# Patient Record
Sex: MALE | Race: BLACK OR AFRICAN AMERICAN | NOT HISPANIC OR LATINO | Employment: OTHER | URBAN - METROPOLITAN AREA
[De-identification: names, ages, dates, MRNs, and addresses within clinical notes are randomized per-mention and may not be internally consistent; named-entity substitution may affect disease eponyms.]

---

## 2023-06-29 LAB
ALBUMIN (G/DL) IN SER/PLAS: 4.1 G/DL (ref 3.4–5)
ANION GAP IN SER/PLAS: 11 MMOL/L (ref 10–20)
CALCIDIOL (25 OH VITAMIN D3) (NG/ML) IN SER/PLAS: 60 NG/ML
CALCIUM (MG/DL) IN SER/PLAS: 10.2 MG/DL (ref 8.6–10.6)
CARBON DIOXIDE, TOTAL (MMOL/L) IN SER/PLAS: 34 MMOL/L (ref 21–32)
CHLORIDE (MMOL/L) IN SER/PLAS: 103 MMOL/L (ref 98–107)
CREATININE (MG/DL) IN SER/PLAS: 0.81 MG/DL (ref 0.5–1.3)
GFR MALE: >90 ML/MIN/1.73M2
GLUCOSE (MG/DL) IN SER/PLAS: 122 MG/DL (ref 74–99)
PHOSPHATE (MG/DL) IN SER/PLAS: 3.2 MG/DL (ref 2.5–4.9)
POTASSIUM (MMOL/L) IN SER/PLAS: 4 MMOL/L (ref 3.5–5.3)
SODIUM (MMOL/L) IN SER/PLAS: 144 MMOL/L (ref 136–145)
UREA NITROGEN (MG/DL) IN SER/PLAS: 11 MG/DL (ref 6–23)

## 2023-08-18 LAB — CREATININE (MG/DL) IN URINE: 53.9 MG/DL (ref 20–370)

## 2023-08-31 LAB
ALANINE AMINOTRANSFERASE (SGPT) (U/L) IN SER/PLAS: 27 U/L (ref 10–52)
ALBUMIN (G/DL) IN SER/PLAS: 3.9 G/DL (ref 3.4–5)
ALKALINE PHOSPHATASE (U/L) IN SER/PLAS: 163 U/L (ref 33–120)
ANION GAP IN SER/PLAS: 13 MMOL/L (ref 10–20)
ASPARTATE AMINOTRANSFERASE (SGOT) (U/L) IN SER/PLAS: 21 U/L (ref 9–39)
BASOPHILS (10*3/UL) IN BLOOD BY AUTOMATED COUNT: 0.02 X10E9/L (ref 0–0.1)
BASOPHILS/100 LEUKOCYTES IN BLOOD BY AUTOMATED COUNT: 0.4 % (ref 0–2)
BILIRUBIN TOTAL (MG/DL) IN SER/PLAS: 0.3 MG/DL (ref 0–1.2)
CALCIUM (MG/DL) IN SER/PLAS: 10.2 MG/DL (ref 8.6–10.6)
CARBON DIOXIDE, TOTAL (MMOL/L) IN SER/PLAS: 30 MMOL/L (ref 21–32)
CHLORIDE (MMOL/L) IN SER/PLAS: 103 MMOL/L (ref 98–107)
CREATININE (MG/DL) IN SER/PLAS: 0.73 MG/DL (ref 0.5–1.3)
EOSINOPHILS (10*3/UL) IN BLOOD BY AUTOMATED COUNT: 0.58 X10E9/L (ref 0–0.7)
EOSINOPHILS/100 LEUKOCYTES IN BLOOD BY AUTOMATED COUNT: 12.2 % (ref 0–6)
ERYTHROCYTE DISTRIBUTION WIDTH (RATIO) BY AUTOMATED COUNT: 14.7 % (ref 11.5–14.5)
ERYTHROCYTE MEAN CORPUSCULAR HEMOGLOBIN CONCENTRATION (G/DL) BY AUTOMATED: 31.6 G/DL (ref 32–36)
ERYTHROCYTE MEAN CORPUSCULAR VOLUME (FL) BY AUTOMATED COUNT: 85 FL (ref 80–100)
ERYTHROCYTES (10*6/UL) IN BLOOD BY AUTOMATED COUNT: 4.63 X10E12/L (ref 4.5–5.9)
GFR MALE: >90 ML/MIN/1.73M2
GLUCOSE (MG/DL) IN SER/PLAS: 82 MG/DL (ref 74–99)
HEMATOCRIT (%) IN BLOOD BY AUTOMATED COUNT: 39.2 % (ref 41–52)
HEMOGLOBIN (G/DL) IN BLOOD: 12.4 G/DL (ref 13.5–17.5)
IMMATURE GRANULOCYTES/100 LEUKOCYTES IN BLOOD BY AUTOMATED COUNT: 0.2 % (ref 0–0.9)
LEUKOCYTES (10*3/UL) IN BLOOD BY AUTOMATED COUNT: 4.8 X10E9/L (ref 4.4–11.3)
LYMPHOCYTES (10*3/UL) IN BLOOD BY AUTOMATED COUNT: 1.98 X10E9/L (ref 1.2–4.8)
LYMPHOCYTES/100 LEUKOCYTES IN BLOOD BY AUTOMATED COUNT: 41.7 % (ref 13–44)
MONOCYTES (10*3/UL) IN BLOOD BY AUTOMATED COUNT: 0.42 X10E9/L (ref 0.1–1)
MONOCYTES/100 LEUKOCYTES IN BLOOD BY AUTOMATED COUNT: 8.8 % (ref 2–10)
NEUTROPHILS (10*3/UL) IN BLOOD BY AUTOMATED COUNT: 1.74 X10E9/L (ref 1.2–7.7)
NEUTROPHILS/100 LEUKOCYTES IN BLOOD BY AUTOMATED COUNT: 36.7 % (ref 40–80)
NRBC (PER 100 WBCS) BY AUTOMATED COUNT: 0 /100 WBC (ref 0–0)
PLATELETS (10*3/UL) IN BLOOD AUTOMATED COUNT: 423 X10E9/L (ref 150–450)
POTASSIUM (MMOL/L) IN SER/PLAS: 4.2 MMOL/L (ref 3.5–5.3)
PROTEIN TOTAL: 7.1 G/DL (ref 6.4–8.2)
SODIUM (MMOL/L) IN SER/PLAS: 142 MMOL/L (ref 136–145)
UREA NITROGEN (MG/DL) IN SER/PLAS: 12 MG/DL (ref 6–23)

## 2023-09-02 LAB
NIL(NEG) CONTROL SPOT COUNT: NORMAL
PANEL A SPOT COUNT: 1
PANEL B SPOT COUNT: 1
POS CONTROL SPOT COUNT: NORMAL
T-SPOT. TB INTERPRETATION: NEGATIVE

## 2023-09-26 LAB
ALANINE AMINOTRANSFERASE (SGPT) (U/L) IN SER/PLAS: 23 U/L (ref 10–52)
ALBUMIN (G/DL) IN SER/PLAS: 4.3 G/DL (ref 3.4–5)
ALKALINE PHOSPHATASE (U/L) IN SER/PLAS: 160 U/L (ref 33–120)
ANION GAP IN SER/PLAS: 10 MMOL/L (ref 10–20)
ASPARTATE AMINOTRANSFERASE (SGOT) (U/L) IN SER/PLAS: 21 U/L (ref 9–39)
BASOPHILS (10*3/UL) IN BLOOD BY AUTOMATED COUNT: 0.03 X10E9/L (ref 0–0.1)
BASOPHILS/100 LEUKOCYTES IN BLOOD BY AUTOMATED COUNT: 0.6 % (ref 0–2)
BILIRUBIN TOTAL (MG/DL) IN SER/PLAS: 0.4 MG/DL (ref 0–1.2)
CALCIDIOL (25 OH VITAMIN D3) (NG/ML) IN SER/PLAS: 54 NG/ML
CALCIUM (MG/DL) IN SER/PLAS: 10.5 MG/DL (ref 8.6–10.6)
CARBON DIOXIDE, TOTAL (MMOL/L) IN SER/PLAS: 32 MMOL/L (ref 21–32)
CHLORIDE (MMOL/L) IN SER/PLAS: 103 MMOL/L (ref 98–107)
CHOLESTEROL (MG/DL) IN SER/PLAS: 169 MG/DL (ref 0–199)
CHOLESTEROL IN HDL (MG/DL) IN SER/PLAS: 52.7 MG/DL
CHOLESTEROL/HDL RATIO: 3.2
CREATININE (MG/DL) IN SER/PLAS: 0.83 MG/DL (ref 0.5–1.3)
EOSINOPHILS (10*3/UL) IN BLOOD BY AUTOMATED COUNT: 0.44 X10E9/L (ref 0–0.7)
EOSINOPHILS/100 LEUKOCYTES IN BLOOD BY AUTOMATED COUNT: 8.9 % (ref 0–6)
ERYTHROCYTE DISTRIBUTION WIDTH (RATIO) BY AUTOMATED COUNT: 15.2 % (ref 11.5–14.5)
ERYTHROCYTE MEAN CORPUSCULAR HEMOGLOBIN CONCENTRATION (G/DL) BY AUTOMATED: 30.7 G/DL (ref 32–36)
ERYTHROCYTE MEAN CORPUSCULAR VOLUME (FL) BY AUTOMATED COUNT: 87 FL (ref 80–100)
ERYTHROCYTES (10*6/UL) IN BLOOD BY AUTOMATED COUNT: 5.09 X10E12/L (ref 4.5–5.9)
ESTIMATED AVERAGE GLUCOSE FOR HBA1C: 103 MG/DL
GFR MALE: >90 ML/MIN/1.73M2
GLUCOSE (MG/DL) IN SER/PLAS: 127 MG/DL (ref 74–99)
HEMATOCRIT (%) IN BLOOD BY AUTOMATED COUNT: 44.3 % (ref 41–52)
HEMOGLOBIN (G/DL) IN BLOOD: 13.6 G/DL (ref 13.5–17.5)
HEMOGLOBIN A1C/HEMOGLOBIN TOTAL IN BLOOD: 5.2 %
IMMATURE GRANULOCYTES/100 LEUKOCYTES IN BLOOD BY AUTOMATED COUNT: 0.2 % (ref 0–0.9)
KEPPRA: 18 UG/ML (ref 10–40)
LDL: 91 MG/DL (ref 0–99)
LEUKOCYTES (10*3/UL) IN BLOOD BY AUTOMATED COUNT: 5 X10E9/L (ref 4.4–11.3)
LYMPHOCYTES (10*3/UL) IN BLOOD BY AUTOMATED COUNT: 1.91 X10E9/L (ref 1.2–4.8)
LYMPHOCYTES/100 LEUKOCYTES IN BLOOD BY AUTOMATED COUNT: 38.6 % (ref 13–44)
MONOCYTES (10*3/UL) IN BLOOD BY AUTOMATED COUNT: 0.32 X10E9/L (ref 0.1–1)
MONOCYTES/100 LEUKOCYTES IN BLOOD BY AUTOMATED COUNT: 6.5 % (ref 2–10)
NEUTROPHILS (10*3/UL) IN BLOOD BY AUTOMATED COUNT: 2.24 X10E9/L (ref 1.2–7.7)
NEUTROPHILS/100 LEUKOCYTES IN BLOOD BY AUTOMATED COUNT: 45.2 % (ref 40–80)
NRBC (PER 100 WBCS) BY AUTOMATED COUNT: 0 /100 WBC (ref 0–0)
PLATELETS (10*3/UL) IN BLOOD AUTOMATED COUNT: 355 X10E9/L (ref 150–450)
POTASSIUM (MMOL/L) IN SER/PLAS: 3.8 MMOL/L (ref 3.5–5.3)
PROTEIN TOTAL: 7.7 G/DL (ref 6.4–8.2)
SODIUM (MMOL/L) IN SER/PLAS: 141 MMOL/L (ref 136–145)
THYROTROPIN (MIU/L) IN SER/PLAS BY DETECTION LIMIT <= 0.05 MIU/L: 2.24 MIU/L (ref 0.44–3.98)
THYROXINE (T4) FREE (NG/DL) IN SER/PLAS: 0.93 NG/DL (ref 0.78–1.48)
TRIGLYCERIDE (MG/DL) IN SER/PLAS: 127 MG/DL (ref 0–149)
URATE (MG/DL) IN SER/PLAS: 5.2 MG/DL (ref 4–7.5)
UREA NITROGEN (MG/DL) IN SER/PLAS: 12 MG/DL (ref 6–23)
VLDL: 25 MG/DL (ref 0–40)

## 2023-09-29 LAB — OXCARB OR ESLICARB METABOLITE (MHD): 19 UG/ML (ref 3–35)

## 2023-10-14 ENCOUNTER — HOSPITAL ENCOUNTER (EMERGENCY)
Facility: HOSPITAL | Age: 58
Discharge: HOME | End: 2023-10-14
Attending: EMERGENCY MEDICINE
Payer: MEDICAID

## 2023-10-14 ENCOUNTER — APPOINTMENT (OUTPATIENT)
Dept: RADIOLOGY | Facility: HOSPITAL | Age: 58
End: 2023-10-14
Payer: MEDICAID

## 2023-10-14 VITALS
TEMPERATURE: 98.4 F | SYSTOLIC BLOOD PRESSURE: 151 MMHG | RESPIRATION RATE: 16 BRPM | DIASTOLIC BLOOD PRESSURE: 101 MMHG | OXYGEN SATURATION: 95 % | HEART RATE: 89 BPM

## 2023-10-14 DIAGNOSIS — J06.9 UPPER RESPIRATORY TRACT INFECTION, UNSPECIFIED TYPE: Primary | ICD-10-CM

## 2023-10-14 LAB
ALBUMIN SERPL BCP-MCNC: 4.1 G/DL (ref 3.4–5)
ALP SERPL-CCNC: 194 U/L (ref 33–120)
ALT SERPL W P-5'-P-CCNC: 39 U/L (ref 10–52)
ANION GAP SERPL CALC-SCNC: 13 MMOL/L (ref 10–20)
AST SERPL W P-5'-P-CCNC: 55 U/L (ref 9–39)
BASOPHILS # BLD AUTO: 0.02 X10*3/UL (ref 0–0.1)
BASOPHILS NFR BLD AUTO: 0.2 %
BILIRUB SERPL-MCNC: 0.4 MG/DL (ref 0–1.2)
BNP SERPL-MCNC: 22 PG/ML (ref 0–99)
BUN SERPL-MCNC: 7 MG/DL (ref 6–23)
CALCIUM SERPL-MCNC: 9.6 MG/DL (ref 8.6–10.3)
CARDIAC TROPONIN I PNL SERPL HS: 8 NG/L (ref 0–20)
CARDIAC TROPONIN I PNL SERPL HS: 9 NG/L (ref 0–20)
CHLORIDE SERPL-SCNC: 98 MMOL/L (ref 98–107)
CO2 SERPL-SCNC: 28 MMOL/L (ref 21–32)
CREAT SERPL-MCNC: 0.62 MG/DL (ref 0.5–1.3)
D DIMER PPP FEU-MCNC: 725 NG/ML FEU
EOSINOPHIL # BLD AUTO: 0.62 X10*3/UL (ref 0–0.7)
EOSINOPHIL NFR BLD AUTO: 4.8 %
ERYTHROCYTE [DISTWIDTH] IN BLOOD BY AUTOMATED COUNT: 14.6 % (ref 11.5–14.5)
GFR SERPL CREATININE-BSD FRML MDRD: >90 ML/MIN/1.73M*2
GLUCOSE SERPL-MCNC: 101 MG/DL (ref 74–99)
HCT VFR BLD AUTO: 41.9 % (ref 41–52)
HGB BLD-MCNC: 13.6 G/DL (ref 13.5–17.5)
IMM GRANULOCYTES # BLD AUTO: 0.05 X10*3/UL (ref 0–0.7)
IMM GRANULOCYTES NFR BLD AUTO: 0.4 % (ref 0–0.9)
INR PPP: 1.1 (ref 0.9–1.1)
LYMPHOCYTES # BLD AUTO: 1.47 X10*3/UL (ref 1.2–4.8)
LYMPHOCYTES NFR BLD AUTO: 11.4 %
MAGNESIUM SERPL-MCNC: 2.03 MG/DL (ref 1.6–2.4)
MCH RBC QN AUTO: 26.8 PG (ref 26–34)
MCHC RBC AUTO-ENTMCNC: 32.5 G/DL (ref 32–36)
MCV RBC AUTO: 83 FL (ref 80–100)
MONOCYTES # BLD AUTO: 1.04 X10*3/UL (ref 0.1–1)
MONOCYTES NFR BLD AUTO: 8.1 %
NEUTROPHILS # BLD AUTO: 9.67 X10*3/UL (ref 1.2–7.7)
NEUTROPHILS NFR BLD AUTO: 75.1 %
NRBC BLD-RTO: 0 /100 WBCS (ref 0–0)
PLATELET # BLD AUTO: 325 X10*3/UL (ref 150–450)
PMV BLD AUTO: 10 FL (ref 7.5–11.5)
POTASSIUM SERPL-SCNC: 3.7 MMOL/L (ref 3.5–5.3)
PROT SERPL-MCNC: 8 G/DL (ref 6.4–8.2)
PROTHROMBIN TIME: 12.1 SECONDS (ref 9.8–12.8)
RBC # BLD AUTO: 5.08 X10*6/UL (ref 4.5–5.9)
SODIUM SERPL-SCNC: 135 MMOL/L (ref 136–145)
WBC # BLD AUTO: 12.9 X10*3/UL (ref 4.4–11.3)

## 2023-10-14 PROCEDURE — 71275 CT ANGIOGRAPHY CHEST: CPT | Performed by: RADIOLOGY

## 2023-10-14 PROCEDURE — 83735 ASSAY OF MAGNESIUM: CPT

## 2023-10-14 PROCEDURE — 2580000001 HC RX 258 IV SOLUTIONS

## 2023-10-14 PROCEDURE — 99285 EMERGENCY DEPT VISIT HI MDM: CPT | Performed by: EMERGENCY MEDICINE

## 2023-10-14 PROCEDURE — 85379 FIBRIN DEGRADATION QUANT: CPT

## 2023-10-14 PROCEDURE — 2500000004 HC RX 250 GENERAL PHARMACY W/ HCPCS (ALT 636 FOR OP/ED)

## 2023-10-14 PROCEDURE — 84484 ASSAY OF TROPONIN QUANT: CPT

## 2023-10-14 PROCEDURE — 71045 X-RAY EXAM CHEST 1 VIEW: CPT | Mod: FY

## 2023-10-14 PROCEDURE — 36415 COLL VENOUS BLD VENIPUNCTURE: CPT

## 2023-10-14 PROCEDURE — 71045 X-RAY EXAM CHEST 1 VIEW: CPT | Performed by: RADIOLOGY

## 2023-10-14 PROCEDURE — 2550000001 HC RX 255 CONTRASTS

## 2023-10-14 PROCEDURE — 93010 ELECTROCARDIOGRAM REPORT: CPT | Performed by: EMERGENCY MEDICINE

## 2023-10-14 PROCEDURE — 84484 ASSAY OF TROPONIN QUANT: CPT | Mod: 59

## 2023-10-14 PROCEDURE — 85025 COMPLETE CBC W/AUTO DIFF WBC: CPT

## 2023-10-14 PROCEDURE — 99284 EMERGENCY DEPT VISIT MOD MDM: CPT | Mod: 25 | Performed by: EMERGENCY MEDICINE

## 2023-10-14 PROCEDURE — 83880 ASSAY OF NATRIURETIC PEPTIDE: CPT

## 2023-10-14 PROCEDURE — 85610 PROTHROMBIN TIME: CPT

## 2023-10-14 PROCEDURE — 71275 CT ANGIOGRAPHY CHEST: CPT

## 2023-10-14 PROCEDURE — 2500000001 HC RX 250 WO HCPCS SELF ADMINISTERED DRUGS (ALT 637 FOR MEDICARE OP)

## 2023-10-14 PROCEDURE — 80053 COMPREHEN METABOLIC PANEL: CPT

## 2023-10-14 RX ORDER — VERAPAMIL HYDROCHLORIDE 120 MG/1
240 TABLET, FILM COATED ORAL ONCE
Status: COMPLETED | OUTPATIENT
Start: 2023-10-14 | End: 2023-10-14

## 2023-10-14 RX ORDER — LISINOPRIL 10 MG/1
20 TABLET ORAL ONCE
Status: COMPLETED | OUTPATIENT
Start: 2023-10-14 | End: 2023-10-14

## 2023-10-14 RX ORDER — LEVETIRACETAM 500 MG/1
500 TABLET ORAL ONCE
Status: COMPLETED | OUTPATIENT
Start: 2023-10-14 | End: 2023-10-14

## 2023-10-14 RX ADMIN — SODIUM CHLORIDE, POTASSIUM CHLORIDE, SODIUM LACTATE AND CALCIUM CHLORIDE 1000 ML: 600; 310; 30; 20 INJECTION, SOLUTION INTRAVENOUS at 17:22

## 2023-10-14 RX ADMIN — VERAPAMIL HYDROCHLORIDE 240 MG: 120 TABLET ORAL at 19:55

## 2023-10-14 RX ADMIN — LEVETIRACETAM 500 MG: 500 TABLET, FILM COATED ORAL at 19:55

## 2023-10-14 RX ADMIN — LISINOPRIL 20 MG: 10 TABLET ORAL at 19:54

## 2023-10-14 RX ADMIN — IOHEXOL 60 ML: 350 INJECTION, SOLUTION INTRAVENOUS at 18:46

## 2023-10-14 ASSESSMENT — LIFESTYLE VARIABLES
HAVE PEOPLE ANNOYED YOU BY CRITICIZING YOUR DRINKING: NO
EVER HAD A DRINK FIRST THING IN THE MORNING TO STEADY YOUR NERVES TO GET RID OF A HANGOVER: NO
REASON UNABLE TO ASSESS: YES
HAVE YOU EVER FELT YOU SHOULD CUT DOWN ON YOUR DRINKING: NO
EVER FELT BAD OR GUILTY ABOUT YOUR DRINKING: NO

## 2023-10-14 ASSESSMENT — COLUMBIA-SUICIDE SEVERITY RATING SCALE - C-SSRS
6. HAVE YOU EVER DONE ANYTHING, STARTED TO DO ANYTHING, OR PREPARED TO DO ANYTHING TO END YOUR LIFE?: NO
2. HAVE YOU ACTUALLY HAD ANY THOUGHTS OF KILLING YOURSELF?: NO
1. IN THE PAST MONTH, HAVE YOU WISHED YOU WERE DEAD OR WISHED YOU COULD GO TO SLEEP AND NOT WAKE UP?: NO

## 2023-10-14 NOTE — ED PROVIDER NOTES
HPI   Chief Complaint   Patient presents with    low SPO2 per staff     Staff reported 88% spo2, jasmin states pt 95% for them       Patient is a 58-year-old male cerebral palsy, nonverbal, hypertension, epilepsy, hyperlipidemia who presents emergency department for decreased oxygen saturation.  Patient resides in a group home.  He has been having coughing and nasal congestion over the last several days.  He took his pulse ox and reported that it was 88% on room air.  They called EMS.  EMS took his vital signs and he was at 95%.  Patient was brought to the emerged part for evaluation.  Patient is nonverbal and and review of systems is unable to obtain, but his caregiver is here with him and the above history is obtained.      History provided by:  Caregiver and EMS personnel                      No data recorded                Patient History   Past Medical History:   Diagnosis Date    Epilepsy, unspecified, not intractable, without status epilepticus (CMS/[facility])     Epilepsy    Hyperlipidemia, unspecified     Dyslipidemia    Personal history of other diseases of the circulatory system     History of hypertension    Personal history of other diseases of the nervous system and sense organs     History of cerebral palsy    Personal history of other endocrine, nutritional and metabolic disease     History of vitamin D deficiency    Personal history of other specified conditions     History of dysphagia     Past Surgical History:   Procedure Laterality Date    OTHER SURGICAL HISTORY  11/26/2019    No history of surgery     No family history on file.  Social History     Tobacco Use    Smoking status: Not on file    Smokeless tobacco: Not on file   Substance Use Topics    Alcohol use: Not on file    Drug use: Not on file       Physical Exam   ED Triage Vitals [10/14/23 1456]   Temp Heart Rate Resp BP   36.9 °C (98.4 °F) 104 18 (!) 199/97      SpO2 Temp src Heart Rate Source Patient Position   95 % -- Monitor --      BP  Location FiO2 (%)     -- --       Physical Exam  Vitals and nursing note reviewed.   Constitutional:       General: He is not in acute distress.     Appearance: He is well-developed. He is not ill-appearing or toxic-appearing.      Comments: Awake and alert, nonverbal.   HENT:      Head: Normocephalic and atraumatic.      Right Ear: External ear normal.      Left Ear: External ear normal.      Nose: Nose normal.      Mouth/Throat:      Mouth: Mucous membranes are moist.   Eyes:      General: No scleral icterus.     Conjunctiva/sclera: Conjunctivae normal.      Pupils: Pupils are equal, round, and reactive to light.   Cardiovascular:      Rate and Rhythm: Normal rate and regular rhythm.      Heart sounds: No murmur heard.  Pulmonary:      Effort: Pulmonary effort is normal. No respiratory distress.      Breath sounds: Normal breath sounds.   Abdominal:      Palpations: Abdomen is soft.      Tenderness: There is no abdominal tenderness.   Musculoskeletal:         General: No swelling or tenderness.      Cervical back: Neck supple. No rigidity.      Comments: Bilateral upper extremities are contracted in a flexed position over his chest.   Skin:     General: Skin is warm and dry.   Neurological:      General: No focal deficit present.      Mental Status: He is alert. Mental status is at baseline.   Psychiatric:         Mood and Affect: Mood normal.         ED Course & MDM   Diagnoses as of 10/14/23 1936   Upper respiratory tract infection, unspecified type       Medical Decision Making  Patient is a 58-year-old male with cerebral palsy, nonverbal who presents to the emergency department for hypoxia at his nursing facility.  Patient did not have any hypoxia at the nursing facility or here.  He appears comfortable without any distress on arrival.  Afebrile, hemodynamically stable.  We will obtain lab work and chest x-ray to evaluate for any potential hypoxia such as cardiac ischemia, pneumonia, pneumothorax, pleural  effusions, heart failure.  He is no be tachycardic here.  D-dimer will be obtained to rule out pulmonary embolism.    EKG at [] as interpreted by myself and attending physician: Ventricular rate []    Results for orders placed or performed during the hospital encounter of 10/14/23  -CBC and Auto Differential:        Result                      Value             Ref Range           WBC                         12.9 (H)          4.4 - 11.3 x*       nRBC                        0.0               0.0 - 0.0 /1*       RBC                         5.08              4.50 - 5.90 *       Hemoglobin                  13.6              13.5 - 17.5 *       Hematocrit                  41.9              41.0 - 52.0 %       MCV                         83                80 - 100 fL         MCH                         26.8              26.0 - 34.0 *       MCHC                        32.5              32.0 - 36.0 *       RDW                         14.6 (H)          11.5 - 14.5 %       Platelets                   325               150 - 450 x1*       MPV                         10.0              7.5 - 11.5 fL       Neutrophils %               75.1              40.0 - 80.0 %       Immature Granulocytes *     0.4               0.0 - 0.9 %         Lymphocytes %               11.4              13.0 - 44.0 %       Monocytes %                 8.1               2.0 - 10.0 %        Eosinophils %               4.8               0.0 - 6.0 %         Basophils %                 0.2               0.0 - 2.0 %         Neutrophils Absolute        9.67 (H)          1.20 - 7.70 *       Immature Granulocytes *     0.05              0.00 - 0.70 *       Lymphocytes Absolute        1.47              1.20 - 4.80 *       Monocytes Absolute          1.04 (H)          0.10 - 1.00 *       Eosinophils Absolute        0.62              0.00 - 0.70 *       Basophils Absolute          0.02              0.00 - 0.10 *  -Comprehensive Metabolic Panel:        Result                       Value             Ref Range           Glucose                     101 (H)           74 - 99 mg/dL       Sodium                      135 (L)           136 - 145 mm*       Potassium                   3.7               3.5 - 5.3 mm*       Chloride                    98                98 - 107 mmo*       Bicarbonate                 28                21 - 32 mmol*       Anion Gap                   13                10 - 20 mmol*       Urea Nitrogen               7                 6 - 23 mg/dL        Creatinine                  0.62              0.50 - 1.30 *       eGFR                        >90               >60 mL/min/1*       Calcium                     9.6               8.6 - 10.3 m*       Albumin                     4.1               3.4 - 5.0 g/*       Alkaline Phosphatase        194 (H)           33 - 120 U/L        Total Protein               8.0               6.4 - 8.2 g/*       AST                         55 (H)            9 - 39 U/L          Bilirubin, Total            0.4               0.0 - 1.2 mg*       ALT                         39                10 - 52 U/L    -Magnesium:        Result                      Value             Ref Range           Magnesium                   2.03              1.60 - 2.40 *  -Protime-INR:        Result                      Value             Ref Range           Protime                     12.1              9.8 - 12.8 s*       INR                         1.1               0.9 - 1.1      -B-type natriuretic peptide:        Result                      Value             Ref Range           BNP                         22                0 - 99 pg/mL   -Troponin I, High Sensitivity, Initial:        Result                      Value             Ref Range           Troponin I, High Sensi*     8                 0 - 20 ng/L    -Troponin, High Sensitivity, 1 Hour:        Result                      Value             Ref Range           Troponin I, High Sensi*     9                  0 - 20 ng/L    -D-dimer, quantitative:        Result                      Value             Ref Range           D-Dimer Non VTE, Quant*     725 (H)           <=500 ng/mL *     XR chest 1 view   Final Result    Limited exam shows no acute cardiopulmonary process          MACRO:    None          Signed by: Rios Trujillo 10/14/2023 4:36 PM    Dictation workstation:   [id number]       D-dimer is elevated at 725.  We will obtain CT angio of the chest to rule out pulmonary embolism.    CT angio chest for pulmonary embolism   Final Result    No acute pulmonary embolism to the segmental level within constraints    of artifact.          No focal consolidation or sizable pleural effusion.          Partially imaged significant stool burden.                MACRO:    None.          Signed by: Loree Dobbs 10/14/2023 7:14 PM    Dictation workstation:   [id number]     CT angio of the chest is negative for pulm embolism or any other acute radiographic findings.  Findings were discussed with the patient's caregiver.  Patient likely has a viral URI.  There is significant stool noted on the CT chest incidentally.  On chart review, he has as needed MiraLAX.  His discharge paperwork as well as verbal instructions are to start using the MiraLAX in addition to his lactulose.  Home care and return instructions discussed.  Patient's caregiver expressed understanding and agreement. Patient discharged in stable condition.    Tommy Nguyễn DO, PGY-3  Emergency Medicine Resident    Amount and/or Complexity of Data Reviewed  Independent Historian: caregiver and EMS  Labs: ordered.  Radiology: ordered and independent interpretation performed.  ECG/medicine tests: ordered and independent interpretation performed.        Procedure  Procedures     Tommy Nguyễn DO  Resident  10/14/23 7898

## 2023-10-17 ENCOUNTER — SPECIALTY PHARMACY (OUTPATIENT)
Dept: PHARMACY | Facility: CLINIC | Age: 58
End: 2023-10-17

## 2023-10-17 ENCOUNTER — PHARMACY VISIT (OUTPATIENT)
Dept: PHARMACY | Facility: CLINIC | Age: 58
End: 2023-10-17
Payer: MEDICAID

## 2023-10-17 ENCOUNTER — HOSPITAL ENCOUNTER (OUTPATIENT)
Dept: CARDIOLOGY | Facility: HOSPITAL | Age: 58
Discharge: HOME | End: 2023-10-17
Payer: MEDICAID

## 2023-10-17 LAB
ATRIAL RATE: 101 BPM
P AXIS: 61 DEGREES
P OFFSET: 185 MS
P ONSET: 139 MS
PR INTERVAL: 166 MS
Q ONSET: 222 MS
QRS COUNT: 17 BEATS
QRS DURATION: 60 MS
QT INTERVAL: 312 MS
QTC CALCULATION(BAZETT): 404 MS
QTC FREDERICIA: 371 MS
R AXIS: 41 DEGREES
T AXIS: 9 DEGREES
T OFFSET: 378 MS
VENTRICULAR RATE: 101 BPM

## 2023-10-17 PROCEDURE — 93005 ELECTROCARDIOGRAM TRACING: CPT

## 2023-10-17 PROCEDURE — RXMED WILLOW AMBULATORY MEDICATION CHARGE

## 2023-11-11 ENCOUNTER — PHARMACY VISIT (OUTPATIENT)
Dept: PHARMACY | Facility: CLINIC | Age: 58
End: 2023-11-11
Payer: MEDICAID

## 2023-11-11 PROCEDURE — RXMED WILLOW AMBULATORY MEDICATION CHARGE

## 2023-11-15 ENCOUNTER — SPECIALTY PHARMACY (OUTPATIENT)
Dept: PHARMACY | Facility: CLINIC | Age: 58
End: 2023-11-15

## 2023-12-15 ENCOUNTER — SPECIALTY PHARMACY (OUTPATIENT)
Dept: PHARMACY | Facility: CLINIC | Age: 58
End: 2023-12-15

## 2023-12-16 ENCOUNTER — SPECIALTY PHARMACY (OUTPATIENT)
Dept: PHARMACY | Facility: CLINIC | Age: 58
End: 2023-12-16

## 2023-12-16 PROCEDURE — RXMED WILLOW AMBULATORY MEDICATION CHARGE

## 2023-12-18 ENCOUNTER — PHARMACY VISIT (OUTPATIENT)
Dept: PHARMACY | Facility: CLINIC | Age: 58
End: 2023-12-18
Payer: MEDICAID

## 2024-01-12 PROCEDURE — RXMED WILLOW AMBULATORY MEDICATION CHARGE

## 2024-01-23 ENCOUNTER — SPECIALTY PHARMACY (OUTPATIENT)
Dept: PHARMACY | Facility: CLINIC | Age: 59
End: 2024-01-23

## 2024-01-23 ENCOUNTER — PHARMACY VISIT (OUTPATIENT)
Dept: PHARMACY | Facility: CLINIC | Age: 59
End: 2024-01-23
Payer: MEDICAID

## 2024-02-17 PROCEDURE — RXMED WILLOW AMBULATORY MEDICATION CHARGE

## 2024-02-19 ENCOUNTER — PHARMACY VISIT (OUTPATIENT)
Dept: PHARMACY | Facility: CLINIC | Age: 59
End: 2024-02-19
Payer: MEDICAID

## 2024-02-19 ENCOUNTER — SPECIALTY PHARMACY (OUTPATIENT)
Dept: PHARMACY | Facility: CLINIC | Age: 59
End: 2024-02-19

## 2024-02-23 ENCOUNTER — OFFICE VISIT (OUTPATIENT)
Dept: DERMATOLOGY | Facility: CLINIC | Age: 59
End: 2024-02-23
Payer: MEDICAID

## 2024-02-23 DIAGNOSIS — L20.89 OTHER ATOPIC DERMATITIS: Primary | ICD-10-CM

## 2024-02-23 PROCEDURE — 99213 OFFICE O/P EST LOW 20 MIN: CPT | Performed by: DERMATOLOGY

## 2024-02-23 RX ORDER — NAPROXEN SODIUM 220 MG/1
81 TABLET, FILM COATED ORAL
COMMUNITY

## 2024-02-23 RX ORDER — POTASSIUM CHLORIDE 750 MG/1
CAPSULE, EXTENDED RELEASE ORAL
COMMUNITY

## 2024-02-23 RX ORDER — CHOLECALCIFEROL (VITAMIN D3) 25 MCG
1 TABLET ORAL DAILY
COMMUNITY

## 2024-02-23 RX ORDER — OXCARBAZEPINE 300 MG/1
1 TABLET, FILM COATED ORAL 2 TIMES DAILY
COMMUNITY
Start: 2016-05-31

## 2024-02-23 RX ORDER — DOCUSATE SODIUM 50MG AND SENNOSIDES 8.6MG 8.6; 5 MG/1; MG/1
TABLET, FILM COATED ORAL
COMMUNITY
Start: 2024-02-14

## 2024-02-23 RX ORDER — LISINOPRIL 20 MG/1
20 TABLET ORAL
COMMUNITY
Start: 2022-08-15

## 2024-02-23 RX ORDER — LABETALOL 100 MG/1
TABLET, FILM COATED ORAL
COMMUNITY
Start: 2024-02-14

## 2024-02-23 RX ORDER — MULTIPLE VITAMINS W/ MINERALS TAB 9MG-400MCG
TAB ORAL
COMMUNITY
Start: 2024-01-15

## 2024-02-23 RX ORDER — POLYETHYLENE GLYCOL 3350 17 G/17G
17 POWDER, FOR SOLUTION ORAL
COMMUNITY
Start: 2015-06-08

## 2024-02-23 RX ORDER — LEVETIRACETAM 500 MG/1
500 TABLET ORAL 2 TIMES DAILY
COMMUNITY
Start: 2016-05-31

## 2024-02-23 RX ORDER — CLONIDINE HYDROCHLORIDE 0.2 MG/1
TABLET ORAL
COMMUNITY
Start: 2024-02-14

## 2024-02-23 RX ORDER — OXCARBAZEPINE 150 MG/1
1 TABLET, FILM COATED ORAL 2 TIMES DAILY
COMMUNITY
Start: 2016-05-31

## 2024-02-23 RX ORDER — CHLORHEXIDINE GLUCONATE ORAL RINSE 1.2 MG/ML
15 SOLUTION DENTAL 2 TIMES DAILY
COMMUNITY
Start: 2015-07-30

## 2024-02-23 RX ORDER — HYDROCHLOROTHIAZIDE 12.5 MG/1
TABLET ORAL
COMMUNITY
Start: 2024-02-14

## 2024-02-23 RX ORDER — AMLODIPINE BESYLATE 10 MG/1
10 TABLET ORAL 2 TIMES DAILY
COMMUNITY

## 2024-02-23 NOTE — PROGRESS NOTES
Subjective     Maximo Pablo is a 58 y.o. male who presents for the following: Eczema (Pt presents for 6 month follow up of Eczema and Dupixent. No complaints at this time. ).     Review of Systems:  No other skin or systemic complaints other than what is documented elsewhere in the note.    The following portions of the chart were reviewed this encounter and updated as appropriate:          Skin Cancer History  No skin cancer on file.      Specialty Problems    None       Objective   Well appearing patient in no apparent distress; mood and affect are within normal limits.    A focused skin examination was performed of the neck, face, hands. Limited due to patient's contractures and wheel chair. All findings within normal limits unless otherwise noted below.    Assessment/Plan   1. Other atopic dermatitis  Few excoriated papules on the upper back, neck  BSA < 3%  SCORAD approximately 10    The chronic and intermittently flaring nature of this skin condition was discussed with the patient/cargiver today.     Patient previously on topical triamcinolone ointment with improvement, however continued to flare a few months later. He has been on Dupixent for over 1 year and is doing extremely well on treatment.    Caregiver does not report side effects on treatment.    Last labs: 8/2023 - CBC and CMP and Tspot WNL;   10/14/23 - CBC with diff and CMP - solitary liver enzyme and alk phos slightly elevated, WBC slightly elevated, otherwise stable    Continue Dupixent 300mg every other week subcutaneously  Continue triamcinolone 0.1% topical ointment as needed for flaring. apply 2x daily x 2 wks then decrease to 2x/day 2 days per week. Can repeat full 2 week course as often as every 6-8 weeks as needed for flaring.     Dupixent is a medication indicated for moderate to severe atopic dermatitis that does not respond to routine topical therapy. Side effects of medication including allergic reactions (hives, swelling, itching,  rash), injection site reaction (swelling at site where medication injected with pen or syringe), increased risk of infection (specifically parasitic), cold sores, joint pain, sore throat, dry, red eye (pink eye like symptoms).      Side effects of topical steroids includes, but is not limited to skin atrophy and dyspigmentation.

## 2024-03-04 ENCOUNTER — SPECIALTY PHARMACY (OUTPATIENT)
Dept: PHARMACY | Facility: CLINIC | Age: 59
End: 2024-03-04

## 2024-03-20 ENCOUNTER — SPECIALTY PHARMACY (OUTPATIENT)
Dept: PHARMACY | Facility: CLINIC | Age: 59
End: 2024-03-20

## 2024-03-20 ENCOUNTER — TELEPHONE (OUTPATIENT)
Dept: DERMATOLOGY | Facility: CLINIC | Age: 59
End: 2024-03-20
Payer: MEDICAID

## 2024-03-20 DIAGNOSIS — L20.89 OTHER ATOPIC DERMATITIS: Primary | ICD-10-CM

## 2024-03-20 NOTE — TELEPHONE ENCOUNTER
Pts caregiver from group home called r/t Dupixent refills. Pt is in need of refill dure for injection this week.

## 2024-03-21 ENCOUNTER — PHARMACY VISIT (OUTPATIENT)
Dept: PHARMACY | Facility: CLINIC | Age: 59
End: 2024-03-21
Payer: MEDICAID

## 2024-03-21 PROCEDURE — RXMED WILLOW AMBULATORY MEDICATION CHARGE

## 2024-03-29 ENCOUNTER — LAB REQUISITION (OUTPATIENT)
Dept: LAB | Facility: HOSPITAL | Age: 59
End: 2024-03-29
Payer: MEDICAID

## 2024-03-29 DIAGNOSIS — E87.0 HYPEROSMOLALITY AND HYPERNATREMIA: ICD-10-CM

## 2024-03-29 DIAGNOSIS — N18.2 CHRONIC KIDNEY DISEASE, STAGE 2 (MILD): ICD-10-CM

## 2024-03-29 DIAGNOSIS — G40.509 EPILEPTIC SEIZURES RELATED TO EXTERNAL CAUSES, NOT INTRACTABLE, WITHOUT STATUS EPILEPTICUS (MULTI): ICD-10-CM

## 2024-03-29 DIAGNOSIS — I10 ESSENTIAL (PRIMARY) HYPERTENSION: ICD-10-CM

## 2024-03-29 LAB
ANION GAP SERPL CALC-SCNC: 13 MMOL/L (ref 10–20)
BUN SERPL-MCNC: 8 MG/DL (ref 6–23)
CALCIUM SERPL-MCNC: 9.9 MG/DL (ref 8.6–10.3)
CHLORIDE SERPL-SCNC: 100 MMOL/L (ref 98–107)
CO2 SERPL-SCNC: 30 MMOL/L (ref 21–32)
CREAT SERPL-MCNC: 0.74 MG/DL (ref 0.5–1.3)
CREAT UR-MCNC: 71.2 MG/DL (ref 20–370)
EGFRCR SERPLBLD CKD-EPI 2021: >90 ML/MIN/1.73M*2
GLUCOSE SERPL-MCNC: 87 MG/DL (ref 74–99)
LEVETIRACETAM SERPL-MCNC: 8 UG/ML (ref 10–40)
POTASSIUM SERPL-SCNC: 4 MMOL/L (ref 3.5–5.3)
SODIUM SERPL-SCNC: 139 MMOL/L (ref 136–145)
T4 FREE SERPL-MCNC: 0.54 NG/DL (ref 0.61–1.12)
TSH SERPL-ACNC: 1.65 MIU/L (ref 0.44–3.98)
URATE SERPL-MCNC: 4.2 MG/DL (ref 4–7.5)

## 2024-03-29 PROCEDURE — 82570 ASSAY OF URINE CREATININE: CPT | Mod: OUT | Performed by: INTERNAL MEDICINE

## 2024-03-29 PROCEDURE — 80048 BASIC METABOLIC PNL TOTAL CA: CPT | Mod: OUT | Performed by: INTERNAL MEDICINE

## 2024-03-29 PROCEDURE — 84439 ASSAY OF FREE THYROXINE: CPT | Mod: OUT | Performed by: INTERNAL MEDICINE

## 2024-03-29 PROCEDURE — 80177 DRUG SCRN QUAN LEVETIRACETAM: CPT | Mod: OUT,PARLAB | Performed by: INTERNAL MEDICINE

## 2024-03-29 PROCEDURE — 80183 DRUG SCRN QUANT OXCARBAZEPIN: CPT | Mod: OUT | Performed by: INTERNAL MEDICINE

## 2024-03-29 PROCEDURE — 84443 ASSAY THYROID STIM HORMONE: CPT | Mod: OUT | Performed by: INTERNAL MEDICINE

## 2024-03-29 PROCEDURE — 84550 ASSAY OF BLOOD/URIC ACID: CPT | Mod: OUT | Performed by: INTERNAL MEDICINE

## 2024-04-01 LAB — 10OH-CARBAZEPINE SERPL-MCNC: 18 UG/ML (ref 3–35)

## 2024-04-04 ENCOUNTER — SPECIALTY PHARMACY (OUTPATIENT)
Dept: PHARMACY | Facility: CLINIC | Age: 59
End: 2024-04-04

## 2024-04-16 PROCEDURE — RXMED WILLOW AMBULATORY MEDICATION CHARGE

## 2024-04-17 ENCOUNTER — PHARMACY VISIT (OUTPATIENT)
Dept: PHARMACY | Facility: CLINIC | Age: 59
End: 2024-04-17
Payer: MEDICAID

## 2024-05-09 ENCOUNTER — SPECIALTY PHARMACY (OUTPATIENT)
Dept: PHARMACY | Facility: CLINIC | Age: 59
End: 2024-05-09

## 2024-05-09 PROCEDURE — RXMED WILLOW AMBULATORY MEDICATION CHARGE

## 2024-05-13 ENCOUNTER — PHARMACY VISIT (OUTPATIENT)
Dept: PHARMACY | Facility: CLINIC | Age: 59
End: 2024-05-13
Payer: MEDICAID

## 2024-05-30 ENCOUNTER — LAB REQUISITION (OUTPATIENT)
Dept: LAB | Facility: HOSPITAL | Age: 59
End: 2024-05-30
Payer: MEDICAID

## 2024-05-30 DIAGNOSIS — Z12.5 ENCOUNTER FOR SCREENING FOR MALIGNANT NEOPLASM OF PROSTATE: ICD-10-CM

## 2024-05-30 LAB — PSA SERPL-MCNC: 1.25 NG/ML

## 2024-05-30 PROCEDURE — 84153 ASSAY OF PSA TOTAL: CPT | Mod: OUT,PARLAB | Performed by: INTERNAL MEDICINE

## 2024-06-07 ENCOUNTER — SPECIALTY PHARMACY (OUTPATIENT)
Dept: PHARMACY | Facility: CLINIC | Age: 59
End: 2024-06-07

## 2024-06-07 PROCEDURE — RXMED WILLOW AMBULATORY MEDICATION CHARGE

## 2024-06-10 ENCOUNTER — PHARMACY VISIT (OUTPATIENT)
Dept: PHARMACY | Facility: CLINIC | Age: 59
End: 2024-06-10
Payer: MEDICAID

## 2024-07-09 ENCOUNTER — SPECIALTY PHARMACY (OUTPATIENT)
Dept: PHARMACY | Facility: CLINIC | Age: 59
End: 2024-07-09

## 2024-07-09 PROCEDURE — RXMED WILLOW AMBULATORY MEDICATION CHARGE

## 2024-07-10 ENCOUNTER — PHARMACY VISIT (OUTPATIENT)
Dept: PHARMACY | Facility: CLINIC | Age: 59
End: 2024-07-10
Payer: MEDICAID

## 2024-07-25 ENCOUNTER — LAB REQUISITION (OUTPATIENT)
Dept: LAB | Facility: HOSPITAL | Age: 59
End: 2024-07-25
Payer: MEDICAID

## 2024-07-25 DIAGNOSIS — G40.909 EPILEPSY, UNSPECIFIED, NOT INTRACTABLE, WITHOUT STATUS EPILEPTICUS (MULTI): ICD-10-CM

## 2024-07-25 DIAGNOSIS — Z79.899 OTHER LONG TERM (CURRENT) DRUG THERAPY: ICD-10-CM

## 2024-07-25 DIAGNOSIS — K59.00 CONSTIPATION, UNSPECIFIED: ICD-10-CM

## 2024-07-25 LAB
ALBUMIN SERPL BCP-MCNC: 4.2 G/DL (ref 3.4–5)
ALP SERPL-CCNC: 164 U/L (ref 33–120)
ALT SERPL W P-5'-P-CCNC: 32 U/L (ref 10–52)
ANION GAP SERPL CALC-SCNC: 11 MMOL/L (ref 10–20)
AST SERPL W P-5'-P-CCNC: 29 U/L (ref 9–39)
BILIRUB DIRECT SERPL-MCNC: 0 MG/DL (ref 0–0.3)
BILIRUB SERPL-MCNC: 0.3 MG/DL (ref 0–1.2)
BUN SERPL-MCNC: 9 MG/DL (ref 6–23)
CALCIUM SERPL-MCNC: 10 MG/DL (ref 8.6–10.3)
CHLORIDE SERPL-SCNC: 100 MMOL/L (ref 98–107)
CO2 SERPL-SCNC: 32 MMOL/L (ref 21–32)
CREAT SERPL-MCNC: 0.64 MG/DL (ref 0.5–1.3)
EGFRCR SERPLBLD CKD-EPI 2021: >90 ML/MIN/1.73M*2
ERYTHROCYTE [DISTWIDTH] IN BLOOD BY AUTOMATED COUNT: 13.7 % (ref 11.5–14.5)
GLUCOSE SERPL-MCNC: 116 MG/DL (ref 74–99)
HCT VFR BLD AUTO: 43.4 % (ref 41–52)
HGB BLD-MCNC: 14.4 G/DL (ref 13.5–17.5)
MCH RBC QN AUTO: 27.6 PG (ref 26–34)
MCHC RBC AUTO-ENTMCNC: 33.2 G/DL (ref 32–36)
MCV RBC AUTO: 83 FL (ref 80–100)
NRBC BLD-RTO: 0 /100 WBCS (ref 0–0)
PLATELET # BLD AUTO: 331 X10*3/UL (ref 150–450)
POTASSIUM SERPL-SCNC: 4 MMOL/L (ref 3.5–5.3)
PROT SERPL-MCNC: 7.3 G/DL (ref 6.4–8.2)
RBC # BLD AUTO: 5.22 X10*6/UL (ref 4.5–5.9)
SODIUM SERPL-SCNC: 139 MMOL/L (ref 136–145)
WBC # BLD AUTO: 4.8 X10*3/UL (ref 4.4–11.3)

## 2024-07-25 PROCEDURE — 82248 BILIRUBIN DIRECT: CPT | Mod: OUT | Performed by: INTERNAL MEDICINE

## 2024-07-25 PROCEDURE — 80048 BASIC METABOLIC PNL TOTAL CA: CPT | Mod: OUT | Performed by: INTERNAL MEDICINE

## 2024-07-25 PROCEDURE — 85027 COMPLETE CBC AUTOMATED: CPT | Mod: OUT | Performed by: INTERNAL MEDICINE

## 2024-08-02 ENCOUNTER — SPECIALTY PHARMACY (OUTPATIENT)
Dept: PHARMACY | Facility: CLINIC | Age: 59
End: 2024-08-02

## 2024-08-02 PROCEDURE — RXMED WILLOW AMBULATORY MEDICATION CHARGE

## 2024-08-06 ENCOUNTER — PHARMACY VISIT (OUTPATIENT)
Dept: PHARMACY | Facility: CLINIC | Age: 59
End: 2024-08-06
Payer: MEDICAID

## 2024-08-09 ENCOUNTER — APPOINTMENT (OUTPATIENT)
Dept: RADIOLOGY | Facility: HOSPITAL | Age: 59
End: 2024-08-09
Payer: MEDICAID

## 2024-08-09 ENCOUNTER — HOSPITAL ENCOUNTER (EMERGENCY)
Facility: HOSPITAL | Age: 59
Discharge: HOME | End: 2024-08-10
Attending: EMERGENCY MEDICINE
Payer: MEDICAID

## 2024-08-09 DIAGNOSIS — K59.00 CONSTIPATION, UNSPECIFIED CONSTIPATION TYPE: ICD-10-CM

## 2024-08-09 DIAGNOSIS — R53.83 OTHER FATIGUE: Primary | ICD-10-CM

## 2024-08-09 LAB
ALBUMIN SERPL BCP-MCNC: 3.8 G/DL (ref 3.4–5)
ALP SERPL-CCNC: 215 U/L (ref 33–120)
ALT SERPL W P-5'-P-CCNC: 48 U/L (ref 10–52)
ANION GAP SERPL CALC-SCNC: 11 MMOL/L (ref 10–20)
AST SERPL W P-5'-P-CCNC: 48 U/L (ref 9–39)
BASOPHILS # BLD AUTO: 0.01 X10*3/UL (ref 0–0.1)
BASOPHILS NFR BLD AUTO: 0.1 %
BILIRUB SERPL-MCNC: 0.4 MG/DL (ref 0–1.2)
BUN SERPL-MCNC: 11 MG/DL (ref 6–23)
CALCIUM SERPL-MCNC: 9.9 MG/DL (ref 8.6–10.3)
CHLORIDE SERPL-SCNC: 97 MMOL/L (ref 98–107)
CO2 SERPL-SCNC: 30 MMOL/L (ref 21–32)
CREAT SERPL-MCNC: 0.7 MG/DL (ref 0.5–1.3)
EGFRCR SERPLBLD CKD-EPI 2021: >90 ML/MIN/1.73M*2
EOSINOPHIL # BLD AUTO: 0.03 X10*3/UL (ref 0–0.7)
EOSINOPHIL NFR BLD AUTO: 0.4 %
ERYTHROCYTE [DISTWIDTH] IN BLOOD BY AUTOMATED COUNT: 14.4 % (ref 11.5–14.5)
GLUCOSE SERPL-MCNC: 120 MG/DL (ref 74–99)
HCT VFR BLD AUTO: 43.9 % (ref 41–52)
HGB BLD-MCNC: 14.2 G/DL (ref 13.5–17.5)
HOLD SPECIMEN: NORMAL
IMM GRANULOCYTES # BLD AUTO: 0.1 X10*3/UL (ref 0–0.7)
IMM GRANULOCYTES NFR BLD AUTO: 1.2 % (ref 0–0.9)
LYMPHOCYTES # BLD AUTO: 0.71 X10*3/UL (ref 1.2–4.8)
LYMPHOCYTES NFR BLD AUTO: 8.5 %
MAGNESIUM SERPL-MCNC: 2.19 MG/DL (ref 1.6–2.4)
MCH RBC QN AUTO: 26.4 PG (ref 26–34)
MCHC RBC AUTO-ENTMCNC: 32.3 G/DL (ref 32–36)
MCV RBC AUTO: 82 FL (ref 80–100)
MONOCYTES # BLD AUTO: 0.86 X10*3/UL (ref 0.1–1)
MONOCYTES NFR BLD AUTO: 10.2 %
NEUTROPHILS # BLD AUTO: 6.69 X10*3/UL (ref 1.2–7.7)
NEUTROPHILS NFR BLD AUTO: 79.6 %
NRBC BLD-RTO: 0 /100 WBCS (ref 0–0)
PLATELET # BLD AUTO: 260 X10*3/UL (ref 150–450)
POTASSIUM SERPL-SCNC: 4.3 MMOL/L (ref 3.5–5.3)
PROT SERPL-MCNC: 8.2 G/DL (ref 6.4–8.2)
RBC # BLD AUTO: 5.37 X10*6/UL (ref 4.5–5.9)
SODIUM SERPL-SCNC: 134 MMOL/L (ref 136–145)
WBC # BLD AUTO: 8.4 X10*3/UL (ref 4.4–11.3)

## 2024-08-09 PROCEDURE — 74018 RADEX ABDOMEN 1 VIEW: CPT

## 2024-08-09 PROCEDURE — 71045 X-RAY EXAM CHEST 1 VIEW: CPT | Performed by: RADIOLOGY

## 2024-08-09 PROCEDURE — 83735 ASSAY OF MAGNESIUM: CPT

## 2024-08-09 PROCEDURE — 71045 X-RAY EXAM CHEST 1 VIEW: CPT

## 2024-08-09 PROCEDURE — 80053 COMPREHEN METABOLIC PANEL: CPT

## 2024-08-09 PROCEDURE — 99285 EMERGENCY DEPT VISIT HI MDM: CPT | Performed by: EMERGENCY MEDICINE

## 2024-08-09 PROCEDURE — 74176 CT ABD & PELVIS W/O CONTRAST: CPT | Performed by: STUDENT IN AN ORGANIZED HEALTH CARE EDUCATION/TRAINING PROGRAM

## 2024-08-09 PROCEDURE — 2500000005 HC RX 250 GENERAL PHARMACY W/O HCPCS

## 2024-08-09 PROCEDURE — 85025 COMPLETE CBC W/AUTO DIFF WBC: CPT

## 2024-08-09 PROCEDURE — 74176 CT ABD & PELVIS W/O CONTRAST: CPT

## 2024-08-09 PROCEDURE — 36415 COLL VENOUS BLD VENIPUNCTURE: CPT

## 2024-08-09 PROCEDURE — 99284 EMERGENCY DEPT VISIT MOD MDM: CPT | Mod: 25

## 2024-08-09 PROCEDURE — 74018 RADEX ABDOMEN 1 VIEW: CPT | Performed by: RADIOLOGY

## 2024-08-09 RX ORDER — LIDOCAINE HYDROCHLORIDE 20 MG/ML
1 JELLY TOPICAL ONCE
Status: COMPLETED | OUTPATIENT
Start: 2024-08-09 | End: 2024-08-09

## 2024-08-09 RX ORDER — LIDOCAINE HYDROCHLORIDE 20 MG/ML
JELLY TOPICAL
Status: COMPLETED
Start: 2024-08-09 | End: 2024-08-09

## 2024-08-09 RX ADMIN — LIDOCAINE HYDROCHLORIDE 1 APPLICATION: 20 JELLY TOPICAL at 18:10

## 2024-08-09 ASSESSMENT — COLUMBIA-SUICIDE SEVERITY RATING SCALE - C-SSRS
2. HAVE YOU ACTUALLY HAD ANY THOUGHTS OF KILLING YOURSELF?: NO
1. IN THE PAST MONTH, HAVE YOU WISHED YOU WERE DEAD OR WISHED YOU COULD GO TO SLEEP AND NOT WAKE UP?: NO
6. HAVE YOU EVER DONE ANYTHING, STARTED TO DO ANYTHING, OR PREPARED TO DO ANYTHING TO END YOUR LIFE?: NO

## 2024-08-09 ASSESSMENT — LIFESTYLE VARIABLES
TOTAL SCORE: 0
EVER FELT BAD OR GUILTY ABOUT YOUR DRINKING: NO
EVER HAD A DRINK FIRST THING IN THE MORNING TO STEADY YOUR NERVES TO GET RID OF A HANGOVER: NO
HAVE YOU EVER FELT YOU SHOULD CUT DOWN ON YOUR DRINKING: NO
HAVE PEOPLE ANNOYED YOU BY CRITICIZING YOUR DRINKING: NO

## 2024-08-09 ASSESSMENT — PAIN - FUNCTIONAL ASSESSMENT
PAIN_FUNCTIONAL_ASSESSMENT: UNABLE TO SELF-REPORT
PAIN_FUNCTIONAL_ASSESSMENT: UNABLE TO SELF-REPORT

## 2024-08-10 VITALS
RESPIRATION RATE: 16 BRPM | SYSTOLIC BLOOD PRESSURE: 157 MMHG | WEIGHT: 170 LBS | HEART RATE: 87 BPM | OXYGEN SATURATION: 96 % | TEMPERATURE: 98.1 F | HEIGHT: 69 IN | BODY MASS INDEX: 25.18 KG/M2 | DIASTOLIC BLOOD PRESSURE: 98 MMHG

## 2024-08-10 NOTE — ED PROVIDER NOTES
"Emergency Department Provider Note        History of Present Illness     History provided by: Caregiver  Limitations to History:  Patient is nonverbal, MRDD at baseline  External Records Reviewed with Brief Summary:  Medical chart review    HPI:  Maximo Pablo is a 59 y.o. male brought in by his caregiver who has not seen him in several days with a chief complaint of \"less energy\".  Patient's caregiver states he has not seen the patient in a few days however he arrives today and was informed by nursing that the patient seemed to not have the same energy that he had before.  Additionally he was told that the patient showed signs of abdominal distress.  Patient's caregiver was concerned because the patient has a history of bowel obstruction in the past per the caregiver.  The patient is MRDD from a group home and nonverbal at baseline.  Patient is in no apparent distress, no distress when abdomen is palpated, patient's caregiver states there has been no nausea no vomiting no complaints of chest pain no cough no fever no chills no syncopal episodes, no noted blood in stool or urine.  Additionally he states he believes the patient had constipation however the patient had a bowel movement while waiting in triage.    Physical Exam   Triage vitals:  T 36.8 °C (98.2 °F)  HR 81  /85  RR 15  O2 96 % None (Room air)    General: Awake, alert, in no acute distress  Eyes: Gaze conjugate.  No scleral icterus or injection  HENT: Normo-cephalic, atraumatic. No stridor  CV: Regular rate, regular rhythm. Radial pulses 2+ bilaterally  Resp: Breathing non-labored, speaking in full sentences.  Clear to auscultation bilaterally  GI: Soft, non-distended, non-tender. No rebound or guarding.  : Deferred  MSK/Extremities: No gross bony deformities. Moving all extremities  Skin: Warm. Appropriate color  Neuro: Alert.  At neurological baseline, no cranial nerve or focal deficits noted, NIH 0  Psych: Unable to fully assess given " "patient's baseline mental status however he is noncombative and appears content  Medical Decision Making & ED Course   Medical Decision Makin y.o. male brought in from group home for evaluation of \"less energy\".  Under the patient's caregivers concerns for small bowel obstruction and x-ray of the abdomen was ordered, this was unable to rule out small bowel obstruction so a CT scan of the abdomen pelvis was performed.  Chest x-ray to rule out pneumonia, urinalysis, CBC, CMP to evaluate for organic causes of \"less energy\".  Please see ED course for further medical decision making.  ----      Differential diagnoses considered include but are not limited to: Somnolence, fatigue, failure to thrive, small bowel obstruction, pneumonia, electrolyte imbalance, anemia     Social Determinants of Health which Significantly Impact Care:  None        Independent Result Review and Interpretation: Relevant laboratory and radiographic results were reviewed and independently interpreted by myself.  As necessary, they are commented on in the ED Course.    Chronic conditions affecting the patient's care: As documented above in [facility]    The patient was discussed with the following consultants/services: None    Care Considerations: As documented above in [facility]    ED Course:  ED Course as of 08/09/24 2201   Fri Aug 09, 2024   2118 X-ray of the chest and x-ray of abdomen were interpreted as: Multiple dilated small bowel loops. While these are grossly similar  to a previous examination of August 10, 2023, the possibility of a  component of bowel obstruction is not excluded. Correlation with the  patient's clinical symptomatology and presentation can determine the  need for CT of the abdomen and pelvis for further evaluation.      No evidence of acute intrathoracic abnormality.   [PV]    CBC showed no acute electrolyte dyscrasia, the patient's alkaline phosphatase and AST were mildly elevated however similar to prior laboratory draws " and trends.  Patient was euglycemic glucose of 120, CBC shows no sign of acute leukocytosis to indicate systemic infection, stable hematocrit and hemoglobin at 14.2 and 43.9. 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  Read as: [PV]   2135 Diffuse moderate to large colonic stool burden again greatest in the  rectum which is distended up to 5.9 cm. There is a rectal wall  thickening diffusely with some perirectal stranding likely  representing active chronic stercoral colitis given the propensity to  have massive fecal impaction (08/09/2023). Recommend follow-up to  address causes of bowel hypomotility.   [PV]   2135 Patient was given a GI consult instructed to follow-up.  Patient had a bowel movement while here, given strict return precautions to return if the patient no longer has bowel movements. [PV]   2136 Patient's most recent bowel movement was at this time. [PV]   2136 Patient was given a follow-up with GI, instructed follow-up with her primary care provider.  The patient has been unable to provide urine sample at this time, the patient's caregiver has switched garde at this time.  They are eager for discharge home, states they will be able to obtain urinalysis through the patient's primary care provider.  The patient's caregivers were given strict return precautions instructed follow-up with both GI and with primary care provider.  They expressed understanding of plan of care.  Patient discharged home, arrangements were made for safe transportation for the patient back to his group home. [PV]      ED Course User Index  [PV] Jac Lee DO         Diagnoses as of 08/09/24 2201   Other fatigue   Constipation, unspecified constipation type     Disposition   Patient was discharged back to his facility with follow-up with gastroenterology as well as with his primary care provider.  Given strict return precautions, the patient was arranged to have transportation home safely  Procedures    Procedures    Patient seen and discussed with ED attending physician.    Jac Lee, DO  PGY-3 Emergency Medicine  Jac Lee,   Emergency Medicine     Jac Lee DO  Resident  08/09/24 2202       Jac Lee DO  Resident  08/09/24 2221

## 2024-08-10 NOTE — DISCHARGE INSTRUCTIONS
Please have the patient follow-up with his primary care provider.  Return the patient if he develops chest pain or you notice blood in stool or urine or if he completely cease his eating for 1 or 2 2 days.  Please also return if the patient has more than 2 episodes of vomiting or appears to be changing colors and turning gray/blue/red.  And always return a emergency department anytime if need be reevaluated

## 2024-08-11 ENCOUNTER — APPOINTMENT (OUTPATIENT)
Dept: RADIOLOGY | Facility: HOSPITAL | Age: 59
End: 2024-08-11
Payer: MEDICAID

## 2024-08-11 ENCOUNTER — HOSPITAL ENCOUNTER (INPATIENT)
Facility: HOSPITAL | Age: 59
LOS: 4 days | Discharge: HOME | End: 2024-08-16
Attending: EMERGENCY MEDICINE | Admitting: INTERNAL MEDICINE
Payer: MEDICAID

## 2024-08-11 DIAGNOSIS — K56.609 INTESTINAL OBSTRUCTION, UNSPECIFIED CAUSE, UNSPECIFIED WHETHER PARTIAL OR COMPLETE (MULTI): Primary | ICD-10-CM

## 2024-08-11 DIAGNOSIS — K21.9 GASTROESOPHAGEAL REFLUX DISEASE WITHOUT ESOPHAGITIS: ICD-10-CM

## 2024-08-11 DIAGNOSIS — J69.0 ASPIRATION PNEUMONIA OF BOTH LUNGS DUE TO GASTRIC SECRETIONS, UNSPECIFIED PART OF LUNG (MULTI): ICD-10-CM

## 2024-08-11 DIAGNOSIS — K59.01 SLOW TRANSIT CONSTIPATION: ICD-10-CM

## 2024-08-11 PROCEDURE — 85025 COMPLETE CBC W/AUTO DIFF WBC: CPT | Performed by: EMERGENCY MEDICINE

## 2024-08-11 PROCEDURE — 36415 COLL VENOUS BLD VENIPUNCTURE: CPT | Performed by: EMERGENCY MEDICINE

## 2024-08-11 PROCEDURE — 83735 ASSAY OF MAGNESIUM: CPT | Performed by: EMERGENCY MEDICINE

## 2024-08-11 PROCEDURE — 83605 ASSAY OF LACTIC ACID: CPT | Performed by: EMERGENCY MEDICINE

## 2024-08-11 PROCEDURE — 99285 EMERGENCY DEPT VISIT HI MDM: CPT | Performed by: EMERGENCY MEDICINE

## 2024-08-11 PROCEDURE — 80053 COMPREHEN METABOLIC PANEL: CPT | Performed by: EMERGENCY MEDICINE

## 2024-08-11 PROCEDURE — 87040 BLOOD CULTURE FOR BACTERIA: CPT | Mod: STJLAB | Performed by: EMERGENCY MEDICINE

## 2024-08-11 PROCEDURE — 99285 EMERGENCY DEPT VISIT HI MDM: CPT

## 2024-08-11 RX ORDER — ONDANSETRON HYDROCHLORIDE 2 MG/ML
4 INJECTION, SOLUTION INTRAVENOUS ONCE
Status: COMPLETED | OUTPATIENT
Start: 2024-08-11 | End: 2024-08-12

## 2024-08-12 ENCOUNTER — APPOINTMENT (OUTPATIENT)
Dept: RADIOLOGY | Facility: HOSPITAL | Age: 59
End: 2024-08-12
Payer: MEDICAID

## 2024-08-12 ENCOUNTER — APPOINTMENT (OUTPATIENT)
Dept: CARDIOLOGY | Facility: HOSPITAL | Age: 59
End: 2024-08-12
Payer: MEDICAID

## 2024-08-12 PROBLEM — J69.0: Status: ACTIVE | Noted: 2024-08-12

## 2024-08-12 PROBLEM — J96.01 SEPSIS WITH ACUTE HYPOXIC RESPIRATORY FAILURE (MULTI): Status: ACTIVE | Noted: 2024-08-12

## 2024-08-12 PROBLEM — R74.01 TRANSAMINITIS: Status: ACTIVE | Noted: 2024-08-12

## 2024-08-12 PROBLEM — K56.609 INTESTINAL OBSTRUCTION, UNSPECIFIED CAUSE, UNSPECIFIED WHETHER PARTIAL OR COMPLETE (MULTI): Status: ACTIVE | Noted: 2024-08-12

## 2024-08-12 PROBLEM — R65.20 SEPSIS WITH ACUTE HYPOXIC RESPIRATORY FAILURE (MULTI): Status: ACTIVE | Noted: 2024-08-12

## 2024-08-12 PROBLEM — D72.829 LEUKOCYTOSIS: Status: ACTIVE | Noted: 2024-08-12

## 2024-08-12 PROBLEM — J96.01 ACUTE HYPOXIC RESPIRATORY FAILURE (MULTI): Status: ACTIVE | Noted: 2024-08-12

## 2024-08-12 PROBLEM — E87.1 HYPONATREMIA: Status: ACTIVE | Noted: 2024-08-12

## 2024-08-12 PROBLEM — A41.9 SEPSIS WITH ACUTE HYPOXIC RESPIRATORY FAILURE (MULTI): Status: ACTIVE | Noted: 2024-08-12

## 2024-08-12 PROBLEM — K59.00 CONSTIPATION: Status: ACTIVE | Noted: 2024-08-12

## 2024-08-12 LAB
ALBUMIN SERPL BCP-MCNC: 3.1 G/DL (ref 3.4–5)
ALBUMIN SERPL BCP-MCNC: 3.9 G/DL (ref 3.4–5)
ALP SERPL-CCNC: 195 U/L (ref 33–120)
ALP SERPL-CCNC: 255 U/L (ref 33–120)
ALT SERPL W P-5'-P-CCNC: 45 U/L (ref 10–52)
ALT SERPL W P-5'-P-CCNC: 61 U/L (ref 10–52)
ANION GAP SERPL CALC-SCNC: 13 MMOL/L (ref 10–20)
ANION GAP SERPL CALC-SCNC: 15 MMOL/L (ref 10–20)
APPEARANCE UR: ABNORMAL
AST SERPL W P-5'-P-CCNC: 35 U/L (ref 9–39)
AST SERPL W P-5'-P-CCNC: 66 U/L (ref 9–39)
ATRIAL RATE: 130 BPM
BASOPHILS # BLD AUTO: 0.03 X10*3/UL (ref 0–0.1)
BASOPHILS NFR BLD AUTO: 0.3 %
BILIRUB SERPL-MCNC: 0.4 MG/DL (ref 0–1.2)
BILIRUB SERPL-MCNC: 0.7 MG/DL (ref 0–1.2)
BILIRUB UR STRIP.AUTO-MCNC: NEGATIVE MG/DL
BUN SERPL-MCNC: 22 MG/DL (ref 6–23)
BUN SERPL-MCNC: 22 MG/DL (ref 6–23)
CALCIUM SERPL-MCNC: 10 MG/DL (ref 8.6–10.3)
CALCIUM SERPL-MCNC: 9 MG/DL (ref 8.6–10.3)
CHLORIDE SERPL-SCNC: 100 MMOL/L (ref 98–107)
CHLORIDE SERPL-SCNC: 92 MMOL/L (ref 98–107)
CO2 SERPL-SCNC: 31 MMOL/L (ref 21–32)
CO2 SERPL-SCNC: 31 MMOL/L (ref 21–32)
COLOR UR: ABNORMAL
CREAT SERPL-MCNC: 0.86 MG/DL (ref 0.5–1.3)
CREAT SERPL-MCNC: 1.02 MG/DL (ref 0.5–1.3)
EGFRCR SERPLBLD CKD-EPI 2021: 86 ML/MIN/1.73M*2
EGFRCR SERPLBLD CKD-EPI 2021: >90 ML/MIN/1.73M*2
EOSINOPHIL # BLD AUTO: 0.16 X10*3/UL (ref 0–0.7)
EOSINOPHIL NFR BLD AUTO: 1.3 %
ERYTHROCYTE [DISTWIDTH] IN BLOOD BY AUTOMATED COUNT: 14.6 % (ref 11.5–14.5)
ERYTHROCYTE [DISTWIDTH] IN BLOOD BY AUTOMATED COUNT: 14.9 % (ref 11.5–14.5)
GIANT PLATELETS BLD QL SMEAR: NORMAL
GLUCOSE BLD MANUAL STRIP-MCNC: 139 MG/DL (ref 74–99)
GLUCOSE BLD MANUAL STRIP-MCNC: 147 MG/DL (ref 74–99)
GLUCOSE BLD MANUAL STRIP-MCNC: 158 MG/DL (ref 74–99)
GLUCOSE BLD MANUAL STRIP-MCNC: 173 MG/DL (ref 74–99)
GLUCOSE SERPL-MCNC: 107 MG/DL (ref 74–99)
GLUCOSE SERPL-MCNC: 165 MG/DL (ref 74–99)
GLUCOSE UR STRIP.AUTO-MCNC: NORMAL MG/DL
HCT VFR BLD AUTO: 49.7 % (ref 41–52)
HCT VFR BLD AUTO: 50.6 % (ref 41–52)
HGB BLD-MCNC: 16.1 G/DL (ref 13.5–17.5)
HGB BLD-MCNC: 16.9 G/DL (ref 13.5–17.5)
HOLD SPECIMEN: NORMAL
HOLD SPECIMEN: NORMAL
IMM GRANULOCYTES # BLD AUTO: 0.12 X10*3/UL (ref 0–0.7)
IMM GRANULOCYTES NFR BLD AUTO: 1 % (ref 0–0.9)
KETONES UR STRIP.AUTO-MCNC: ABNORMAL MG/DL
LACTATE SERPL-SCNC: 1.6 MMOL/L (ref 0.4–2)
LACTATE SERPL-SCNC: 1.6 MMOL/L (ref 0.4–2)
LEUKOCYTE ESTERASE UR QL STRIP.AUTO: ABNORMAL
LYMPHOCYTES # BLD AUTO: 1.03 X10*3/UL (ref 1.2–4.8)
LYMPHOCYTES NFR BLD AUTO: 8.6 %
MAGNESIUM SERPL-MCNC: 1.9 MG/DL (ref 1.6–2.4)
MAGNESIUM SERPL-MCNC: 2.46 MG/DL (ref 1.6–2.4)
MCH RBC QN AUTO: 26.6 PG (ref 26–34)
MCH RBC QN AUTO: 26.9 PG (ref 26–34)
MCHC RBC AUTO-ENTMCNC: 32.4 G/DL (ref 32–36)
MCHC RBC AUTO-ENTMCNC: 33.4 G/DL (ref 32–36)
MCV RBC AUTO: 80 FL (ref 80–100)
MCV RBC AUTO: 82 FL (ref 80–100)
MONOCYTES # BLD AUTO: 1.67 X10*3/UL (ref 0.1–1)
MONOCYTES NFR BLD AUTO: 14 %
NEUTROPHILS # BLD AUTO: 8.95 X10*3/UL (ref 1.2–7.7)
NEUTROPHILS NFR BLD AUTO: 74.8 %
NITRITE UR QL STRIP.AUTO: NEGATIVE
NRBC BLD-RTO: 0 /100 WBCS (ref 0–0)
NRBC BLD-RTO: 0 /100 WBCS (ref 0–0)
P AXIS: 48 DEGREES
P OFFSET: 198 MS
P ONSET: 151 MS
PH UR STRIP.AUTO: 6 [PH]
PHOSPHATE SERPL-MCNC: 3.8 MG/DL (ref 2.5–4.9)
PLATELET # BLD AUTO: 209 X10*3/UL (ref 150–450)
PLATELET # BLD AUTO: 258 X10*3/UL (ref 150–450)
POTASSIUM SERPL-SCNC: 3.6 MMOL/L (ref 3.5–5.3)
POTASSIUM SERPL-SCNC: 5.4 MMOL/L (ref 3.5–5.3)
PR INTERVAL: 142 MS
PROCALCITONIN SERPL-MCNC: 12.13 NG/ML
PROT SERPL-MCNC: 7.2 G/DL (ref 6.4–8.2)
PROT SERPL-MCNC: 9 G/DL (ref 6.4–8.2)
PROT UR STRIP.AUTO-MCNC: ABNORMAL MG/DL
Q ONSET: 222 MS
QRS COUNT: 21 BEATS
QRS DURATION: 72 MS
QT INTERVAL: 300 MS
QTC CALCULATION(BAZETT): 441 MS
QTC FREDERICIA: 388 MS
R AXIS: 36 DEGREES
RBC # BLD AUTO: 6.06 X10*6/UL (ref 4.5–5.9)
RBC # BLD AUTO: 6.29 X10*6/UL (ref 4.5–5.9)
RBC # UR STRIP.AUTO: ABNORMAL /UL
RBC #/AREA URNS AUTO: >20 /HPF
RBC MORPH BLD: NORMAL
SODIUM SERPL-SCNC: 133 MMOL/L (ref 136–145)
SODIUM SERPL-SCNC: 140 MMOL/L (ref 136–145)
SP GR UR STRIP.AUTO: 1.04
T AXIS: 61 DEGREES
T OFFSET: 372 MS
UROBILINOGEN UR STRIP.AUTO-MCNC: NORMAL MG/DL
VENTRICULAR RATE: 130 BPM
WBC # BLD AUTO: 12 X10*3/UL (ref 4.4–11.3)
WBC # BLD AUTO: 4.4 X10*3/UL (ref 4.4–11.3)
WBC #/AREA URNS AUTO: >50 /HPF

## 2024-08-12 PROCEDURE — 2500000004 HC RX 250 GENERAL PHARMACY W/ HCPCS (ALT 636 FOR OP/ED): Performed by: EMERGENCY MEDICINE

## 2024-08-12 PROCEDURE — 74176 CT ABD & PELVIS W/O CONTRAST: CPT

## 2024-08-12 PROCEDURE — 2500000004 HC RX 250 GENERAL PHARMACY W/ HCPCS (ALT 636 FOR OP/ED)

## 2024-08-12 PROCEDURE — 83735 ASSAY OF MAGNESIUM: CPT

## 2024-08-12 PROCEDURE — 99254 IP/OBS CNSLTJ NEW/EST MOD 60: CPT | Performed by: NURSE PRACTITIONER

## 2024-08-12 PROCEDURE — 84100 ASSAY OF PHOSPHORUS: CPT

## 2024-08-12 PROCEDURE — C9254 INJECTION, LACOSAMIDE: HCPCS

## 2024-08-12 PROCEDURE — 96365 THER/PROPH/DIAG IV INF INIT: CPT

## 2024-08-12 PROCEDURE — 74018 RADEX ABDOMEN 1 VIEW: CPT

## 2024-08-12 PROCEDURE — 85027 COMPLETE CBC AUTOMATED: CPT

## 2024-08-12 PROCEDURE — 81001 URINALYSIS AUTO W/SCOPE: CPT | Performed by: EMERGENCY MEDICINE

## 2024-08-12 PROCEDURE — 74176 CT ABD & PELVIS W/O CONTRAST: CPT | Performed by: STUDENT IN AN ORGANIZED HEALTH CARE EDUCATION/TRAINING PROGRAM

## 2024-08-12 PROCEDURE — C9113 INJ PANTOPRAZOLE SODIUM, VIA: HCPCS

## 2024-08-12 PROCEDURE — 99291 CRITICAL CARE FIRST HOUR: CPT

## 2024-08-12 PROCEDURE — 5A0935A ASSISTANCE WITH RESPIRATORY VENTILATION, LESS THAN 24 CONSECUTIVE HOURS, HIGH NASAL FLOW/VELOCITY: ICD-10-PCS

## 2024-08-12 PROCEDURE — 71045 X-RAY EXAM CHEST 1 VIEW: CPT

## 2024-08-12 PROCEDURE — 74018 RADEX ABDOMEN 1 VIEW: CPT | Performed by: RADIOLOGY

## 2024-08-12 PROCEDURE — 96375 TX/PRO/DX INJ NEW DRUG ADDON: CPT

## 2024-08-12 PROCEDURE — 74018 RADEX ABDOMEN 1 VIEW: CPT | Performed by: STUDENT IN AN ORGANIZED HEALTH CARE EDUCATION/TRAINING PROGRAM

## 2024-08-12 PROCEDURE — 96376 TX/PRO/DX INJ SAME DRUG ADON: CPT

## 2024-08-12 PROCEDURE — 36415 COLL VENOUS BLD VENIPUNCTURE: CPT

## 2024-08-12 PROCEDURE — 99222 1ST HOSP IP/OBS MODERATE 55: CPT | Performed by: PSYCHIATRY & NEUROLOGY

## 2024-08-12 PROCEDURE — 2500000005 HC RX 250 GENERAL PHARMACY W/O HCPCS

## 2024-08-12 PROCEDURE — 84145 PROCALCITONIN (PCT): CPT | Mod: STJLAB

## 2024-08-12 PROCEDURE — 80053 COMPREHEN METABOLIC PANEL: CPT

## 2024-08-12 PROCEDURE — 99255 IP/OBS CONSLTJ NEW/EST HI 80: CPT

## 2024-08-12 PROCEDURE — 87086 URINE CULTURE/COLONY COUNT: CPT | Mod: STJLAB | Performed by: EMERGENCY MEDICINE

## 2024-08-12 PROCEDURE — 82947 ASSAY GLUCOSE BLOOD QUANT: CPT

## 2024-08-12 PROCEDURE — 93005 ELECTROCARDIOGRAM TRACING: CPT

## 2024-08-12 PROCEDURE — 2500000005 HC RX 250 GENERAL PHARMACY W/O HCPCS: Performed by: EMERGENCY MEDICINE

## 2024-08-12 PROCEDURE — 2500000001 HC RX 250 WO HCPCS SELF ADMINISTERED DRUGS (ALT 637 FOR MEDICARE OP)

## 2024-08-12 PROCEDURE — 71045 X-RAY EXAM CHEST 1 VIEW: CPT | Performed by: RADIOLOGY

## 2024-08-12 PROCEDURE — 94660 CPAP INITIATION&MGMT: CPT

## 2024-08-12 PROCEDURE — 2020000001 HC ICU ROOM DAILY

## 2024-08-12 PROCEDURE — 83605 ASSAY OF LACTIC ACID: CPT

## 2024-08-12 PROCEDURE — 96361 HYDRATE IV INFUSION ADD-ON: CPT

## 2024-08-12 RX ORDER — ONDANSETRON HYDROCHLORIDE 2 MG/ML
4 INJECTION, SOLUTION INTRAVENOUS EVERY 4 HOURS PRN
Status: DISCONTINUED | OUTPATIENT
Start: 2024-08-12 | End: 2024-08-16 | Stop reason: HOSPADM

## 2024-08-12 RX ORDER — ONDANSETRON HYDROCHLORIDE 2 MG/ML
4 INJECTION, SOLUTION INTRAVENOUS ONCE
Status: COMPLETED | OUTPATIENT
Start: 2024-08-12 | End: 2024-08-12

## 2024-08-12 RX ORDER — POTASSIUM CHLORIDE 750 MG/1
20 CAPSULE, EXTENDED RELEASE ORAL EVERY MORNING
COMMUNITY

## 2024-08-12 RX ORDER — SODIUM CHLORIDE, SODIUM LACTATE, POTASSIUM CHLORIDE, CALCIUM CHLORIDE 600; 310; 30; 20 MG/100ML; MG/100ML; MG/100ML; MG/100ML
75 INJECTION, SOLUTION INTRAVENOUS CONTINUOUS
Status: ACTIVE | OUTPATIENT
Start: 2024-08-12 | End: 2024-08-13

## 2024-08-12 RX ORDER — LEVETIRACETAM 500 MG/1
500 TABLET ORAL 2 TIMES DAILY
Status: DISCONTINUED | OUTPATIENT
Start: 2024-08-12 | End: 2024-08-12

## 2024-08-12 RX ORDER — AMLODIPINE BESYLATE 10 MG/1
10 TABLET ORAL DAILY
Status: DISCONTINUED | OUTPATIENT
Start: 2024-08-12 | End: 2024-08-16 | Stop reason: HOSPADM

## 2024-08-12 RX ORDER — LORAZEPAM 2 MG/ML
1 INJECTION INTRAMUSCULAR ONCE
Status: COMPLETED | OUTPATIENT
Start: 2024-08-12 | End: 2024-08-12

## 2024-08-12 RX ORDER — BISACODYL 10 MG/1
10 SUPPOSITORY RECTAL DAILY
Status: DISCONTINUED | OUTPATIENT
Start: 2024-08-13 | End: 2024-08-13

## 2024-08-12 RX ORDER — METOCLOPRAMIDE HYDROCHLORIDE 5 MG/ML
10 INJECTION INTRAMUSCULAR; INTRAVENOUS ONCE
Status: COMPLETED | OUTPATIENT
Start: 2024-08-12 | End: 2024-08-12

## 2024-08-12 RX ORDER — LABETALOL 100 MG/1
100 TABLET, FILM COATED ORAL 2 TIMES DAILY
Status: DISCONTINUED | OUTPATIENT
Start: 2024-08-12 | End: 2024-08-16 | Stop reason: HOSPADM

## 2024-08-12 RX ORDER — BISACODYL 10 MG/1
10 SUPPOSITORY RECTAL DAILY PRN
Status: DISCONTINUED | OUTPATIENT
Start: 2024-08-12 | End: 2024-08-12

## 2024-08-12 RX ORDER — MULTIVIT-MIN/IRON FUM/FOLIC AC 7.5 MG-4
1 TABLET ORAL DAILY
COMMUNITY

## 2024-08-12 RX ORDER — OXCARBAZEPINE 150 MG/1
150 TABLET, FILM COATED ORAL 2 TIMES DAILY
Status: DISCONTINUED | OUTPATIENT
Start: 2024-08-12 | End: 2024-08-12

## 2024-08-12 RX ORDER — FENTANYL CITRATE 50 UG/ML
50 INJECTION, SOLUTION INTRAMUSCULAR; INTRAVENOUS ONCE
Status: COMPLETED | OUTPATIENT
Start: 2024-08-12 | End: 2024-08-12

## 2024-08-12 RX ORDER — LIDOCAINE HYDROCHLORIDE 20 MG/ML
1 JELLY TOPICAL ONCE
Status: COMPLETED | OUTPATIENT
Start: 2024-08-12 | End: 2024-08-12

## 2024-08-12 RX ORDER — NAPROXEN SODIUM 220 MG/1
81 TABLET, FILM COATED ORAL
Status: DISCONTINUED | OUTPATIENT
Start: 2024-08-12 | End: 2024-08-16 | Stop reason: HOSPADM

## 2024-08-12 RX ORDER — CLONIDINE HYDROCHLORIDE 0.2 MG/1
0.2 TABLET ORAL 2 TIMES DAILY
Status: DISCONTINUED | OUTPATIENT
Start: 2024-08-12 | End: 2024-08-16 | Stop reason: HOSPADM

## 2024-08-12 RX ORDER — DEXTROSE 50 % IN WATER (D50W) INTRAVENOUS SYRINGE
25
Status: DISCONTINUED | OUTPATIENT
Start: 2024-08-12 | End: 2024-08-16 | Stop reason: HOSPADM

## 2024-08-12 RX ORDER — DEXTROSE 50 % IN WATER (D50W) INTRAVENOUS SYRINGE
12.5
Status: DISCONTINUED | OUTPATIENT
Start: 2024-08-12 | End: 2024-08-16 | Stop reason: HOSPADM

## 2024-08-12 RX ORDER — PANTOPRAZOLE SODIUM 40 MG/10ML
40 INJECTION, POWDER, LYOPHILIZED, FOR SOLUTION INTRAVENOUS
Status: DISCONTINUED | OUTPATIENT
Start: 2024-08-12 | End: 2024-08-16 | Stop reason: HOSPADM

## 2024-08-12 RX ORDER — HYDROCHLOROTHIAZIDE 12.5 MG/1
12.5 TABLET ORAL DAILY
Status: DISCONTINUED | OUTPATIENT
Start: 2024-08-12 | End: 2024-08-16 | Stop reason: HOSPADM

## 2024-08-12 RX ORDER — CHLORHEXIDINE GLUCONATE ORAL RINSE 1.2 MG/ML
15 SOLUTION DENTAL 2 TIMES DAILY
Status: DISCONTINUED | OUTPATIENT
Start: 2024-08-12 | End: 2024-08-16 | Stop reason: HOSPADM

## 2024-08-12 RX ORDER — POTASSIUM CHLORIDE 14.9 MG/ML
20 INJECTION INTRAVENOUS ONCE
Status: COMPLETED | OUTPATIENT
Start: 2024-08-12 | End: 2024-08-12

## 2024-08-12 RX ORDER — AMOXICILLIN 250 MG
1 CAPSULE ORAL 2 TIMES DAILY
COMMUNITY

## 2024-08-12 RX ORDER — HEPARIN SODIUM 5000 [USP'U]/ML
5000 INJECTION, SOLUTION INTRAVENOUS; SUBCUTANEOUS EVERY 8 HOURS SCHEDULED
Status: DISCONTINUED | OUTPATIENT
Start: 2024-08-12 | End: 2024-08-12

## 2024-08-12 RX ORDER — ENOXAPARIN SODIUM 100 MG/ML
40 INJECTION SUBCUTANEOUS EVERY 24 HOURS
Status: DISCONTINUED | OUTPATIENT
Start: 2024-08-12 | End: 2024-08-16 | Stop reason: HOSPADM

## 2024-08-12 RX ORDER — LISINOPRIL 20 MG/1
20 TABLET ORAL 2 TIMES DAILY
Status: DISCONTINUED | OUTPATIENT
Start: 2024-08-12 | End: 2024-08-16 | Stop reason: HOSPADM

## 2024-08-12 RX ORDER — LEVETIRACETAM 5 MG/ML
500 INJECTION INTRAVASCULAR EVERY 12 HOURS
Status: DISCONTINUED | OUTPATIENT
Start: 2024-08-12 | End: 2024-08-15

## 2024-08-12 SDOH — SOCIAL STABILITY: SOCIAL INSECURITY: ABUSE: ADULT

## 2024-08-12 SDOH — SOCIAL STABILITY: SOCIAL INSECURITY: WERE YOU ABLE TO COMPLETE ALL THE BEHAVIORAL HEALTH SCREENINGS?: YES

## 2024-08-12 SDOH — SOCIAL STABILITY: SOCIAL INSECURITY: HAVE YOU HAD THOUGHTS OF HARMING ANYONE ELSE?: NO

## 2024-08-12 SDOH — SOCIAL STABILITY: SOCIAL INSECURITY: ARE THERE ANY APPARENT SIGNS OF INJURIES/BEHAVIORS THAT COULD BE RELATED TO ABUSE/NEGLECT?: UNABLE TO ASSESS

## 2024-08-12 SDOH — SOCIAL STABILITY: SOCIAL INSECURITY: ARE YOU OR HAVE YOU BEEN THREATENED OR ABUSED PHYSICALLY, EMOTIONALLY, OR SEXUALLY BY ANYONE?: UNABLE TO ASSESS

## 2024-08-12 SDOH — SOCIAL STABILITY: SOCIAL INSECURITY: HAVE YOU HAD ANY THOUGHTS OF HARMING ANYONE ELSE?: UNABLE TO ASSESS

## 2024-08-12 SDOH — SOCIAL STABILITY: SOCIAL INSECURITY: DOES ANYONE TRY TO KEEP YOU FROM HAVING/CONTACTING OTHER FRIENDS OR DOING THINGS OUTSIDE YOUR HOME?: UNABLE TO ASSESS

## 2024-08-12 SDOH — SOCIAL STABILITY: SOCIAL INSECURITY: HAS ANYONE EVER THREATENED TO HURT YOUR FAMILY OR YOUR PETS?: UNABLE TO ASSESS

## 2024-08-12 SDOH — SOCIAL STABILITY: SOCIAL INSECURITY: DO YOU FEEL UNSAFE GOING BACK TO THE PLACE WHERE YOU ARE LIVING?: UNABLE TO ASSESS

## 2024-08-12 SDOH — SOCIAL STABILITY: SOCIAL INSECURITY: DO YOU FEEL ANYONE HAS EXPLOITED OR TAKEN ADVANTAGE OF YOU FINANCIALLY OR OF YOUR PERSONAL PROPERTY?: UNABLE TO ASSESS

## 2024-08-12 ASSESSMENT — COGNITIVE AND FUNCTIONAL STATUS - GENERAL
MOVING FROM LYING ON BACK TO SITTING ON SIDE OF FLAT BED WITH BEDRAILS: TOTAL
PERSONAL GROOMING: TOTAL
PATIENT BASELINE BEDBOUND: YES
TOILETING: TOTAL
MOBILITY SCORE: 6
CLIMB 3 TO 5 STEPS WITH RAILING: TOTAL
EATING MEALS: TOTAL
STANDING UP FROM CHAIR USING ARMS: TOTAL
DRESSING REGULAR LOWER BODY CLOTHING: TOTAL
DRESSING REGULAR UPPER BODY CLOTHING: TOTAL
MOVING TO AND FROM BED TO CHAIR: TOTAL
HELP NEEDED FOR BATHING: TOTAL
WALKING IN HOSPITAL ROOM: TOTAL
DAILY ACTIVITIY SCORE: 6
TURNING FROM BACK TO SIDE WHILE IN FLAT BAD: TOTAL

## 2024-08-12 ASSESSMENT — ACTIVITIES OF DAILY LIVING (ADL)
GROOMING: DEPENDENT
DRESSING YOURSELF: DEPENDENT
BATHING: DEPENDENT
LACK_OF_TRANSPORTATION: PATIENT UNABLE TO ANSWER
ADEQUATE_TO_COMPLETE_ADL: UNABLE TO ASSESS
ASSISTIVE_DEVICE: WHEELCHAIR
PATIENT'S MEMORY ADEQUATE TO SAFELY COMPLETE DAILY ACTIVITIES?: UNABLE TO ASSESS
FEEDING YOURSELF: DEPENDENT
HEARING - RIGHT EAR: FUNCTIONAL
HEARING - LEFT EAR: FUNCTIONAL
WALKS IN HOME: DEPENDENT
TOILETING: DEPENDENT
JUDGMENT_ADEQUATE_SAFELY_COMPLETE_DAILY_ACTIVITIES: UNABLE TO ASSESS

## 2024-08-12 ASSESSMENT — LIFESTYLE VARIABLES
HOW MANY STANDARD DRINKS CONTAINING ALCOHOL DO YOU HAVE ON A TYPICAL DAY: PATIENT UNABLE TO ANSWER
AUDIT-C TOTAL SCORE: -1
EVER FELT BAD OR GUILTY ABOUT YOUR DRINKING: NO
HOW OFTEN DO YOU HAVE A DRINK CONTAINING ALCOHOL: PATIENT UNABLE TO ANSWER
EVER HAD A DRINK FIRST THING IN THE MORNING TO STEADY YOUR NERVES TO GET RID OF A HANGOVER: NO
TOTAL SCORE: 0
HOW OFTEN DO YOU HAVE 6 OR MORE DRINKS ON ONE OCCASION: PATIENT UNABLE TO ANSWER
HAVE PEOPLE ANNOYED YOU BY CRITICIZING YOUR DRINKING: NO
SKIP TO QUESTIONS 9-10: 0
HAVE YOU EVER FELT YOU SHOULD CUT DOWN ON YOUR DRINKING: NO
AUDIT-C TOTAL SCORE: -1

## 2024-08-12 ASSESSMENT — PAIN - FUNCTIONAL ASSESSMENT
PAIN_FUNCTIONAL_ASSESSMENT: CPOT (CRITICAL CARE PAIN OBSERVATION TOOL)
PAIN_FUNCTIONAL_ASSESSMENT: CPOT (CRITICAL CARE PAIN OBSERVATION TOOL)
PAIN_FUNCTIONAL_ASSESSMENT: 0-10
PAIN_FUNCTIONAL_ASSESSMENT: CPOT (CRITICAL CARE PAIN OBSERVATION TOOL)

## 2024-08-12 ASSESSMENT — PAIN DESCRIPTION - PROGRESSION: CLINICAL_PROGRESSION: NOT CHANGED

## 2024-08-12 ASSESSMENT — PATIENT HEALTH QUESTIONNAIRE - PHQ9
1. LITTLE INTEREST OR PLEASURE IN DOING THINGS: NOT AT ALL
SUM OF ALL RESPONSES TO PHQ9 QUESTIONS 1 & 2: 0
2. FEELING DOWN, DEPRESSED OR HOPELESS: NOT AT ALL

## 2024-08-12 NOTE — PROGRESS NOTES
08/12/24 1032   Discharge Planning   Living Arrangements Other (Comment)   Support Systems Home care staff   Assistance Needed total care   Type of Residence care home   Care Facility Name [facility] or Post Acute Services In home services   Type of Home Care Services Attendant   Expected Discharge Disposition Other   Does the patient need discharge transport arranged? Yes   RoundTrip coordination needed? Yes     Pt admitted for intestinal obstruction. H/O cerebral palsy and is nonverbal at bedside. Called caregiver Aminah [phone]. Pt is long time resident at [facility]. Total care at baseline. Aminah voiced frustration with patient being sent to ER 8/9. ER discharged pt that evening and Aminah had to send the patient right back. No surgery planned at this time. Plan to discharge patient back to his group home when he is medically ready. Pt will need transport.

## 2024-08-12 NOTE — ED NOTES
Late entry assessment note: patient arrived to ER with c/o of multiple episodes of vomiting; patient was here for similar complaints 2 days ago;; patient has had repeated emesis since arrival despite zofran; CT of abdomen was delayed due to multiple critical patients before him; NGT placed to 60 cm at nares; KUB taken' resident stated to advance 10 cm; awaiting 2nd KUB; 700 ccs of dark brown thick fluid in cannister; patients sats have continued to decrease during stay; initially patient was on 3 liters nasal cannula to keep sats >93%; after multiple episodes of emesis patients sats decreased and required increasing O2 demands; Dr Cordova aware and RT called to bedside; Patient originally placed on 50% VM but failed to maintain a sat of 90%; patient placed back on NRB mask     Nancy Guido RN  08/12/24 1358

## 2024-08-12 NOTE — CARE PLAN
The patient's goals for the shift include  patient will have adequate output thru NGT by end of shift.    The clinical goals for the shift include Patient will have a bowel movement by end of shift.    Over the shift, the patient did not make progress toward the following goals. Barriers to progression include SBO. Recommendations to address these barriers include bowel regimen, NGT to LIWS.

## 2024-08-12 NOTE — PROGRESS NOTES
Critical Care Daily Progress        Subjective   Patient is a 59 y.o. male admitted on 8/11/2024 10:35 PM with the following indication(s) for ICU care small bowel obstruction with hypoxic respiratory failure secondary to aspiration requiring HFNC    Overnight Events: None    Complaints: Patient is nonverbal at baseline.    Scheduled Medications:   [Held by provider] amLODIPine, 10 mg, oral, Daily  [Held by provider] aspirin, 81 mg, oral, Daily  chlorhexidine, 15 mL, Mouth/Throat, BID  [Held by provider] cloNIDine, 0.2 mg, oral, BID  enoxaparin, 40 mg, subcutaneous, q24h  [Held by provider] hydroCHLOROthiazide, 12.5 mg, oral, Daily  [Held by provider] labetalol, 100 mg, oral, BID  lacosamide, 100 mg, intravenous, q12h  levETIRAcetam, 500 mg, intravenous, q12h  [Held by provider] lisinopril, 20 mg, oral, BID  [Held by provider] OXcarbazepine, 450 mg, oral, BID  oxygen, , inhalation, Continuous - Inhalation  pantoprazole, 40 mg, intravenous, Daily before breakfast  piperacillin-tazobactam, 4.5 g, intravenous, q8h  sodium phosphates, 1 enema, rectal, Daily         Continuous Medications:   lactated Ringer's, 75 mL/hr, Last Rate: 75 mL/hr (08/12/24 1639)         PRN Medications:   PRN medications: bisacodyl, dextrose, dextrose, glucagon, glucagon, ondansetron    Objective   Vitals:  Temp  Min: 36.5 °C (97.7 °F)  Max: 38.2 °C (100.8 °F)  Pulse  Min: 88  Max: 140  BP  Min: 84/55  Max: 180/128  Resp  Min: 14  Max: 45  SpO2  Min: 85 %  Max: 100 %    Physical Exam:   Physical Exam   Physical Exam  Vitals and nursing note reviewed.   Constitutional:       General: He is not in acute distress.     Appearance: He is ill-appearing. He is not toxic-appearing or diaphoretic.   HENT:      Head: Normocephalic and atraumatic.      Right Ear: External ear normal.      Left Ear: External ear normal.      Nose: Nose normal.      Comments: NG tube in place     Mouth/Throat:      Pharynx: Oropharynx is clear.   Eyes:       Conjunctiva/sclera: Conjunctivae normal.   Cardiovascular:      Rate and Rhythm: Regular rhythm. Tachycardia present.      Pulses: Normal pulses.      Heart sounds: Normal heart sounds.   Pulmonary:      Comments:  Diminished right side, rhonchi RLL, clear left side  Abdominal:      General: Abdomen is flat. There is no distension.      Palpations: Abdomen is soft. There is no mass.      Tenderness: There is no guarding.      Hernia: No hernia is present.      Comments:  Hypoactive bowel sounds, brown emesis from NGT   Genitourinary:     Comments: External catheter in place.  Musculoskeletal:      Cervical back: Normal range of motion and neck supple. No rigidity.      Comments: Contracted upper and lower extremities at baseline.   Lymphadenopathy:      Cervical: No cervical adenopathy.   Neurological:      Mental Status: Mental status is at baseline.      Comments: Contracted upper and lower extremities at baseline, nonverbal, opens eyes spontaneously, tracks objects                Assessment/Plan   Overall Assessment:    59-year-old male with past medical history significant for prior small bowel obstruction, constipation with impaction, cerebral palsy and epilepsy, MRDD nonverbal at baseline admitted to the ICU for close hemodynamic monitoring and management of high-grade small bowel obstruction with acute hypoxic respiratory failure on high flow nasal cannula secondary to aspiration pneumonitis versus aspiration pneumonia with secondary diagnoses of hyponatremia, transaminitis, and constipation.    Neuro:   - History: Cerebral palsy with severe muscle contractions, MRDD nonverbal at baseline, epilepsy   - Home meds: Trileptal and Keppra  - As patient is n.p.o. at this time patient's home dose of Keppra 500 mg twice daily with switch to IV.  There is no IV formulation for patient's Trileptal so neurology was consulted for alternate AED who advised starting patient on Vimpat 100 mg twice daily while NPO.  - CAM  ICU    Cardiovascular:   # Sinus tachycardia  - History: Hypertension  - Goals:  [MAP> 65, HR ]  - Home meds: Clonidine, Norvasc, labetalol, hydrochlorothiazide, lisinopril  - Last echo: None on file  -Holding home blood pressure medications at this time we will consider reintroducing blood pressure medications if patient becomes hypertensive    Pulmonary:   # Aspiration pneumonia versus pneumonitis  # Hypoxic respiratory failure.  -Currently saturating in the high 90s on 2 L nasal cannula  Continuous pulse oximetry   O2 PRN to maintain SpO2 > 94%, wean as tolerated  -CXR: low lung volumes with bronchovascular crowding, bibasilar opacities  -CT: Aspiration pneumonia in the lingula secondary to large volume gastroesophageal reflux from high-grade bowel obstruction.    Gastrointestinal:   # Partial versus complete small bowel obstruction  Results from last 7 days   Lab Units 08/12/24  0551 08/11/24  2336 08/09/24  1841   ALK PHOS U/L 195* 255* 215*   AST U/L 35 66* 48*   ALT U/L 45 61* 48       - History: Constipation, SBO  - Home meds: MiraLAX, Colace and senna  - Diet: N.p.o.   - NG tube to LIWS.  500 cc output from NG tube this morning.  - GI and general surgery on consult.  Appreciate recs.  - Plan for SBFT within the next 1 to 2 days  - Prophylaxis: protonix  - Bowel regimen: Fleet enema, Dulcolax suppositories  - Last BM: Last BM Date: 08/12/24     Renal:   Results from last 7 days   Lab Units 08/12/24  0551 08/11/24 2336 08/09/24  1841   SODIUM mmol/L 140 133* 134*   POTASSIUM mmol/L 3.6 5.4* 4.3   MAGNESIUM mg/dL 1.90 2.46* 2.19   PHOSPHORUS mg/dL 3.8  --   --    BUN mg/dL 22 22 11   CREATININE mg/dL 1.02 0.86 0.70       Net IO Since Admission: 2,145.25 mL [08/12/24 1927]  - Daily CMP,Mg,Phos  -External urinary catheter  - Fluids: LR 75 cc/h mL/hr  - Electrolytes: Replete per protocol, goal K>4 Phos >3 Mg >2    Endocrine:   Results from last 7 days   Lab Units 08/12/24  1538 08/12/24  1159  247 2451 24  2336   POCT GLUCOSE mg/dL 158* 173* 147*  --   --    GLUCOSE mg/dL  --   --   --  107* 165*      - BG < 180 goal.  Start sliding scale insulin if BG over 180  -Hypoglycemia protocol    Hematology:   Results from last 7 days   Lab Units 24  0551 24  2336 24  1841   HEMOGLOBIN g/dL 16.1 16.9 14.2   HEMATOCRIT % 49.7 50.6 43.9   PLATELETS AUTO x10*3/uL 209 258 260     - Daily CBC  - DVT Prophylaxis: SCDs, Lovenox    Infectious Disease:   # Aspiration pneumonia versus pneumonitis  Results from last 7 days   Lab Units 24  0551 24  1841   WBC AUTO x10*3/uL 4.4 12.0* 8.4     Temp (24hrs), Av.2 °C (99 °F), Min:36.5 °C (97.7 °F), Max:38.2 °C (100.8 °F)     - WBC downtrending from 12 to 4.4  - UA shows pyuria and hematuria with positive LE suggestive of possible cystitis  - Aspiration pneumonia versus pneumonitis on CT as well as diffuse rectal wall thickening suggestive of proctitis versus stercoral colitis  - UA and BCs pending  - Continue Zosyn pending cultures    Musculoskeletal:   - PT/OT on consult appreciate recs      LDA:  NG/OG/Feeding Tube NG - [place] sump 16 Fr Left nostril (Active)   Placement Date/Time: 24 0515   Placed by: Rocio Johnson  Hand Hygiene Completed: Yes  Type of Tube: NG/OG Tube  Tube Length: 63 cm  Tube Type: NG - [place] sump  NG/OG Tube Size: 16 Fr  Tube Location: Left nostril   Number of days: 0       External Urinary Catheter Male (Active)   Placement Date/Time: 24 1200   Earliest Known Present: 24  Placed by: Juan Clark RN  Hand Hygiene Completed: Yes  External Catheter Type: Male   Number of days: 0         CODE STATUS: Full Code    Disposition: To remain in the ICU for close monitoring    RESTRAINTS: type: None    ABCDEF Checklist  Analgesia: Spontaneous awakening trial to be pursued if clinically appropriate. RASS goal reviewed  Breathing: Spontaneous breathing trial to be pursued  if clinically appropriate. Mechanical power of assisted ventilation reviewed  Choice of analgesia/sedation: Analgesic and sedative agents adjusted per clinical context.   Delirium assessed by CAM, will avoid exacerbating factors  Early mobility and exercise: Physical and occupational therapy engaged  Family: Plan of care, overall trajectory of patient shared with family. Questions elicited and answered as appropriate.     Due to the high probability of life threatening clinical decompensation, the patient required critical care time evaluating and managing this patient.  Critical care time included obtaining a history, examining the patient, ordering and reviewing studies, discussing, developing, and implementing a management plan, evaluating the patient's response to treatment, and discussion with other care team providers. I saw and evaluated the patient myself.  Critical care time was performed exclusive of billable procedures.    Critical care time:       Case discussed with attending , Dr. Hawa Craft, DO

## 2024-08-12 NOTE — SIGNIFICANT EVENT
No bowel movements after Fleet enema.  Daily Dulcolax suppositories added.  Has had 500 cc output from NG so far this shift.  Given patient is n.p.o. unable to take Trileptal.  Switched home p.o. Keppra to IV.  Neurology consulted to advise alternate antiepileptic treatment regimen while patient is NPO.

## 2024-08-12 NOTE — CONSULTS
Department of Internal Medicine  Gastroenterology  Consult note      Reason for Consult: Bowel obstruction     Chief Complaint: Vomiting     History Obtained from: chart review and provider reports    History of Present Illness      The patient is a 59 y.o. male with significant past medical history of cerebral palsy, and MRDD nonverbal, HTN, epilepsy, prior SBO who presents from group home for vomiting.  Group home staff were concerned about the amount of vomiting and fatigue he was displaying. At presentation, he hypertensive with /128, tachycardic 119, and afebrile.  Labs are significant for hyperkalemia 5.4, Alk Phos 255, ALT 61, AST 66, hypermagnesemia, mild leukocytosis WBC 12, UA with protein, blood, ketones, esterase. Patient had previously presented to [facility] ED on 8/9 for decreased energy and constipation and was found to have mild partial or early bowel obstruction, diffuse moderate to large stool burden, stercoral colitis on CT imaging. He was discharged home with GI follow up, but was never seen. Repeat CT imaging at presentation on 8/12 showed worsening of small bowel obstruction, severe gastric dilatation and air-fluid levels, large volume gastroesophageal reflux, new diffuse duodenal dilatation, transition point in right lower quadrant, aspiration pneumonia, and proctitis/stercoral colitis. NG tube was placed and adjusted after KUB. ED provider consulted general surgery. They recommended admission. He was admitted to the ICU for monitoring of SBO and acute respiratory failure.  Patient's last known EGD in 2021, normal.  Good peristalsis was seen in the esophagus. Colonoscopy was scheduled but not completed due to insufficient prep. He has never had this completed.     The patient has prior hospitalization for SBO on 8/10/2023-8/13/2023 during which he also had a partial small bowel obstruction treated with NG tube decompression, aggressive bowel regimen with enemas and manual disimpaction. GI was  consulted during that admission and assisted with disimpaction.     GI was consulted for bowel obstruction, recurrent fecal impaction.     Allergies  Patient has no known allergies.    Current Medication    Current Facility-Administered Medications:     [Held by provider] amLODIPine (Norvasc) tablet 10 mg, 10 mg, oral, Daily, Kevin Craft DO    [Held by provider] aspirin chewable tablet 81 mg, 81 mg, oral, Daily, Kevin Craft DO    bisacodyl (Dulcolax) suppository 10 mg, 10 mg, rectal, Daily PRN, Kevin Craft DO, 10 mg at 08/12/24 1242    chlorhexidine (Peridex) 0.12 % solution 15 mL, 15 mL, Mouth/Throat, BID, Kevin Craft DO, 15 mL at 08/12/24 1328    [Held by provider] cloNIDine (Catapres) tablet 0.2 mg, 0.2 mg, oral, BID, Kevin Craft, DO    dextrose 50 % injection 12.5 g, 12.5 g, intravenous, q15 min PRN, Yesika EUSEBIA Trefny, APRN-CNP    dextrose 50 % injection 25 g, 25 g, intravenous, q15 min PRN, Yesika C Trefny, APRN-CNP    enoxaparin (Lovenox) syringe 40 mg, 40 mg, subcutaneous, q24h, Yesika C Trefny, APRN-CNP, 40 mg at 08/12/24 0844    glucagon (Glucagen) injection 1 mg, 1 mg, intramuscular, q15 min PRN, Yesika C Trefny, APRN-CNP    glucagon (Glucagen) injection 1 mg, 1 mg, intramuscular, q15 min PRN, Yesika C Trefny, APRN-CNP    [Held by provider] hydroCHLOROthiazide (Microzide) tablet 12.5 mg, 12.5 mg, oral, Daily, Kevin Craft DO    [Held by provider] labetalol (Normodyne) tablet 100 mg, 100 mg, oral, BID, Kevin Craft, DO    lactated Ringer's infusion, 75 mL/hr, intravenous, Continuous, Yesika C Trefny, APRN-CNP, Last Rate: 75 mL/hr at 08/12/24 1200, 75 mL/hr at 08/12/24 1200    levETIRAcetam (Keppra) 500 mg in sodium chloride (iso)  mL, 500 mg, intravenous, q12h, Kevin Craft DO, Stopped at 08/12/24 1342    [Held by provider] lisinopril tablet 20 mg, 20 mg, oral, BID, Kevin Craft DO    ondansetron (Zofran) injection 4 mg, 4 mg,  intravenous, q4h PRN, VIANEY Dela Cruz    [Held by provider] OXcarbazepine (Trileptal) tablet 450 mg, 450 mg, oral, BID, Kevin Craft DO    oxygen (O2) therapy, , inhalation, Continuous - Inhalation, Kevin Craft DO, 2 L/min at 08/12/24 1245    pantoprazole (ProtoNix) injection 40 mg, 40 mg, intravenous, Daily before breakfast, VIANEY Dela Cruz, 40 mg at 08/12/24 0603    piperacillin-tazobactam (Zosyn) 4.5 g in dextrose (iso)  mL, 4.5 g, intravenous, q8h, VIANEY Dela Cruz, Last Rate: 0 mL/hr at 08/12/24 1017, 4.5 g at 08/12/24 1327    sodium phosphates (Fleets) 19-7 gram/118 mL enema 1 enema, 1 enema, rectal, Daily, VIANEY Dela Cruz, 1 enema at 08/12/24 0853    Past Medical History  Active Ambulatory Problems     Diagnosis Date Noted    Other fatigue 08/09/2024     Resolved Ambulatory Problems     Diagnosis Date Noted    No Resolved Ambulatory Problems     Past Medical History:   Diagnosis Date    Epilepsy, unspecified, not intractable, without status epilepticus (Multi)     Hyperlipidemia, unspecified     Personal history of other diseases of the circulatory system     Personal history of other diseases of the nervous system and sense organs     Personal history of other endocrine, nutritional and metabolic disease     Personal history of other specified conditions        Past Surgical History  Past Surgical History:   Procedure Laterality Date    OTHER SURGICAL HISTORY  11/26/2019    No history of surgery       Family History  No family history on file.  No family history of stomach or colon cancer    Social History  TOBACCO:  has no history on file for tobacco use.  ETOH:  has no history on file for alcohol use.  DRUGS:  has no history on file for drug use.  MARITAL STATUS:   OCCUPATION:    Review of Systems  Review of Systems   Unable to perform ROS: Patient nonverbal     PHYSICAL EXAM  VS: /81   Pulse (!) 124   Temp 37.2 °C (99 °F) (Temporal)    Resp 18   Wt 72.6 kg (160 lb)   SpO2 94%   BMI 23.63 kg/m²  Body mass index is 23.63 kg/m².  Physical Exam  Vitals and nursing note reviewed.   Constitutional:       General: He is not in acute distress.     Appearance: He is ill-appearing.   HENT:      Head: Normocephalic.      Nose:      Comments: NG in place  Eyes:      Conjunctiva/sclera: Conjunctivae normal.   Cardiovascular:      Rate and Rhythm: Normal rate and regular rhythm.   Pulmonary:      Effort: Pulmonary effort is normal.      Breath sounds: Normal breath sounds.   Abdominal:      General: There is distension.      Palpations: Abdomen is soft.      Tenderness: There is no abdominal tenderness.      Hernia: A hernia (umbilical, reducible) is present.      Comments: Hypoactive bowel sounds noted in all 4 quadrants   Genitourinary:     Comments: Roblse catheter in place  Musculoskeletal:      Cervical back: Neck supple.      Right lower leg: No edema.      Left lower leg: No edema.      Comments: Patient is significantly contracted 2/2 cerebral palsy    Skin:     General: Skin is warm and dry.   Neurological:      Mental Status: Mental status is at baseline.         DATA  Recent blood work and relevant radiology and endoscopic studies were reviewed and discussed with the patient   Results from last 7 days   Lab Units 08/12/24  0551   WBC AUTO x10*3/uL 4.4   RBC AUTO x10*6/uL 6.06*   HEMOGLOBIN g/dL 16.1   HEMATOCRIT % 49.7   MCV fL 82   MCHC g/dL 32.4   RDW % 14.6*   PLATELETS AUTO x10*3/uL 209       Results from last 72 hours   Lab Units 08/12/24  0551   SODIUM mmol/L 140   POTASSIUM mmol/L 3.6   CHLORIDE mmol/L 100   CO2 mmol/L 31   BUN mg/dL 22   CREATININE mg/dL 1.02   CALCIUM mg/dL 9.0   PROTEIN TOTAL g/dL 7.2   BILIRUBIN TOTAL mg/dL 0.7   ALK PHOS U/L 195*   AST U/L 35   ALT U/L 45       RADIOLOGY REVIEW  ECG 12 Lead    Result Date: 8/12/2024  Sinus tachycardia Possible Left atrial enlargement Cannot rule out Anterior infarct , age undetermined  Abnormal ECG When compared with ECG of 14-OCT-2023 17:22, Minimal criteria for Anterior infarct are now Present ST no longer depressed in Inferior leads ST no longer elevated in Lateral leads Nonspecific T wave abnormality no longer evident in Inferior leads    XR abdomen 1 view    Result Date: 8/12/2024  Interpreted By:  Tommy Colon, STUDY: XR ABDOMEN 1 VIEW;  8/12/2024 5:45 am   INDICATION: Signs/Symptoms:Ngt replaced.   COMPARISON: 08/12/2024   ACCESSION NUMBER(S): [id number]   ORDERING CLINICIAN: OMAR HERRERA   FINDINGS: NG/OG tube terminates in the gastric body. There is persistent dilatation of small bowel loops up to 6 cm in the central abdomen.   No evidence of free intraperitoneal air.   No pneumatosis or portal venous gas identified.   No acute osseous abnormalities.   Left basilar consolidation representing known lingular airspace disease on CT abdomen/pelvis.       NG/OG tube terminates in the gastric body. There is persistent dilatation of small bowel loops up to 6 cm in the central abdomen.   Redemonstration of lingular pneumonia/aspiration pneumonitis.   MACRO: None.   Signed by: Tommy Colon 8/12/2024 5:47 AM Dictation workstation:   [id number]    XR abdomen 1 view    Result Date: 8/12/2024  Interpreted By:  Melissa Adkins, STUDY: XR ABDOMEN 1 VIEW;  8/12/2024 3:43 am   INDICATION: Signs/Symptoms:POST NG TUBE ASSESMENT. PLEASE CALL MICAH Bradford, to confirm it is inserted.   COMPARISON: Abdominal x-rays 08/09/2024 CT scan of the abdomen pelvis 08/12/2020   ACCESSION NUMBER(S): [id number]   ORDERING CLINICIAN: NADJA SAEED   FINDINGS: Study is limited secondary to patient positioning. Enteric tube is seen to the level of the lower thoracic esophagus with side hole above the GE junction. This needs to be advanced slightly.   The right lateral abdomen and pelvis are excluded from the field of view limiting evaluation. Gaseous distended dilated loops of small bowel are noted in the upper  abdomen measuring up to 5.5 cm consistent with small bowel obstruction as noted on CT. Limited evaluation of pneumoperitoneum on supine imaging. No pneumatosis or portal venous gas.  No abnormal calcifications.   Left basilar airspace opacities again noted.   Osseous structures demonstrate no acute bony changes.       1. Enteric tube is noted in the distal esophagus with side hole above the GE junction. Consider advancement and repeat imaging. 2. Multiple dilated loops of small bowel are again visualized consistent with small bowel obstruction as noted on CT. Please refer to separate report of CT for additional detail.   MACRO: None   Signed by: Melissa Adkins 8/12/2024 4:01 AM Dictation workstation:   [id number]    XR chest 1 view    Result Date: 8/12/2024  Interpreted By:  Finkelstein, Evan, STUDY: XR CHEST 1 VIEW;  8/12/2024 2:14 am   INDICATION: Signs/Symptoms:s/p ng tube placement, concern for aspiration.   COMPARISON: Chest radiograph 08/09/2024. CT abdomen pelvis 08/12/2024   ACCESSION NUMBER(S): [id number]   ORDERING CLINICIAN: NADJA SAEED   FINDINGS: NG tube courses just below the diaphragm terminating at midline in the upper abdomen. Side hole at the level of the mid esophagus.   CARDIOMEDIASTINAL SILHOUETTE: Cardiomediastinal silhouette is normal in size and configuration.   LUNGS: Low lung volumes with bronchovascular crowding. Bibasilar opacities. No pleural effusion or pneumothorax.   ABDOMEN: Prominent lucencies underlying the diaphragms bilaterally, likely corresponding to air in the stomach and colon anterior to the liver as seen on earlier same day CT abdomen pelvis.   BONES: No acute osseous abnormality.       NG tube courses just below the diaphragm terminating midline likely near the GE junction. Side hole overlying the mid esophagus. Recommend advancement. Low lung volumes with bronchovascular crowding. Bibasilar opacities may represent atelectasis with aspiration/pneumonia not excluded  in the appropriate clinical setting.   MACRO: None.   Signed by: Evan Finkelstein 8/12/2024 3:03 AM Dictation workstation:   [id number]    CT abdomen pelvis wo IV contrast    Result Date: 8/12/2024  Interpreted By:  Tommy Colon, STUDY: CT ABDOMEN PELVIS WO IV CONTRAST;  8/12/2024 12:56 am   INDICATION: Signs/Symptoms:abd pain.   COMPARISON: 08/09/2024 CT abdomen/pelvis and 08/09/2022 CT abdomen/pelvis   ACCESSION NUMBER(S): [id number]   ORDERING CLINICIAN: NADJA SAEED   TECHNIQUE: Contiguous axial images of the abdomen and pelvis were obtained without intravenous contrast. Coronal and sagittal reformatted images were reconstructed from the axial data.   FINDINGS: Note: The absence of intravenous contrast limits evaluation of the solid organs and vasculature.   LOWER CHEST: There new consolidation in the lingula. There are patchy atelectatic and ground-glass opacities in the lower lobes.     ABDOMEN/PELVIS:   ABDOMINAL WALL: There is a small fat containing umbilical hernia and laxity of the linea alba.   LIVER: The liver demonstrates a normal noncontrast attenuation.   BILE DUCTS: No significant intrahepatic or extrahepatic dilatation.   GALLBLADDER: No significant abnormality.   PANCREAS: No significant abnormality.   SPLEEN: No significant abnormality.   ADRENALS: No significant abnormality.   KIDNEYS, URETERS, BLADDER:  No nephroureterolithiasis or hydroureteronephrosis. A new Robles catheter in the bladder. There is new perivesical inflammatory fat stranding suspicious for cystitis.   REPRODUCTIVE ORGANS: The prostate gland is enlarged, measuring 5.5 cm in transverse dimension.   VESSELS: No significant abnormality.   RETROPERITONEUM/LYMPH NODES: No enlarged lymph nodes. No acute retroperitoneal abnormality.   BOWEL/MESENTERY/PERITONEUM: The stomach is severely dilated with air-fluid level and demonstrates gastroesophageal reflux into the distal esophagus extending proximally beyond the scan range.  The esophageal wall is diffusely thickened. There is new dilatation of the duodenum extending into the jejunum. There significantly degraded jejunal dilatation compared to prior study, measuring > 5 cm. There are air-fluid levels throughout the dilated jejunum. There again a relatively gradual transition point now located in the right lower quadrant which this loop demonstrates diffuse wall thickening and perienteric edema/vascular engorgement. The on this the small bowel is decompressed.   There is again diffuse rectal wall thickening, appearing similar to progressed from prior study which may be due to differences in distension or proctitis/stercoral colitis.     MUSCULOSKELETAL: No acute osseous abnormality. No suspicious osseous lesion.       1. Significant worsening of small bowel obstruction. Specifically, there is severe gastric dilatation and air-fluid level, large volume gastroesophageal reflux, new diffuse duodenal dilatation, and significantly greater severity and length of remaining small bowel dilatation with air-fluid levels, > 5 cm. Transition point in the right lower quadrant, with relatively gradual narrowing to decompressed caliber, in which again the bowel demonstrates diffuse wall thickening. Recommend surgical consultation and NG tube placement.   2. Aspiration pneumonia in the lingula secondary to large volume gastroesophageal reflux from high-grade bowel obstruction.   3. Diffuse rectal wall thickening, appearing similar to slightly greater compared to prior study. Correlate for proctitis/stercoral colitis. However, because this is new from 2023, recommend follow-up with gastroenterology for colonoscopy evaluation in order to exclude malignancy.   MACRO: Tommy Colon discussed the significance and urgency of this critical finding , which was acknowledged by the provider, via [employer] with  NADJA SAEED on 8/12/2024 at 1:45 am.  (**-RCF-**) Findings:  See findings.   Signed by: Tommy  Darlene 8/12/2024 1:45 AM Dictation workstation:   [id number]        IMPRESSION/RECOMMENDATIONS    Maximo Pablo is a 60 yo M with past medical history of cerebral palsy, and MRDD nonverbal, HTN, epilepsy, prior SBO who presents from group home for vomiting. He was found to have worsening of small bowel obstruction, severe gastric dilatation and air-fluid levels, large volume gastroesophageal reflux, new diffuse duodenal dilatation, transition point in right lower quadrant, aspiration pneumonia, and proctitis/stercoral colitis on CT imaging. NGT in place with 700 ml light brown output. Patient s/p fleet enema this morning with only small output. Suppository place this afternoon. SBO likely multifactorial - hx cerebral palsy, patient severely contracted and immobile, insufficient bowel regimen.     Recommendations:   -Continue supportive care  -General surgery may do SBFT within the next 1-2 days   -Keep NPO; Avoid PO bowel regimen agents while active SBO  -Continue with NGT    -Continue daily enema and suppository  -Add Linzess to bowel regimen at discharge.   -GI to continue to follow. Please reach out with any questions.     The patient will be seen and discussed with attending, Dr. Costello.     Geneva Doran DO  Family Medicine Resident, PGY-2    (Electronically signed byGeneva Doran DO on 8/12/2024 at 2:10 PM)

## 2024-08-12 NOTE — PROGRESS NOTES
Nutrition Initial Assessment:   Nutrition Assessment    Reason for Assessment: Provider consult order    Nutrition Note:  Maximo Pablo is a 59 y.o. male presenting 8/11 from group home with abd distention, constipation, and vomiting. Work up revealing +UA and r/o SBO. NGT placed in ED with reported 1L out; pt with 650mL out since 0700hrs 8/12.     Past Medical History  ED 8/8/2024 due to r/o SBO; pt with +BM in ED and sent back to group home.   has a past medical history of Epilepsy, unspecified, not intractable, without status epilepticus (Multi), Hyperlipidemia, unspecified, Personal history of other diseases of the circulatory system, Personal history of other diseases of the nervous system and sense organs, Personal history of other endocrine, nutritional and metabolic disease, and Personal history of other specified conditions.  Surgical History   has a past surgical history that includes Other surgical history (11/26/2019).       Nutrition History:  Energy Intake: Fair 50-75 %, Good > 75 %  Food and Nutrient History: Pt from group home ([facility] at [phone]). Per Aminah, pt on puree with HONEY thick liquids and fed by spoon with double portions for dinner. Pt usually fed self and ate well; if tired, staff then fed pt. Pt's meds were crushed and mixed in yogurt or pudding. NKFA. Note pt received Ensure 1x/day/MAR  Vitamin/Herbal Supplement Use: home meds include lactulose, MVI, D3, fleets enema, miralax  Food Allergies/Intolerances:  None  GI Symptoms: Constipation, Nausea, and distention  Oral Problems: Chewing difficulty, Swallowing difficulty, and puree with HONEY thick liquids PTA       Anthropometrics:      Weight: 72.6 kg (160 lb)   BMI (Calculated): 23.62  Admit weight 72.6kg         Critical-Care Pain Observation Score:  [0]      Weight History:   10/2023: 61.7kg  8/2023: 74kg  Per [facility], pt weight relatively stable at facility.  Weight Change %: no/group home staff       Nutrition Focused  "Physical Exam Findings:   limited exam 8/12  Subcutaneous Fat Loss:   Orbital Fat Pads: Well nourished (slightly bulging fat pads)  Buccal Fat Pads: Well nourished (full, rounded cheeks)  Muscle Wasting:  Temporalis: Well nourished (well-defined muscle)  Pectoralis (Clavicular Region): Well nourished (clavicle not visible)  Edema:  Edema: none  Physical Findings:  Nails: Negative  Skin: Negative (scattered bruising/ no edema)    Nutrition Significant Labs:  BMP Trend:   Results from last 7 days   Lab Units 08/12/24  0551 08/11/24  2336 08/09/24  1841   GLUCOSE mg/dL 107* 165* 120*   CALCIUM mg/dL 9.0 10.0 9.9   SODIUM mmol/L 140 133* 134*   POTASSIUM mmol/L 3.6 5.4* 4.3   CO2 mmol/L 31 31 30   CHLORIDE mmol/L 100 92* 97*   BUN mg/dL 22 22 11   CREATININE mg/dL 1.02 0.86 0.70    , A1C:  Lab Results   Component Value Date    HGBA1C 5.2 09/26/2023   , BG POCT trend:   Results from last 7 days   Lab Units 08/12/24  1150 08/12/24  0757   POCT GLUCOSE mg/dL 173* 147*    , Renal Lab Trend:   Results from last 7 days   Lab Units 08/12/24  0551 08/11/24 2336 08/09/24  1841   POTASSIUM mmol/L 3.6 5.4* 4.3   PHOSPHORUS mg/dL 3.8  --   --    SODIUM mmol/L 140 133* 134*   MAGNESIUM mg/dL 1.90 2.46* 2.19   EGFR mL/min/1.73m*2 86 >90 >90   BUN mg/dL 22 22 11   CREATININE mg/dL 1.02 0.86 0.70    , Vit B12: No results found for: \"[id number]\" , Folate: No results found for: \"FOLATE\" , CF Vitamin Labs:   Lab Results   Component Value Date    VITD25 54 09/26/2023        Nutrition Specific Medications:  Scheduled medications  [Held by provider] amLODIPine, 10 mg, oral, Daily  [Held by provider] aspirin, 81 mg, oral, Daily  chlorhexidine, 15 mL, Mouth/Throat, BID  [Held by provider] cloNIDine, 0.2 mg, oral, BID  enoxaparin, 40 mg, subcutaneous, q24h  [Held by provider] hydroCHLOROthiazide, 12.5 mg, oral, Daily  [Held by provider] labetalol, 100 mg, oral, BID  levETIRAcetam, 500 mg, intravenous, q12h  [Held by provider] lisinopril, 20 " mg, oral, BID  metoclopramide, 10 mg, intravenous, Once  OXcarbazepine, 450 mg, oral, BID  oxygen, , inhalation, Continuous - Inhalation  pantoprazole, 40 mg, intravenous, Daily before breakfast  piperacillin-tazobactam, 4.5 g, intravenous, q8h  sodium phosphates, 1 enema, rectal, Daily      Continuous medications  lactated Ringer's, 75 mL/hr, Last Rate: 75 mL/hr (08/12/24 1200)      PRN medications  PRN medications: bisacodyl, dextrose, dextrose, glucagon, glucagon, ondansetron     I/O:   Last BM Date: 08/12/24; Stool Appearance: Loose, Soft (08/12/24 0528)    Dietary Orders (From admission, onward)       Start     Ordered    08/12/24 0525  NPO Diet; Effective now  Diet effective now         08/12/24 0524                     Estimated Needs:   Total Energy Estimated Needs (kCal):  (2000-2300kcal (28-32kcal/kg of 72.6kg))     Total Protein Estimated Needs (g):  (87-109g (1.2-1.5g/kg of 72.6kg))     Total Fluid Estimated Needs (mL):  (1mL/kcal/d or as per physician)           Nutrition Diagnosis   Malnutrition Diagnosis  Patient has Malnutrition Diagnosis: No    Nutrition Diagnosis  Patient has Nutrition Diagnosis: Yes  Diagnosis Status (1): New  Nutrition Diagnosis 1: Inadequate oral intake  Related to (1): altered GI function  As Evidenced by (1): NPO with constipation and r/o SBO; NGT in place.  Additional Nutrition Diagnosis: Diagnosis 2  Diagnosis Status (2): New  Nutrition Diagnosis 2: Swallowing difficulity  Related to (2): baseline dysphagia  As Evidenced by (2): baseline need for puree level 4 with moderately thick (HONEY) liquids.  Additional Assessment Information (2): 8/12: Case discussed with nurse. NGT output remains high. PT with peripheral access only. Consult received for EN recommendations--which is currently not appropriate; hopeful as GI issues resolved, pt will be able to resume puree level 4 with moderately thick (HONEY thick) liquids.       Nutrition Interventions/Recommendations          Nutrition Prescription:  Individualized Nutrition Prescription Provided for : Healthful diet.        Nutrition Interventions:   Interventions: Meals and snacks, Medical food supplement  Meals and Snacks: Texture-modified diet, General healthful diet  Goal: Advance diet as appropriate to baseline diet of regular/puree level 4 with moderately thick (HONEY thick) liquids. If any overt swallow difficulties noted, consider ST evaluation prior to any PO attempts (pt with aspiration PNA on admit due to likely due to vomitus).  Medical Food Supplement: Commercial food  Goal: Magic Cup 2x/d and PRN for additional 580kcal and 18g pro (for 2) along with Gelatein Plus 2x/d and PRN for additional 320kcal and 40g pro (for 2).    Collaboration and Referral of Nutrition Care: Collaboration by nutrition professional with other providers  Goal: [facility]    Nutrition Education:   8/12: pt not appropriate; from group home and reportedly nonverbal       Nutrition Monitoring and Evaluation   Food/Nutrient Related History Monitoring  Monitoring and Evaluation Plan: Energy intake  Energy Intake: Estimated energy intake  Criteria: >75% of estimated nutrient needs via oral diet within next 48-72 hours.    Body Composition/Growth/Weight History  Monitoring and Evaluation Plan: Weight  Weight: Measured weight  Criteria: daily weight    Biochemical Data, Medical Tests and Procedures  Monitoring and Evaluation Plan: Electrolyte/renal panel  Electrolyte and Renal Panel: Magnesium, Phosphorus, Potassium, Sodium  Criteria: lytes WNR    Nutrition Focused Physical Findings  Monitoring and Evaluation Plan: Digestive System  Digestive System: Abdominal distension, Constipation, Vomiting  Criteria: GI symptoms will resolve within next 24-48 hours.         Time Spent (min): 60 minutes

## 2024-08-12 NOTE — ED PROVIDER NOTES
HPI   Chief Complaint   Patient presents with    Vomiting     Patient was here other day for constipation; from group home; here today for emesis x3;  patient is nonverbal        This is a 59 years old male patient with history of MRDD presenting to the emergency department with abdominal distention, constipation, and vomiting.  Caregiver denied any other complaint at this point.  History of present illness as well as review of system are limited secondary to the patient nonverbal status.              Patient History   Past Medical History:   Diagnosis Date    Epilepsy, unspecified, not intractable, without status epilepticus (Multi)     Epilepsy    Hyperlipidemia, unspecified     Dyslipidemia    Personal history of other diseases of the circulatory system     History of hypertension    Personal history of other diseases of the nervous system and sense organs     History of cerebral palsy    Personal history of other endocrine, nutritional and metabolic disease     History of vitamin D deficiency    Personal history of other specified conditions     History of dysphagia     Past Surgical History:   Procedure Laterality Date    OTHER SURGICAL HISTORY  11/26/2019    No history of surgery     No family history on file.  Social History     Tobacco Use    Smoking status: Not on file    Smokeless tobacco: Not on file   Substance Use Topics    Alcohol use: Not on file    Drug use: Not on file       Physical Exam   ED Triage Vitals   Temperature Heart Rate Respirations BP   08/11/24 2246 08/11/24 2246 08/11/24 2246 08/11/24 2246   37 °C (98.6 °F) (!) 119 20 (!) 180/128      Pulse Ox Temp Source Heart Rate Source Patient Position   08/11/24 2246 08/11/24 2246 -- --   (!) 93 % Temporal        BP Location FiO2 (%)     08/11/24 2246 08/12/24 0233     Right arm 100 %       Physical Exam  Vitals and nursing note reviewed.   Constitutional:       General: He is not in acute distress.     Appearance: He is well-developed. He is  ill-appearing.   HENT:      Head: Normocephalic and atraumatic.   Eyes:      Conjunctiva/sclera: Conjunctivae normal.   Cardiovascular:      Rate and Rhythm: Regular rhythm. Tachycardia present.      Heart sounds: No murmur heard.  Pulmonary:      Effort: Pulmonary effort is normal. No respiratory distress.      Breath sounds: Normal breath sounds.   Abdominal:      General: There is distension.      Palpations: Abdomen is soft.      Tenderness: There is no abdominal tenderness.   Musculoskeletal:         General: No swelling.      Cervical back: Neck supple.   Skin:     General: Skin is warm and dry.      Capillary Refill: Capillary refill takes less than 2 seconds.   Neurological:      Mental Status: He is alert.           ED Course & MDM   Diagnoses as of 08/12/24 0401   Intestinal obstruction, unspecified cause, unspecified whether partial or complete (Multi)                 No data recorded                                 Medical Decision Making  Patient seen and examined, given the tachycardia, tachypnea, leukocytosis sepsis workup is initiated, patient has leukocytosis, CT abdomen pelvis with a concern of worsening bowel obstruction, NG tube inserted, patient was hypoxic upon arrival to the emergency department and started to get increased oxygen requirement in the ED.  There is concern of aspiration.  Cover the patient with Zosyn.  Spoke to Dr. Yakov Easley recommendation is to admit the patient to medicine team and general surgery team is on board.  Urine with a concern of UTI but the patient was covered empirically with Zosyn.  Rest of the workup is unremarkable except for mildly elevated transaminases.Teaching service declined excepting the patient given that he is on nonrebreather.  Spoke to Dr. Garcia the intensivist and she requested high flow oxygen and admitting the patient to the ICU.ICU staff to follow-up on the KUB and oxygen requirement.        Procedure  Procedures     Oliver Cordova  DO  08/12/24 0402       Oliver Cordova, DO  08/12/24 0520

## 2024-08-12 NOTE — PROGRESS NOTES
Maximo Pablo is a 59 y.o. male on day 0 of admission presenting with Intestinal obstruction, unspecified cause, unspecified whether partial or complete (Multi).      Subjective   Patient is a 59-year-old male with past medical history of epilepsy, cerebral palsy on Trileptal and Keppra.  Patient recently admitted to ICU for treatment of small bowel obstruction.  Patient requires n.p.o. status at this time.  Primary team requesting consult regarding IV antiepileptic regimen alternate to patient's home dose of Trileptal.  At this time patient does have IV Keppra on board for home Keppra oral equivalent.  Patient takes 500 mg twice daily Keppra and 300 mg twice daily Trileptal at home for antiseizure medication.  Patient has had no focal or generalized seizure activity during this hospital course.       Objective     Last Recorded Vitals  Blood pressure 142/81, pulse (!) 124, temperature 37.2 °C (99 °F), temperature source Temporal, resp. rate 18, weight 72.6 kg (160 lb), SpO2 94%.    Physical Exam  Eyes:      Extraocular Movements: Extraocular movements intact.      Pupils: Pupils are equal, round, and reactive to light.   Cardiovascular:      Rate and Rhythm: Normal rate and regular rhythm.   Pulmonary:      Effort: Pulmonary effort is normal.   Abdominal:      General: Abdomen is flat.   Musculoskeletal:      Comments: Bilateral upper extremity contracture including wrist and finger flexion bilaterally.  Left-sided lateral deviation of the neck with tension.  Flexion of all digits of bilateral feet.  Decreased reflexes noted.   Neurological:      General: No focal deficit present.      Mental Status: Mental status is at baseline.       Neurological Exam  Mental Status  Arousable to verbal stimuli. Patient is nonverbal.    Cranial Nerves  CN III, IV, VI: Extraocular movements intact bilaterally. Pupils equal round and reactive to light bilaterally.    Motor  Decreased muscle bulk throughout. No fasciculations  present. Increased muscle tone. No abnormal involuntary movements.    Relevant Results                    [place] Coma Scale  Best Eye Response: Spontaneous  Best Verbal Response: None  Best Motor Response: Withdraws to pain  [place] Coma Scale Score: 9                             Assessment/Plan      Principal Problem:    Intestinal obstruction, unspecified cause, unspecified whether partial or complete (Multi)  Active Problems:    Acute hypoxic respiratory failure (Multi)    Aspiration pneumonia of both lungs due to gastric secretions (Multi)    Hyponatremia    Transaminitis    Leukocytosis    Sepsis with acute hypoxic respiratory failure (Multi)    Constipation    #Cerebral palsy  - Recommend continuing IV at home dose of Keppra at home dose  - Starting Vimpat 100 mg twice daily IV in lieu of home Trileptal at this time, while patient is n.p.o.  - Remaining management per primary team             Lida Thomas MD    I saw and evaluated the patient.  I obtained the key portions of the history and examination.  I reviewed the residents/APNs note, discussed the patient and supervised treatment plan formulation.    History of Seizure  Cerebral Palsy  NPO due to SBO    Plan:  - continue Keppra 500 mg BID  - replace Trileptal with Vimpat 100 mg IV BID    Turner Vitale MD  [facility]  Department of Neurology

## 2024-08-12 NOTE — CONSULTS
"Inpatient consult to Acute Care Surgery  Consult performed by: GERTRUDIS Couch-CNP  Consult ordered by: VIANEY Dela Cruz  Reason for consult: SBO      History Of Present Illness  Maximo Pablo is a 59 y.o. male with PMHx of cerebral palsy, MRDD, nonverbal at baseline, epilepsy, HTN, SBO that presented to the emergency department from his group home for abdominal distention, constipation and vomiting.  Patient is nonverbal at baseline and the caregiver is not at bedside.  Information obtained from EMR records.  Patient was recently in the ED on 8/9/2024 for \"less energy.\"  Per EMR, during that visit it was noted that patient also had signs of abdominal distress and patient does have a history of bowel obstructions.  The CT abdomen and pelvis during that time reported dilated jejunal loops measuring up to 4cm air-fluid levels, relatively gradual transition point to decompressed caliber in mid abdomen where the fecalization of bowel content likely secondary to statis/hypomotility.  Also moderate to large colonic stool burden again greatest in rectum.  Patient was discharged with GI follow up.     At time of consult, vital signs reported Tmax 36.5, tachycardiac , resp 30, /76, Sp02 100% on high flow nasal cannula.  Labs reported no leukocytosis, WBC 4.4, H&H stable, glucose 107, albumin 3.1, rest of labs unremarkable.  CT abdomen and pelvis reported significant worsening of small bowel obstruction. Specifically, there is severe gastric dilatation and air-fluid level, large volume gastroesophageal reflux, new diffuse duodenal dilatation, and significantly greater severity and length of remaining small bowel dilatation with air-fluid levels, > 5 cm. Transition point in the right lower quadrant, with relatively gradual narrowing to decompressed caliber, in which again the bowel demonstrates diffuse wall thickening. A NG tube was placed and immediately 700cc drainage out per nursing.     Of " note: patient admitted to [facility] 8/10/2023 for large stool burden.  He was found to a have a large fecal ball in the left rectosigmoid colon with fecal impaction.  NGT was placed with minimal output.  No surgical intervention at that time, recommended to be on a good bowel regimen from below with manual disimpaction and enemas with GI being on consult for assistance with disimpaction.     Past Medical History  Past Medical History:   Diagnosis Date    Epilepsy, unspecified, not intractable, without status epilepticus (Multi)     Epilepsy    Hyperlipidemia, unspecified     Dyslipidemia    Personal history of other diseases of the circulatory system     History of hypertension    Personal history of other diseases of the nervous system and sense organs     History of cerebral palsy    Personal history of other endocrine, nutritional and metabolic disease     History of vitamin D deficiency    Personal history of other specified conditions     History of dysphagia        Surgical History  Past Surgical History:   Procedure Laterality Date    OTHER SURGICAL HISTORY  11/26/2019    No history of surgery         Social History  Social Determinants of Health     Tobacco Use: Unknown (4/30/2024)    Received from [facility]    Patient History     Smoking Tobacco Use: Never     Smokeless Tobacco Use: Unknown     Passive Exposure: Not on file   Recent Concern: Tobacco Use - Medium Risk (2/7/2024)    Received from [facility]    Patient History     Smoking Tobacco Use: Never     Smokeless Tobacco Use: Never     Passive Exposure: Yes   Alcohol Use: Patient Unable To Answer (8/12/2024)    AUDIT-C     Frequency of Alcohol Consumption: Patient unable to answer     Average Number of Drinks: Patient unable to answer     Frequency of Binge Drinking: Patient unable to answer   Financial Resource Strain: Patient Unable To Answer (8/12/2024)    Overall Financial Resource Strain (CARDIA)     Difficulty of Paying Living Expenses:  Patient unable to answer   Food Insecurity: Not on file   Transportation Needs: Patient Unable To Answer (8/12/2024)    PRAPARE - Transportation     Lack of Transportation (Medical): Patient unable to answer     Lack of Transportation (Non-Medical): Patient unable to answer   Physical Activity: Not on file   Stress: Not on file   Social Connections: Not on file   Intimate Partner Violence: Not on file   Depression: Not at risk (8/12/2024)    PHQ-2     PHQ-2 Score: 0   Housing Stability: Patient Unable To Answer (8/12/2024)    Housing Stability Vital Sign     Unable to Pay for Housing in the Last Year: Patient unable to answer     Number of Times Moved in the Last Year: 1     Homeless in the Last Year: Patient unable to answer   Utilities: Not on file   Digital Equity: Not on file   Health Literacy: Not on file        Family History  No family history on file.     Home Medications  Prior to Admission medications    Medication Sig Start Date End Date Taking? Authorizing Provider   amLODIPine (Norvasc) 10 mg tablet Take 1 tablet (10 mg) by mouth twice a day.    Historical Provider, MD   aspirin 81 mg chewable tablet Chew 1 tablet (81 mg) once daily.    Historical Provider, MD   chlorhexidine (Peridex) 0.12 % solution Take 15 mL by mouth twice a day. 7/30/15   Historical Provider, MD   cholecalciferol (Vitamin D-3) 25 MCG (1000 UT) tablet Take 1 tablet (25 mcg) by mouth once daily.    Historical Provider, MD   cloNIDine (Catapres) 0.2 mg tablet  2/14/24   Historical Provider, MD   dupilumab (Dupixent) 300 mg/2 mL pen injector INJECT 300MG (1 PEN) UNDER THE SKIN EVERY OTHER WEEK. 3/20/24 3/20/25  Keturah Nathan DO   hydroCHLOROthiazide (HYDRODiuril) 12.5 mg tablet  2/14/24   Historical Provider, MD   labetalol (Normodyne) 100 mg tablet  2/14/24   Historical Provider, MD   levETIRAcetam (Keppra) 500 mg tablet Take 1 tablet (500 mg) by mouth twice a day. 5/31/16   Historical Provider, MD   lisinopril 20 mg tablet Take 1  tablet (20 mg) by mouth once daily. 8/15/22   Historical Provider, MD   OXcarbazepine (Trileptal) 150 mg tablet Take 1 tablet (150 mg) by mouth 2 times a day. 5/31/16   Historical Provider, MD   OXcarbazepine (Trileptal) 300 mg tablet Take 1 tablet (300 mg) by mouth 2 times a day. 5/31/16   Historical Provider, MD   polyethylene glycol (Glycolax, Miralax) 17 gram/dose powder Take 17 g by mouth once daily. 6/8/15   Historical Provider, MD   potassium chloride ER (Micro-K) 10 mEq ER capsule Take by mouth.    Historical Provider, MD   Senexon-S 8.6-50 mg tablet  2/14/24   Historical Provider, MD   Thera-M 9 mg iron-400 mcg tablet  1/15/24   Historical Provider, MD        Allergies  Patient has no known allergies.    Review of Systems  Review of Systems   Unable to perform ROS: Patient nonverbal     Physical Exam  Physical Exam  Vitals reviewed.   Constitutional:       General: He is awake.      Appearance: Normal appearance.      Comments: Nonverbal, open eyes spontaneously   Cardiovascular:      Rate and Rhythm: Normal rate and regular rhythm.      Pulses: Normal pulses.      Heart sounds: Normal heart sounds.   Pulmonary:      Effort: Pulmonary effort is normal.      Breath sounds: Normal air entry.      Comments: +rhonci on right side, left lung fields clear  Abdominal:      General: Abdomen is flat. There is no distension.      Palpations: Abdomen is soft.      Tenderness: There is no abdominal tenderness. There is no guarding or rebound.      Comments: Soft, non-tender to palpation, no facial grimacing.  NGT with bilious output.    Musculoskeletal:         General: Normal range of motion.      Cervical back: Normal range of motion.      Comments: Contracted LUE   Skin:     General: Skin is warm and dry.   Neurological:      General: No focal deficit present.      Mental Status: Mental status is at baseline.   Psychiatric:         Behavior: Behavior is cooperative.          Medications  Scheduled  medications  enoxaparin, 40 mg, subcutaneous, q24h  oxygen, , inhalation, Continuous - Inhalation  pantoprazole, 40 mg, intravenous, Daily before breakfast  piperacillin-tazobactam, 4.5 g, intravenous, q8h  potassium chloride, 20 mEq, intravenous, Once  sodium phosphates, 1 enema, rectal, Daily      Continuous medications  lactated Ringer's, 75 mL/hr, Last Rate: 75 mL/hr (08/12/24 0700)      PRN medications  PRN medications: dextrose, dextrose, glucagon, glucagon, ondansetron     Last Recorded Vitals  Heart Rate:  []   Temp:  [36.6 °C (97.9 °F)-37 °C (98.6 °F)]   Resp:  [17-45]   BP: (126-180)/()   Weight:  [72.6 kg (160 lb)-77.1 kg (170 lb)]   SpO2:  [89 %-100 %]      Input/Output    Intake/Output Summary (Last 24 hours) at 8/12/2024 0817  Last data filed at 8/12/2024 0752  Gross per 24 hour   Intake 2730.25 ml   Output 835 ml   Net 1895.25 ml        Labs  Results for orders placed or performed during the hospital encounter of 08/11/24 (from the past 24 hour(s))   CBC and Auto Differential   Result Value Ref Range    WBC 12.0 (H) 4.4 - 11.3 x10*3/uL    nRBC 0.0 0.0 - 0.0 /100 WBCs    RBC 6.29 (H) 4.50 - 5.90 x10*6/uL    Hemoglobin 16.9 13.5 - 17.5 g/dL    Hematocrit 50.6 41.0 - 52.0 %    MCV 80 80 - 100 fL    MCH 26.9 26.0 - 34.0 pg    MCHC 33.4 32.0 - 36.0 g/dL    RDW 14.9 (H) 11.5 - 14.5 %    Platelets 258 150 - 450 x10*3/uL    Neutrophils % 74.8 40.0 - 80.0 %    Immature Granulocytes %, Automated 1.0 (H) 0.0 - 0.9 %    Lymphocytes % 8.6 13.0 - 44.0 %    Monocytes % 14.0 2.0 - 10.0 %    Eosinophils % 1.3 0.0 - 6.0 %    Basophils % 0.3 0.0 - 2.0 %    Neutrophils Absolute 8.95 (H) 1.20 - 7.70 x10*3/uL    Immature Granulocytes Absolute, Automated 0.12 0.00 - 0.70 x10*3/uL    Lymphocytes Absolute 1.03 (L) 1.20 - 4.80 x10*3/uL    Monocytes Absolute 1.67 (H) 0.10 - 1.00 x10*3/uL    Eosinophils Absolute 0.16 0.00 - 0.70 x10*3/uL    Basophils Absolute 0.03 0.00 - 0.10 x10*3/uL   Comprehensive metabolic panel    Result Value Ref Range    Glucose 165 (H) 74 - 99 mg/dL    Sodium 133 (L) 136 - 145 mmol/L    Potassium 5.4 (H) 3.5 - 5.3 mmol/L    Chloride 92 (L) 98 - 107 mmol/L    Bicarbonate 31 21 - 32 mmol/L    Anion Gap 15 10 - 20 mmol/L    Urea Nitrogen 22 6 - 23 mg/dL    Creatinine 0.86 0.50 - 1.30 mg/dL    eGFR >90 >60 mL/min/1.73m*2    Calcium 10.0 8.6 - 10.3 mg/dL    Albumin 3.9 3.4 - 5.0 g/dL    Alkaline Phosphatase 255 (H) 33 - 120 U/L    Total Protein 9.0 (H) 6.4 - 8.2 g/dL    AST 66 (H) 9 - 39 U/L    Bilirubin, Total 0.4 0.0 - 1.2 mg/dL    ALT 61 (H) 10 - 52 U/L   Magnesium   Result Value Ref Range    Magnesium 2.46 (H) 1.60 - 2.40 mg/dL   Lactate   Result Value Ref Range    Lactate 1.6 0.4 - 2.0 mmol/L   Morphology   Result Value Ref Range    RBC Morphology See Below     Giant Platelets Few    Urinalysis with Reflex Culture and Microscopic   Result Value Ref Range    Color, Urine Dark-Brown (N) Light-Yellow, Yellow, Dark-Yellow    Appearance, Urine Ex.Turbid (N) Clear    Specific Gravity, Urine 1.039 (N) 1.005 - 1.035    pH, Urine 6.0 5.0, 5.5, 6.0, 6.5, 7.0, 7.5, 8.0    Protein, Urine 100 (2+) (A) NEGATIVE, 10 (TRACE), 20 (TRACE) mg/dL    Glucose, Urine Normal Normal mg/dL    Blood, Urine OVER (3+) (A) NEGATIVE    Ketones, Urine 10 (1+) (A) NEGATIVE mg/dL    Bilirubin, Urine NEGATIVE NEGATIVE    Urobilinogen, Urine Normal Normal mg/dL    Nitrite, Urine NEGATIVE NEGATIVE    Leukocyte Esterase, Urine 500 Chaz/µL (A) NEGATIVE   Microscopic Only, Urine   Result Value Ref Range    WBC, Urine >50 (A) 1-5, NONE /HPF    RBC, Urine >20 (A) NONE, 1-2, 3-5 /HPF   Comprehensive Metabolic Panel   Result Value Ref Range    Glucose 107 (H) 74 - 99 mg/dL    Sodium 140 136 - 145 mmol/L    Potassium 3.6 3.5 - 5.3 mmol/L    Chloride 100 98 - 107 mmol/L    Bicarbonate 31 21 - 32 mmol/L    Anion Gap 13 10 - 20 mmol/L    Urea Nitrogen 22 6 - 23 mg/dL    Creatinine 1.02 0.50 - 1.30 mg/dL    eGFR 86 >60 mL/min/1.73m*2    Calcium 9.0 8.6  - 10.3 mg/dL    Albumin 3.1 (L) 3.4 - 5.0 g/dL    Alkaline Phosphatase 195 (H) 33 - 120 U/L    Total Protein 7.2 6.4 - 8.2 g/dL    AST 35 9 - 39 U/L    Bilirubin, Total 0.7 0.0 - 1.2 mg/dL    ALT 45 10 - 52 U/L   Magnesium   Result Value Ref Range    Magnesium 1.90 1.60 - 2.40 mg/dL   CBC   Result Value Ref Range    WBC 4.4 4.4 - 11.3 x10*3/uL    nRBC 0.0 0.0 - 0.0 /100 WBCs    RBC 6.06 (H) 4.50 - 5.90 x10*6/uL    Hemoglobin 16.1 13.5 - 17.5 g/dL    Hematocrit 49.7 41.0 - 52.0 %    MCV 82 80 - 100 fL    MCH 26.6 26.0 - 34.0 pg    MCHC 32.4 32.0 - 36.0 g/dL    RDW 14.6 (H) 11.5 - 14.5 %    Platelets 209 150 - 450 x10*3/uL   Phosphorus   Result Value Ref Range    Phosphorus 3.8 2.5 - 4.9 mg/dL   POCT GLUCOSE   Result Value Ref Range    POCT Glucose 147 (H) 74 - 99 mg/dL       Imaging  XR abdomen 1 view    Result Date: 8/12/2024  Interpreted By:  Tommy Colon, STUDY: XR ABDOMEN 1 VIEW;  8/12/2024 5:45 am   INDICATION: Signs/Symptoms:Ngt replaced.   COMPARISON: 08/12/2024   ACCESSION NUMBER(S): [id number]   ORDERING CLINICIAN: OMAR HERRERA   FINDINGS: NG/OG tube terminates in the gastric body. There is persistent dilatation of small bowel loops up to 6 cm in the central abdomen.   No evidence of free intraperitoneal air.   No pneumatosis or portal venous gas identified.   No acute osseous abnormalities.   Left basilar consolidation representing known lingular airspace disease on CT abdomen/pelvis.       NG/OG tube terminates in the gastric body. There is persistent dilatation of small bowel loops up to 6 cm in the central abdomen.   Redemonstration of lingular pneumonia/aspiration pneumonitis.   MACRO: None.   Signed by: Tommy Colon 8/12/2024 5:47 AM Dictation workstation:   [id number]    XR abdomen 1 view    Result Date: 8/12/2024  Interpreted By:  Melissa Adkins, STUDY: XR ABDOMEN 1 VIEW;  8/12/2024 3:43 am   INDICATION: Signs/Symptoms:POST NG TUBE ASSESMENT. PLEASE CALL MICAH Bradford, to confirm it is  inserted.   COMPARISON: Abdominal x-rays 08/09/2024 CT scan of the abdomen pelvis 08/12/2020   ACCESSION NUMBER(S): [id number]   ORDERING CLINICIAN: NADJA SAEED   FINDINGS: Study is limited secondary to patient positioning. Enteric tube is seen to the level of the lower thoracic esophagus with side hole above the GE junction. This needs to be advanced slightly.   The right lateral abdomen and pelvis are excluded from the field of view limiting evaluation. Gaseous distended dilated loops of small bowel are noted in the upper abdomen measuring up to 5.5 cm consistent with small bowel obstruction as noted on CT. Limited evaluation of pneumoperitoneum on supine imaging. No pneumatosis or portal venous gas.  No abnormal calcifications.   Left basilar airspace opacities again noted.   Osseous structures demonstrate no acute bony changes.       1. Enteric tube is noted in the distal esophagus with side hole above the GE junction. Consider advancement and repeat imaging. 2. Multiple dilated loops of small bowel are again visualized consistent with small bowel obstruction as noted on CT. Please refer to separate report of CT for additional detail.   MACRO: None   Signed by: Melissa Adkins 8/12/2024 4:01 AM Dictation workstation:   [id number]    XR chest 1 view    Result Date: 8/12/2024  Interpreted By:  Finkelstein, Evan, STUDY: XR CHEST 1 VIEW;  8/12/2024 2:14 am   INDICATION: Signs/Symptoms:s/p ng tube placement, concern for aspiration.   COMPARISON: Chest radiograph 08/09/2024. CT abdomen pelvis 08/12/2024   ACCESSION NUMBER(S): [id number]   ORDERING CLINICIAN: NADJA SAEED   FINDINGS: NG tube courses just below the diaphragm terminating at midline in the upper abdomen. Side hole at the level of the mid esophagus.   CARDIOMEDIASTINAL SILHOUETTE: Cardiomediastinal silhouette is normal in size and configuration.   LUNGS: Low lung volumes with bronchovascular crowding. Bibasilar opacities. No pleural effusion or  pneumothorax.   ABDOMEN: Prominent lucencies underlying the diaphragms bilaterally, likely corresponding to air in the stomach and colon anterior to the liver as seen on earlier same day CT abdomen pelvis.   BONES: No acute osseous abnormality.       NG tube courses just below the diaphragm terminating midline likely near the GE junction. Side hole overlying the mid esophagus. Recommend advancement. Low lung volumes with bronchovascular crowding. Bibasilar opacities may represent atelectasis with aspiration/pneumonia not excluded in the appropriate clinical setting.   MACRO: None.   Signed by: Evan Finkelstein 8/12/2024 3:03 AM Dictation workstation:   [id number]    CT abdomen pelvis wo IV contrast    Result Date: 8/12/2024  Interpreted By:  Tommy Colon, STUDY: CT ABDOMEN PELVIS WO IV CONTRAST;  8/12/2024 12:56 am   INDICATION: Signs/Symptoms:abd pain.   COMPARISON: 08/09/2024 CT abdomen/pelvis and 08/09/2022 CT abdomen/pelvis   ACCESSION NUMBER(S): [id number]   ORDERING CLINICIAN: NADJA SAEED   TECHNIQUE: Contiguous axial images of the abdomen and pelvis were obtained without intravenous contrast. Coronal and sagittal reformatted images were reconstructed from the axial data.   FINDINGS: Note: The absence of intravenous contrast limits evaluation of the solid organs and vasculature.   LOWER CHEST: There new consolidation in the lingula. There are patchy atelectatic and ground-glass opacities in the lower lobes.     ABDOMEN/PELVIS:   ABDOMINAL WALL: There is a small fat containing umbilical hernia and laxity of the linea alba.   LIVER: The liver demonstrates a normal noncontrast attenuation.   BILE DUCTS: No significant intrahepatic or extrahepatic dilatation.   GALLBLADDER: No significant abnormality.   PANCREAS: No significant abnormality.   SPLEEN: No significant abnormality.   ADRENALS: No significant abnormality.   KIDNEYS, URETERS, BLADDER:  No nephroureterolithiasis or hydroureteronephrosis. A  new Robles catheter in the bladder. There is new perivesical inflammatory fat stranding suspicious for cystitis.   REPRODUCTIVE ORGANS: The prostate gland is enlarged, measuring 5.5 cm in transverse dimension.   VESSELS: No significant abnormality.   RETROPERITONEUM/LYMPH NODES: No enlarged lymph nodes. No acute retroperitoneal abnormality.   BOWEL/MESENTERY/PERITONEUM: The stomach is severely dilated with air-fluid level and demonstrates gastroesophageal reflux into the distal esophagus extending proximally beyond the scan range. The esophageal wall is diffusely thickened. There is new dilatation of the duodenum extending into the jejunum. There significantly degraded jejunal dilatation compared to prior study, measuring > 5 cm. There are air-fluid levels throughout the dilated jejunum. There again a relatively gradual transition point now located in the right lower quadrant which this loop demonstrates diffuse wall thickening and perienteric edema/vascular engorgement. The on this the small bowel is decompressed.   There is again diffuse rectal wall thickening, appearing similar to progressed from prior study which may be due to differences in distension or proctitis/stercoral colitis.     MUSCULOSKELETAL: No acute osseous abnormality. No suspicious osseous lesion.       1. Significant worsening of small bowel obstruction. Specifically, there is severe gastric dilatation and air-fluid level, large volume gastroesophageal reflux, new diffuse duodenal dilatation, and significantly greater severity and length of remaining small bowel dilatation with air-fluid levels, > 5 cm. Transition point in the right lower quadrant, with relatively gradual narrowing to decompressed caliber, in which again the bowel demonstrates diffuse wall thickening. Recommend surgical consultation and NG tube placement.   2. Aspiration pneumonia in the lingula secondary to large volume gastroesophageal reflux from high-grade bowel obstruction.    3. Diffuse rectal wall thickening, appearing similar to slightly greater compared to prior study. Correlate for proctitis/stercoral colitis. However, because this is new from 2023, recommend follow-up with gastroenterology for colonoscopy evaluation in order to exclude malignancy.   MACRO: Tommy Colon discussed the significance and urgency of this critical finding , which was acknowledged by the provider, via [employer] with  NADJA SAEED on 8/12/2024 at 1:45 am.  (**-RCF-**) Findings:  See findings.   Signed by: Tommy Colon 8/12/2024 1:45 AM Dictation workstation:   [id number]    CT abdomen pelvis wo IV contrast    Result Date: 8/9/2024  Interpreted By:  Tommy Colon, STUDY: CT ABDOMEN PELVIS WO IV CONTRAST;  8/9/2024 8:30 pm   INDICATION: Signs/Symptoms:abnormal xr, rule out obstruction.   COMPARISON: 08/09/2023 CT abdomen/pelvis   ACCESSION NUMBER(S): [id number]   ORDERING CLINICIAN: WILBERT FUNES   TECHNIQUE: Contiguous axial images of the abdomen and pelvis were obtained without intravenous contrast. Coronal and sagittal reformatted images were reconstructed from the axial data.   FINDINGS: Note: The absence of intravenous contrast limits evaluation of the solid organs and vasculature.   LOWER CHEST: No acute abnormality.     ABDOMEN/PELVIS:   ABDOMINAL WALL: There is a small fat containing umbilical hernia and laxity of the linea alba.   LIVER: The liver demonstrates a normal noncontrast attenuation.   BILE DUCTS: No significant intrahepatic or extrahepatic dilatation.   GALLBLADDER: No significant abnormality.   PANCREAS: No significant abnormality.   SPLEEN: No significant abnormality.   ADRENALS: No significant abnormality.   KIDNEYS, URETERS, BLADDER:  No nephroureterolithiasis or hydroureteronephrosis. The bladder wall is normal thickness for degree of distention.   REPRODUCTIVE ORGANS: No significant abnormality.   VESSELS: No significant abnormality.   RETROPERITONEUM/LYMPH  "NODES: No enlarged lymph nodes. No acute retroperitoneal abnormality.   BOWEL/MESENTERY/PERITONEUM: Stomach and duodenum appear normal. There is dilatation of jejunal loops with air-fluid levels, measuring up to 4 cm. There is gradual tapering of jejunal caliber to normal in the mid abdomen; however, dislocation there is increasing fecalization of the bowel content. The remainder of the small bowel is decompressed. There is a moderate to large diffuse colonic stool burden the less pronounced compared to prior study. The rectum is distended up to 6 cm. There is diffuse rectal wall thickening likely due to chronic stercoral colitis.   No ascites, free air, or fluid collection.     MUSCULOSKELETAL: No acute osseous abnormality. No suspicious osseous lesion.       Dilated jejunal loops measuring up to 4 cm air-fluid levels; however, there is relatively gradual transition point to decompressed caliber in the mid abdomen where there is fecalization of the bowel content likely relating to stasis/hypomotility. At this location there is a diffuse wall thickening of the jejunum, nonspecific enteropathy. Therefore, findings could be at most related to a mild/partial or early bowel obstruction.   Diffuse moderate to large colonic stool burden again greatest in the rectum which is distended up to 5.9 cm. There is a rectal wall thickening diffusely with some perirectal stranding likely representing active chronic stercoral colitis given the propensity to have massive fecal impaction (08/09/2023). Recommend follow-up to address causes of bowel hypomotility.     MACRO: None.   Signed by: Tommy Colon 8/9/2024 9:19 PM Dictation workstation:   [id number]    XR abdomen 1 view    Result Date: 8/9/2024  Interpreted By:  Luigi Benites, STUDY: XR CHEST 1 VIEW; XR ABDOMEN 1 VIEW   INDICATION: Signs/Symptoms:Patient is from a group home reported to be \"not himself\" less energy; Signs/Symptoms:Patient reported to have less energy, " "history of constipation.   COMPARISON: October 14, 2023   ACCESSION NUMBER(S): [id number]; [id number]   ORDERING CLINICIAN: SHERLEY WEBSTER   FINDINGS: Low lung volumes with some minimal basilar atelectasis similar to the prior study. No consolidation, effusion, edema, or pneumothorax.   Numerous dilated bowel loops are again noted but are grossly similar to a previous abdominal exam.   Limited evaluation for air-fluid levels or free air on the supine radiographs.       Multiple dilated small bowel loops. While these are grossly similar to a previous examination of August 10, 2023, the possibility of a component of bowel obstruction is not excluded. Correlation with the patient's clinical symptomatology and presentation can determine the need for CT of the abdomen and pelvis for further evaluation.   No evidence of acute intrathoracic abnormality.   MACRO: Critical Finding:  See findings. Notification was initiated on 8/9/2024 at 5:41 pm by  Luigi Benites.  (**-YCF-**) Instructions:   Signed by: Luigi Benites 8/9/2024 5:41 PM Dictation workstation:   [id number]    XR chest 1 view    Result Date: 8/9/2024  Interpreted By:  Luigi Benites, STUDY: XR CHEST 1 VIEW; XR ABDOMEN 1 VIEW   INDICATION: Signs/Symptoms:Patient is from a group home reported to be \"not himself\" less energy; Signs/Symptoms:Patient reported to have less energy, history of constipation.   COMPARISON: October 14, 2023   ACCESSION NUMBER(S): [id number]; [id number]   ORDERING CLINICIAN: SHERLEY WEBSTER   FINDINGS: Low lung volumes with some minimal basilar atelectasis similar to the prior study. No consolidation, effusion, edema, or pneumothorax.   Numerous dilated bowel loops are again noted but are grossly similar to a previous abdominal exam.   Limited evaluation for air-fluid levels or free air on the supine radiographs.       Multiple dilated small bowel loops. While these are grossly similar to a previous examination of August 10, 2023, the " possibility of a component of bowel obstruction is not excluded. Correlation with the patient's clinical symptomatology and presentation can determine the need for CT of the abdomen and pelvis for further evaluation.   No evidence of acute intrathoracic abnormality.   MACRO: Critical Finding:  See findings. Notification was initiated on 8/9/2024 at 5:41 pm by  Luigi Benites.  (**-YCF-**) Instructions:   Signed by: Luigi Benites 8/9/2024 5:41 PM Dictation workstation:   [id number]         Plan  Intestinal obstruction, unspecified cause, unspecified whether partial or complete (Multi)    - Conservative management   - NPO  - Maintain NGT to LIWS  - CT A/P results as above. severe gastric dilatation and air-fluid level, large volume gastroesophageal reflux, new diffuse duodenal dilatation.  Transition point RLQ.   - Recommend possible SBFT within the next 1-2 days  - Recommend GI consult given history of fecal impaction   - No acute surgical intervention planned at this time.     Plan of care discussed and patient seen with Dr. Kaushal Parra, APRN-CNP    I spent 60 minutes in the professional and overall care of this patient.

## 2024-08-12 NOTE — H&P
History Of Present Illness  Maximo Pablo is a 59 y.o. male with PMH of cerebral palsy, MRDD nonverbal at baseline, HTN, epilepsy, and SBO from group home who presented to [facility] ED for vomiting. Of note, he was seen 8/9 for decreased energy and constipation. CT at that time showed mild/partial or early bowel obstruction, diffuse moderate to large stool burden, and stercoral colitis. He was discharged with GI follow up. Patient is non-verbal at baseline and no caregiver at bedside so history was obtained from chart review and ED provider.    ED course: Initial vitals- T 98.6F, , RR 20, /128, SPO2 93% on NRB. Labs significant for mild hyponatremia 133, hyperkalemia 5.4 but hemolyzed, transaminitis AlkPhos 255, ALT 61, AST 66, hypermagnesemia 2.46, leukocytosis 12, and UA +protein, blood, ketones, leuks, >50 WBCs. Urine and blood cultures sent.  CT abd/pelvis showed significant worsening of small bowel obstruction, severe gastric dilatation and air-fluid levels, large volume gastroesophageal reflux, new diffuse duodenal dilatation, transition point in right lower quadrant, aspiration pneumonia, and proctitis/stercoral colitis. CXR showed low lung volumes with bronchovascular crowding, bibasilar opacities. KUB showed NGT in esophagus, needs adjusted. He was given 1.5L NS, fentanyl, lorazepam, Zofran x2, and Zosyn. He was transitioned from NRB to HFNC. Surgery, Dr. Easley was consulted and recommended admission. He is admitted to ICU for close monitoring.      Past Medical History  Past Medical History:   Diagnosis Date    Epilepsy, unspecified, not intractable, without status epilepticus (Multi)     Epilepsy    Hyperlipidemia, unspecified     Dyslipidemia    Personal history of other diseases of the circulatory system     History of hypertension    Personal history of other diseases of the nervous system and sense organs     History of cerebral palsy    Personal history of other endocrine, nutritional and  metabolic disease     History of vitamin D deficiency    Personal history of other specified conditions     History of dysphagia       Surgical History  Past Surgical History:   Procedure Laterality Date    OTHER SURGICAL HISTORY  11/26/2019    No history of surgery        Social History  He has no history on file for tobacco use, alcohol use, and drug use.    Family History  No family history on file.     Allergies  Patient has no known allergies.    Review of Systems   Unable to perform ROS: Patient nonverbal        Physical Exam  Constitutional:       Appearance: He is ill-appearing.   HENT:      Head: Normocephalic.      Mouth/Throat:      Mouth: Mucous membranes are moist.   Eyes:      Extraocular Movements: Extraocular movements intact.      Conjunctiva/sclera: Conjunctivae normal.   Cardiovascular:      Rate and Rhythm: Regular rhythm. Tachycardia present.      Heart sounds: Normal heart sounds.   Pulmonary:      Effort: Pulmonary effort is normal. No respiratory distress.      Comments: Diminished right side, rhonchi RLL, clear left side  Abdominal:      General: There is distension.      Palpations: Abdomen is soft.      Comments: Hypoactive bowel sounds, brown emesis from NGT, brown soft stool on buttocks   Musculoskeletal:      Cervical back: Neck supple.      Right lower leg: No edema.      Left lower leg: No edema.   Skin:     General: Skin is warm and dry.   Neurological:      Mental Status: He is alert. Mental status is at baseline.      Comments: Contracted LUE, nonverbal, opens eyes spontaneously          Last Recorded Vitals  Blood pressure 135/75, pulse (!) 128, temperature 36.6 °C (97.9 °F), temperature source Temporal, resp. rate 18, weight 77.1 kg (170 lb), SpO2 98%.    Relevant Results  Scheduled medications  enoxaparin, 40 mg, subcutaneous, q24h  lactated Ringer's, 1,000 mL, intravenous, Once  oxygen, , inhalation, Continuous - Inhalation  pantoprazole, 40 mg, intravenous, Daily before  breakfast  piperacillin-tazobactam, 4.5 g, intravenous, q8h  sodium phosphates, 1 enema, rectal, Daily      Continuous medications  lactated Ringer's, 75 mL/hr      PRN medications  PRN medications: dextrose, dextrose, glucagon, glucagon, ondansetron  Results for orders placed or performed during the hospital encounter of 08/11/24 (from the past 24 hour(s))   CBC and Auto Differential   Result Value Ref Range    WBC 12.0 (H) 4.4 - 11.3 x10*3/uL    nRBC 0.0 0.0 - 0.0 /100 WBCs    RBC 6.29 (H) 4.50 - 5.90 x10*6/uL    Hemoglobin 16.9 13.5 - 17.5 g/dL    Hematocrit 50.6 41.0 - 52.0 %    MCV 80 80 - 100 fL    MCH 26.9 26.0 - 34.0 pg    MCHC 33.4 32.0 - 36.0 g/dL    RDW 14.9 (H) 11.5 - 14.5 %    Platelets 258 150 - 450 x10*3/uL    Neutrophils % 74.8 40.0 - 80.0 %    Immature Granulocytes %, Automated 1.0 (H) 0.0 - 0.9 %    Lymphocytes % 8.6 13.0 - 44.0 %    Monocytes % 14.0 2.0 - 10.0 %    Eosinophils % 1.3 0.0 - 6.0 %    Basophils % 0.3 0.0 - 2.0 %    Neutrophils Absolute 8.95 (H) 1.20 - 7.70 x10*3/uL    Immature Granulocytes Absolute, Automated 0.12 0.00 - 0.70 x10*3/uL    Lymphocytes Absolute 1.03 (L) 1.20 - 4.80 x10*3/uL    Monocytes Absolute 1.67 (H) 0.10 - 1.00 x10*3/uL    Eosinophils Absolute 0.16 0.00 - 0.70 x10*3/uL    Basophils Absolute 0.03 0.00 - 0.10 x10*3/uL   Comprehensive metabolic panel   Result Value Ref Range    Glucose 165 (H) 74 - 99 mg/dL    Sodium 133 (L) 136 - 145 mmol/L    Potassium 5.4 (H) 3.5 - 5.3 mmol/L    Chloride 92 (L) 98 - 107 mmol/L    Bicarbonate 31 21 - 32 mmol/L    Anion Gap 15 10 - 20 mmol/L    Urea Nitrogen 22 6 - 23 mg/dL    Creatinine 0.86 0.50 - 1.30 mg/dL    eGFR >90 >60 mL/min/1.73m*2    Calcium 10.0 8.6 - 10.3 mg/dL    Albumin 3.9 3.4 - 5.0 g/dL    Alkaline Phosphatase 255 (H) 33 - 120 U/L    Total Protein 9.0 (H) 6.4 - 8.2 g/dL    AST 66 (H) 9 - 39 U/L    Bilirubin, Total 0.4 0.0 - 1.2 mg/dL    ALT 61 (H) 10 - 52 U/L   Magnesium   Result Value Ref Range    Magnesium 2.46 (H)  1.60 - 2.40 mg/dL   Lactate   Result Value Ref Range    Lactate 1.6 0.4 - 2.0 mmol/L   Morphology   Result Value Ref Range    RBC Morphology See Below     Giant Platelets Few    Urinalysis with Reflex Culture and Microscopic   Result Value Ref Range    Color, Urine Dark-Brown (N) Light-Yellow, Yellow, Dark-Yellow    Appearance, Urine Ex.Turbid (N) Clear    Specific Gravity, Urine 1.039 (N) 1.005 - 1.035    pH, Urine 6.0 5.0, 5.5, 6.0, 6.5, 7.0, 7.5, 8.0    Protein, Urine 100 (2+) (A) NEGATIVE, 10 (TRACE), 20 (TRACE) mg/dL    Glucose, Urine Normal Normal mg/dL    Blood, Urine OVER (3+) (A) NEGATIVE    Ketones, Urine 10 (1+) (A) NEGATIVE mg/dL    Bilirubin, Urine NEGATIVE NEGATIVE    Urobilinogen, Urine Normal Normal mg/dL    Nitrite, Urine NEGATIVE NEGATIVE    Leukocyte Esterase, Urine 500 Chaz/µL (A) NEGATIVE   Microscopic Only, Urine   Result Value Ref Range    WBC, Urine >50 (A) 1-5, NONE /HPF    RBC, Urine >20 (A) NONE, 1-2, 3-5 /HPF   XR abdomen 1 view    Result Date: 8/12/2024  Interpreted By:  Melissa Adkins, STUDY: XR ABDOMEN 1 VIEW;  8/12/2024 3:43 am   INDICATION: Signs/Symptoms:POST NG TUBE ASSESMENT. PLEASE CALL MICAH Bradford, to confirm it is inserted.   COMPARISON: Abdominal x-rays 08/09/2024 CT scan of the abdomen pelvis 08/12/2020   ACCESSION NUMBER(S): [id number]   ORDERING CLINICIAN: NADJA SAEED   FINDINGS: Study is limited secondary to patient positioning. Enteric tube is seen to the level of the lower thoracic esophagus with side hole above the GE junction. This needs to be advanced slightly.   The right lateral abdomen and pelvis are excluded from the field of view limiting evaluation. Gaseous distended dilated loops of small bowel are noted in the upper abdomen measuring up to 5.5 cm consistent with small bowel obstruction as noted on CT. Limited evaluation of pneumoperitoneum on supine imaging. No pneumatosis or portal venous gas.  No abnormal calcifications.   Left basilar airspace opacities  again noted.   Osseous structures demonstrate no acute bony changes.       1. Enteric tube is noted in the distal esophagus with side hole above the GE junction. Consider advancement and repeat imaging. 2. Multiple dilated loops of small bowel are again visualized consistent with small bowel obstruction as noted on CT. Please refer to separate report of CT for additional detail.   MACRO: None   Signed by: Melissa Adkins 8/12/2024 4:01 AM Dictation workstation:   [id number]    XR chest 1 view    Result Date: 8/12/2024  Interpreted By:  Finkelstein, Evan, STUDY: XR CHEST 1 VIEW;  8/12/2024 2:14 am   INDICATION: Signs/Symptoms:s/p ng tube placement, concern for aspiration.   COMPARISON: Chest radiograph 08/09/2024. CT abdomen pelvis 08/12/2024   ACCESSION NUMBER(S): [id number]   ORDERING CLINICIAN: NADJA SAEED   FINDINGS: NG tube courses just below the diaphragm terminating at midline in the upper abdomen. Side hole at the level of the mid esophagus.   CARDIOMEDIASTINAL SILHOUETTE: Cardiomediastinal silhouette is normal in size and configuration.   LUNGS: Low lung volumes with bronchovascular crowding. Bibasilar opacities. No pleural effusion or pneumothorax.   ABDOMEN: Prominent lucencies underlying the diaphragms bilaterally, likely corresponding to air in the stomach and colon anterior to the liver as seen on earlier same day CT abdomen pelvis.   BONES: No acute osseous abnormality.       NG tube courses just below the diaphragm terminating midline likely near the GE junction. Side hole overlying the mid esophagus. Recommend advancement. Low lung volumes with bronchovascular crowding. Bibasilar opacities may represent atelectasis with aspiration/pneumonia not excluded in the appropriate clinical setting.   MACRO: None.   Signed by: Evan Finkelstein 8/12/2024 3:03 AM Dictation workstation:   [id number]    CT abdomen pelvis wo IV contrast    Result Date: 8/12/2024  Interpreted By:  Tommy Colon, STUDY: CT  ABDOMEN PELVIS WO IV CONTRAST;  8/12/2024 12:56 am   INDICATION: Signs/Symptoms:abd pain.   COMPARISON: 08/09/2024 CT abdomen/pelvis and 08/09/2022 CT abdomen/pelvis   ACCESSION NUMBER(S): [id number]   ORDERING CLINICIAN: NADJA SAEED   TECHNIQUE: Contiguous axial images of the abdomen and pelvis were obtained without intravenous contrast. Coronal and sagittal reformatted images were reconstructed from the axial data.   FINDINGS: Note: The absence of intravenous contrast limits evaluation of the solid organs and vasculature.   LOWER CHEST: There new consolidation in the lingula. There are patchy atelectatic and ground-glass opacities in the lower lobes.     ABDOMEN/PELVIS:   ABDOMINAL WALL: There is a small fat containing umbilical hernia and laxity of the linea alba.   LIVER: The liver demonstrates a normal noncontrast attenuation.   BILE DUCTS: No significant intrahepatic or extrahepatic dilatation.   GALLBLADDER: No significant abnormality.   PANCREAS: No significant abnormality.   SPLEEN: No significant abnormality.   ADRENALS: No significant abnormality.   KIDNEYS, URETERS, BLADDER:  No nephroureterolithiasis or hydroureteronephrosis. A new Robles catheter in the bladder. There is new perivesical inflammatory fat stranding suspicious for cystitis.   REPRODUCTIVE ORGANS: The prostate gland is enlarged, measuring 5.5 cm in transverse dimension.   VESSELS: No significant abnormality.   RETROPERITONEUM/LYMPH NODES: No enlarged lymph nodes. No acute retroperitoneal abnormality.   BOWEL/MESENTERY/PERITONEUM: The stomach is severely dilated with air-fluid level and demonstrates gastroesophageal reflux into the distal esophagus extending proximally beyond the scan range. The esophageal wall is diffusely thickened. There is new dilatation of the duodenum extending into the jejunum. There significantly degraded jejunal dilatation compared to prior study, measuring > 5 cm. There are air-fluid levels throughout the  dilated jejunum. There again a relatively gradual transition point now located in the right lower quadrant which this loop demonstrates diffuse wall thickening and perienteric edema/vascular engorgement. The on this the small bowel is decompressed.   There is again diffuse rectal wall thickening, appearing similar to progressed from prior study which may be due to differences in distension or proctitis/stercoral colitis.     MUSCULOSKELETAL: No acute osseous abnormality. No suspicious osseous lesion.       1. Significant worsening of small bowel obstruction. Specifically, there is severe gastric dilatation and air-fluid level, large volume gastroesophageal reflux, new diffuse duodenal dilatation, and significantly greater severity and length of remaining small bowel dilatation with air-fluid levels, > 5 cm. Transition point in the right lower quadrant, with relatively gradual narrowing to decompressed caliber, in which again the bowel demonstrates diffuse wall thickening. Recommend surgical consultation and NG tube placement.   2. Aspiration pneumonia in the lingula secondary to large volume gastroesophageal reflux from high-grade bowel obstruction.   3. Diffuse rectal wall thickening, appearing similar to slightly greater compared to prior study. Correlate for proctitis/stercoral colitis. However, because this is new from 2023, recommend follow-up with gastroenterology for colonoscopy evaluation in order to exclude malignancy.   MACRO: Tommy Colon discussed the significance and urgency of this critical finding , which was acknowledged by the provider, via [id number] with  NADJA SAEED on 8/12/2024 at 1:45 am.  (**-RCF-**) Findings:  See findings.   Signed by: Tommy Colon 8/12/2024 1:45 AM Dictation workstation:   [id number]        Assessment/Plan   Principal Problem:    Intestinal obstruction, unspecified cause, unspecified whether partial or complete (Multi)  Active Problems:    Acute hypoxic  respiratory failure (Multi)    Aspiration pneumonia of both lungs due to gastric secretions (Multi)    Hyponatremia    Transaminitis    Leukocytosis    Sepsis with acute hypoxic respiratory failure (Multi)    Constipation      Plan  Neuro:  -Nonverbal   -Seizure precautions  -Resume home AEDs IV after med rec  -Consider pain medications if remains tachycardic, unable to verbalize pain  -CAM ICU assessment and ABCDEF bundle    CV:  -Tachycardic  -Continuous cardiac telemetry and Q1 vitals  -Obtain EKG  -Resume home meds after med rec if appropriate    Pulm:  -On NRB in ED, transitioned to HFNC  -HFNC 50LPM 60%  -CXR: low lung volumes with bronchovascular crowding, bibasilar opacities  -CT: Aspiration pneumonia in the lingula secondary to large volume gastroesophageal reflux from high-grade bowel obstruction.  -Maintain pulse ox > 92%, wean as tolerated  -Bronchial hygiene    :  -BUN/Cr 22/0.86  -Monitor daily CMP  -Strict I/Os, maintain swain placed in ED  -Intravenous hydration with LR at 75cc per hour  -Give additional 1L LR for GI losses/tachycardia (~600cc output from new NGT on arrival to ICU)  -Monitor and replace electrolytes per protocol    GI:  -NPO  -NGT to LIWS  -Repeat KUB after NGT replaced as 1st KUB showed NGT coiled in throat/mouth and ending in esophagus  -GI prophylaxis: Protonix  -Bowel regimen: Enema, resume PO bowel regimen when able  -General Surgery consulted, appreciate recommendations    Endo:  -Accuchecks Q4 while NPO, start SSI if glucose < 180  -Hypoglycemia protocol    Heme:  -H/H 16.9/50.6   -Monitor daily CBC  -DVT Prophylaxis: SCDs, Lovenox    ID:  -afebrile, mild leukocytosis 12  -CT: aspiration pneumonia, diffuse rectal wall thickening (proctitis/stercoral colitis)  -UA: +leuks, >50 WBCs   -Urine and blood cultures pending  -Given Zosyn in ED, continue Zosyn    LDAs: PIVs, swain 8/12, NGT 8/12    Dispo: Admit to ICU for close respiratory and hemodynamic monitoring.           I spent 35 minutes in the professional and overall care of this patient.      Yesika Scott, APRN-CNP

## 2024-08-13 ENCOUNTER — APPOINTMENT (OUTPATIENT)
Dept: RADIOLOGY | Facility: HOSPITAL | Age: 59
End: 2024-08-13
Payer: MEDICAID

## 2024-08-13 LAB
ALBUMIN SERPL BCP-MCNC: 2.8 G/DL (ref 3.4–5)
ALP SERPL-CCNC: 107 U/L (ref 33–120)
ALT SERPL W P-5'-P-CCNC: 25 U/L (ref 10–52)
ANION GAP SERPL CALC-SCNC: 12 MMOL/L (ref 10–20)
AST SERPL W P-5'-P-CCNC: 22 U/L (ref 9–39)
BACTERIA UR CULT: NORMAL
BILIRUB SERPL-MCNC: 0.9 MG/DL (ref 0–1.2)
BUN SERPL-MCNC: 22 MG/DL (ref 6–23)
CALCIUM SERPL-MCNC: 8.3 MG/DL (ref 8.6–10.3)
CHLORIDE SERPL-SCNC: 104 MMOL/L (ref 98–107)
CO2 SERPL-SCNC: 28 MMOL/L (ref 21–32)
CREAT SERPL-MCNC: 0.94 MG/DL (ref 0.5–1.3)
EGFRCR SERPLBLD CKD-EPI 2021: >90 ML/MIN/1.73M*2
ERYTHROCYTE [DISTWIDTH] IN BLOOD BY AUTOMATED COUNT: 14.6 % (ref 11.5–14.5)
GLUCOSE BLD MANUAL STRIP-MCNC: 106 MG/DL (ref 74–99)
GLUCOSE BLD MANUAL STRIP-MCNC: 112 MG/DL (ref 74–99)
GLUCOSE BLD MANUAL STRIP-MCNC: 127 MG/DL (ref 74–99)
GLUCOSE BLD MANUAL STRIP-MCNC: 127 MG/DL (ref 74–99)
GLUCOSE BLD MANUAL STRIP-MCNC: 91 MG/DL (ref 74–99)
GLUCOSE BLD MANUAL STRIP-MCNC: 93 MG/DL (ref 74–99)
GLUCOSE SERPL-MCNC: 109 MG/DL (ref 74–99)
HCT VFR BLD AUTO: 43.4 % (ref 41–52)
HGB BLD-MCNC: 13.8 G/DL (ref 13.5–17.5)
LACTATE SERPL-SCNC: 1.1 MMOL/L (ref 0.4–2)
MAGNESIUM SERPL-MCNC: 2.21 MG/DL (ref 1.6–2.4)
MCH RBC QN AUTO: 26.3 PG (ref 26–34)
MCHC RBC AUTO-ENTMCNC: 31.8 G/DL (ref 32–36)
MCV RBC AUTO: 83 FL (ref 80–100)
NRBC BLD-RTO: 0 /100 WBCS (ref 0–0)
PHOSPHATE SERPL-MCNC: 2 MG/DL (ref 2.5–4.9)
PLATELET # BLD AUTO: 192 X10*3/UL (ref 150–450)
POTASSIUM SERPL-SCNC: 3.4 MMOL/L (ref 3.5–5.3)
PROT SERPL-MCNC: 5.9 G/DL (ref 6.4–8.2)
RBC # BLD AUTO: 5.24 X10*6/UL (ref 4.5–5.9)
SODIUM SERPL-SCNC: 141 MMOL/L (ref 136–145)
WBC # BLD AUTO: 19 X10*3/UL (ref 4.4–11.3)

## 2024-08-13 PROCEDURE — 92610 EVALUATE SWALLOWING FUNCTION: CPT | Mod: GN | Performed by: SPEECH-LANGUAGE PATHOLOGIST

## 2024-08-13 PROCEDURE — 2500000004 HC RX 250 GENERAL PHARMACY W/ HCPCS (ALT 636 FOR OP/ED): Performed by: NURSE PRACTITIONER

## 2024-08-13 PROCEDURE — 82947 ASSAY GLUCOSE BLOOD QUANT: CPT

## 2024-08-13 PROCEDURE — 74250 X-RAY XM SM INT 1CNTRST STD: CPT

## 2024-08-13 PROCEDURE — 2500000004 HC RX 250 GENERAL PHARMACY W/ HCPCS (ALT 636 FOR OP/ED)

## 2024-08-13 PROCEDURE — 74250 X-RAY XM SM INT 1CNTRST STD: CPT | Performed by: RADIOLOGY

## 2024-08-13 PROCEDURE — 99232 SBSQ HOSP IP/OBS MODERATE 35: CPT | Performed by: SURGERY

## 2024-08-13 PROCEDURE — 99232 SBSQ HOSP IP/OBS MODERATE 35: CPT

## 2024-08-13 PROCEDURE — 85027 COMPLETE CBC AUTOMATED: CPT

## 2024-08-13 PROCEDURE — 2550000001 HC RX 255 CONTRASTS: Performed by: INTERNAL MEDICINE

## 2024-08-13 PROCEDURE — 2500000005 HC RX 250 GENERAL PHARMACY W/O HCPCS

## 2024-08-13 PROCEDURE — 83735 ASSAY OF MAGNESIUM: CPT

## 2024-08-13 PROCEDURE — 2020000001 HC ICU ROOM DAILY

## 2024-08-13 PROCEDURE — C9254 INJECTION, LACOSAMIDE: HCPCS

## 2024-08-13 PROCEDURE — 2500000001 HC RX 250 WO HCPCS SELF ADMINISTERED DRUGS (ALT 637 FOR MEDICARE OP)

## 2024-08-13 PROCEDURE — 80053 COMPREHEN METABOLIC PANEL: CPT

## 2024-08-13 PROCEDURE — 84100 ASSAY OF PHOSPHORUS: CPT

## 2024-08-13 PROCEDURE — 36415 COLL VENOUS BLD VENIPUNCTURE: CPT

## 2024-08-13 PROCEDURE — 83605 ASSAY OF LACTIC ACID: CPT

## 2024-08-13 RX ORDER — SODIUM CHLORIDE, SODIUM LACTATE, POTASSIUM CHLORIDE, CALCIUM CHLORIDE 600; 310; 30; 20 MG/100ML; MG/100ML; MG/100ML; MG/100ML
75 INJECTION, SOLUTION INTRAVENOUS CONTINUOUS
Status: ACTIVE | OUTPATIENT
Start: 2024-08-13 | End: 2024-08-14

## 2024-08-13 RX ORDER — POTASSIUM PHOSPHATE, MONOBASIC AND POTASSIUM PHOSPHATE, DIBASIC 224; 236 MG/ML; MG/ML
15 INJECTION, SOLUTION INTRAVENOUS ONCE
Status: COMPLETED | OUTPATIENT
Start: 2024-08-13 | End: 2024-08-13

## 2024-08-13 RX ORDER — BISACODYL 10 MG/1
10 SUPPOSITORY RECTAL
Status: DISCONTINUED | OUTPATIENT
Start: 2024-08-13 | End: 2024-08-16 | Stop reason: HOSPADM

## 2024-08-13 RX ORDER — POTASSIUM CHLORIDE 14.9 MG/ML
20 INJECTION INTRAVENOUS ONCE
Status: COMPLETED | OUTPATIENT
Start: 2024-08-13 | End: 2024-08-13

## 2024-08-13 ASSESSMENT — COGNITIVE AND FUNCTIONAL STATUS - GENERAL
STANDING UP FROM CHAIR USING ARMS: TOTAL
DAILY ACTIVITIY SCORE: 6
MOBILITY SCORE: 6
HELP NEEDED FOR BATHING: TOTAL
MOVING FROM LYING ON BACK TO SITTING ON SIDE OF FLAT BED WITH BEDRAILS: TOTAL
DRESSING REGULAR LOWER BODY CLOTHING: TOTAL
CLIMB 3 TO 5 STEPS WITH RAILING: TOTAL
PERSONAL GROOMING: TOTAL
TURNING FROM BACK TO SIDE WHILE IN FLAT BAD: TOTAL
TOILETING: TOTAL
WALKING IN HOSPITAL ROOM: TOTAL
EATING MEALS: TOTAL
DRESSING REGULAR UPPER BODY CLOTHING: TOTAL
MOVING TO AND FROM BED TO CHAIR: TOTAL

## 2024-08-13 ASSESSMENT — PAIN - FUNCTIONAL ASSESSMENT: PAIN_FUNCTIONAL_ASSESSMENT: CPOT (CRITICAL CARE PAIN OBSERVATION TOOL)

## 2024-08-13 NOTE — PROGRESS NOTES
Maximo Pablo  [id number]   1965       Subjective/      Seen in coverage for acute care general surgery    Conversation carried out with nursing staff.  Patient's been comfortable over the last shift.  They noted 100 cc output from the NG tube    Patient has had no grimacing or evidence of abdominal pain    No bowel movement                  Heart Rate:  []   Temp:  [37 °C (98.6 °F)-38.2 °C (100.8 °F)]   Resp:  [12-30]   BP: ()/(53-98)   Weight:  [74.5 kg (164 lb 3.9 oz)]   SpO2:  [85 %-100 %]     Intake/Output Summary (Last 24 hours) at 8/13/2024 0802  Last data filed at 8/13/2024 0600  Gross per 24 hour   Intake 1225 ml   Output 1350 ml   Net -125 ml          Physical Exam  Constitutional:       Comments: Nonverbal contracted male with NG tube   Abdominal:      Comments: Nondistended    Nontender but patient nonverbal                Results for orders placed or performed during the hospital encounter of 08/11/24 (from the past 24 hour(s))   POCT GLUCOSE   Result Value Ref Range    POCT Glucose 173 (H) 74 - 99 mg/dL   Lactate   Result Value Ref Range    Lactate 1.6 0.4 - 2.0 mmol/L   SST TOP   Result Value Ref Range    Extra Tube Hold for add-ons.    POCT GLUCOSE   Result Value Ref Range    POCT Glucose 158 (H) 74 - 99 mg/dL   POCT GLUCOSE   Result Value Ref Range    POCT Glucose 139 (H) 74 - 99 mg/dL   POCT GLUCOSE   Result Value Ref Range    POCT Glucose 127 (H) 74 - 99 mg/dL   Lactate   Result Value Ref Range    Lactate 1.1 0.4 - 2.0 mmol/L   Comprehensive Metabolic Panel   Result Value Ref Range    Glucose 109 (H) 74 - 99 mg/dL    Sodium 141 136 - 145 mmol/L    Potassium 3.4 (L) 3.5 - 5.3 mmol/L    Chloride 104 98 - 107 mmol/L    Bicarbonate 28 21 - 32 mmol/L    Anion Gap 12 10 - 20 mmol/L    Urea Nitrogen 22 6 - 23 mg/dL    Creatinine 0.94 0.50 - 1.30 mg/dL    eGFR >90 >60 mL/min/1.73m*2    Calcium 8.3 (L) 8.6 - 10.3 mg/dL    Albumin 2.8 (L) 3.4 - 5.0 g/dL    Alkaline Phosphatase 107 33 - 120  U/L    Total Protein 5.9 (L) 6.4 - 8.2 g/dL    AST 22 9 - 39 U/L    Bilirubin, Total 0.9 0.0 - 1.2 mg/dL    ALT 25 10 - 52 U/L   Magnesium   Result Value Ref Range    Magnesium 2.21 1.60 - 2.40 mg/dL   CBC   Result Value Ref Range    WBC 19.0 (H) 4.4 - 11.3 x10*3/uL    nRBC 0.0 0.0 - 0.0 /100 WBCs    RBC 5.24 4.50 - 5.90 x10*6/uL    Hemoglobin 13.8 13.5 - 17.5 g/dL    Hematocrit 43.4 41.0 - 52.0 %    MCV 83 80 - 100 fL    MCH 26.3 26.0 - 34.0 pg    MCHC 31.8 (L) 32.0 - 36.0 g/dL    RDW 14.6 (H) 11.5 - 14.5 %    Platelets 192 150 - 450 x10*3/uL   Phosphorus   Result Value Ref Range    Phosphorus 2.0 (L) 2.5 - 4.9 mg/dL   POCT GLUCOSE   Result Value Ref Range    POCT Glucose 127 (H) 74 - 99 mg/dL   POCT GLUCOSE   Result Value Ref Range    POCT Glucose 112 (H) 74 - 99 mg/dL        I reviewed the above studies      ECG 12 Lead    Result Date: 8/12/2024  Sinus tachycardia Possible Left atrial enlargement Cannot rule out Anterior infarct , age undetermined Abnormal ECG When compared with ECG of 14-OCT-2023 17:22, Minimal criteria for Anterior infarct are now Present ST no longer depressed in Inferior leads ST no longer elevated in Lateral leads Nonspecific T wave abnormality no longer evident in Inferior leads    XR abdomen 1 view    Result Date: 8/12/2024  Interpreted By:  Tommy Colon, STUDY: XR ABDOMEN 1 VIEW;  8/12/2024 5:45 am   INDICATION: Signs/Symptoms:Ngt replaced.   COMPARISON: 08/12/2024   ACCESSION NUMBER(S): [id number]   ORDERING CLINICIAN: OMAR HERRERA   FINDINGS: NG/OG tube terminates in the gastric body. There is persistent dilatation of small bowel loops up to 6 cm in the central abdomen.   No evidence of free intraperitoneal air.   No pneumatosis or portal venous gas identified.   No acute osseous abnormalities.   Left basilar consolidation representing known lingular airspace disease on CT abdomen/pelvis.       NG/OG tube terminates in the gastric body. There is persistent dilatation of small  bowel loops up to 6 cm in the central abdomen.   Redemonstration of lingular pneumonia/aspiration pneumonitis.   MACRO: None.   Signed by: Tommy Colon 8/12/2024 5:47 AM Dictation workstation:   [id number]    XR abdomen 1 view    Result Date: 8/12/2024  Interpreted By:  Melissa Adkins, STUDY: XR ABDOMEN 1 VIEW;  8/12/2024 3:43 am   INDICATION: Signs/Symptoms:POST NG TUBE ASSESMENT. PLEASE CALL MICAH Bradford, to confirm it is inserted.   COMPARISON: Abdominal x-rays 08/09/2024 CT scan of the abdomen pelvis 08/12/2020   ACCESSION NUMBER(S): [id number]   ORDERING CLINICIAN: NADJA SAEED   FINDINGS: Study is limited secondary to patient positioning. Enteric tube is seen to the level of the lower thoracic esophagus with side hole above the GE junction. This needs to be advanced slightly.   The right lateral abdomen and pelvis are excluded from the field of view limiting evaluation. Gaseous distended dilated loops of small bowel are noted in the upper abdomen measuring up to 5.5 cm consistent with small bowel obstruction as noted on CT. Limited evaluation of pneumoperitoneum on supine imaging. No pneumatosis or portal venous gas.  No abnormal calcifications.   Left basilar airspace opacities again noted.   Osseous structures demonstrate no acute bony changes.       1. Enteric tube is noted in the distal esophagus with side hole above the GE junction. Consider advancement and repeat imaging. 2. Multiple dilated loops of small bowel are again visualized consistent with small bowel obstruction as noted on CT. Please refer to separate report of CT for additional detail.   MACRO: None   Signed by: Melissa Adkins 8/12/2024 4:01 AM Dictation workstation:   [id number]    XR chest 1 view    Result Date: 8/12/2024  Interpreted By:  Finkelstein, Evan, STUDY: XR CHEST 1 VIEW;  8/12/2024 2:14 am   INDICATION: Signs/Symptoms:s/p ng tube placement, concern for aspiration.   COMPARISON: Chest radiograph 08/09/2024. CT abdomen  pelvis 08/12/2024   ACCESSION NUMBER(S): [id number]   ORDERING CLINICIAN: NADJA SAEED   FINDINGS: NG tube courses just below the diaphragm terminating at midline in the upper abdomen. Side hole at the level of the mid esophagus.   CARDIOMEDIASTINAL SILHOUETTE: Cardiomediastinal silhouette is normal in size and configuration.   LUNGS: Low lung volumes with bronchovascular crowding. Bibasilar opacities. No pleural effusion or pneumothorax.   ABDOMEN: Prominent lucencies underlying the diaphragms bilaterally, likely corresponding to air in the stomach and colon anterior to the liver as seen on earlier same day CT abdomen pelvis.   BONES: No acute osseous abnormality.       NG tube courses just below the diaphragm terminating midline likely near the GE junction. Side hole overlying the mid esophagus. Recommend advancement. Low lung volumes with bronchovascular crowding. Bibasilar opacities may represent atelectasis with aspiration/pneumonia not excluded in the appropriate clinical setting.   MACRO: None.   Signed by: Evan Finkelstein 8/12/2024 3:03 AM Dictation workstation:   [id number]    CT abdomen pelvis wo IV contrast    Result Date: 8/12/2024  Interpreted By:  Tommy Colon, STUDY: CT ABDOMEN PELVIS WO IV CONTRAST;  8/12/2024 12:56 am   INDICATION: Signs/Symptoms:abd pain.   COMPARISON: 08/09/2024 CT abdomen/pelvis and 08/09/2022 CT abdomen/pelvis   ACCESSION NUMBER(S): [id number]   ORDERING CLINICIAN: NADJA SAEED   TECHNIQUE: Contiguous axial images of the abdomen and pelvis were obtained without intravenous contrast. Coronal and sagittal reformatted images were reconstructed from the axial data.   FINDINGS: Note: The absence of intravenous contrast limits evaluation of the solid organs and vasculature.   LOWER CHEST: There new consolidation in the lingula. There are patchy atelectatic and ground-glass opacities in the lower lobes.     ABDOMEN/PELVIS:   ABDOMINAL WALL: There is a small fat  containing umbilical hernia and laxity of the linea alba.   LIVER: The liver demonstrates a normal noncontrast attenuation.   BILE DUCTS: No significant intrahepatic or extrahepatic dilatation.   GALLBLADDER: No significant abnormality.   PANCREAS: No significant abnormality.   SPLEEN: No significant abnormality.   ADRENALS: No significant abnormality.   KIDNEYS, URETERS, BLADDER:  No nephroureterolithiasis or hydroureteronephrosis. A new Robles catheter in the bladder. There is new perivesical inflammatory fat stranding suspicious for cystitis.   REPRODUCTIVE ORGANS: The prostate gland is enlarged, measuring 5.5 cm in transverse dimension.   VESSELS: No significant abnormality.   RETROPERITONEUM/LYMPH NODES: No enlarged lymph nodes. No acute retroperitoneal abnormality.   BOWEL/MESENTERY/PERITONEUM: The stomach is severely dilated with air-fluid level and demonstrates gastroesophageal reflux into the distal esophagus extending proximally beyond the scan range. The esophageal wall is diffusely thickened. There is new dilatation of the duodenum extending into the jejunum. There significantly degraded jejunal dilatation compared to prior study, measuring > 5 cm. There are air-fluid levels throughout the dilated jejunum. There again a relatively gradual transition point now located in the right lower quadrant which this loop demonstrates diffuse wall thickening and perienteric edema/vascular engorgement. The on this the small bowel is decompressed.   There is again diffuse rectal wall thickening, appearing similar to progressed from prior study which may be due to differences in distension or proctitis/stercoral colitis.     MUSCULOSKELETAL: No acute osseous abnormality. No suspicious osseous lesion.       1. Significant worsening of small bowel obstruction. Specifically, there is severe gastric dilatation and air-fluid level, large volume gastroesophageal reflux, new diffuse duodenal dilatation, and significantly  greater severity and length of remaining small bowel dilatation with air-fluid levels, > 5 cm. Transition point in the right lower quadrant, with relatively gradual narrowing to decompressed caliber, in which again the bowel demonstrates diffuse wall thickening. Recommend surgical consultation and NG tube placement.   2. Aspiration pneumonia in the lingula secondary to large volume gastroesophageal reflux from high-grade bowel obstruction.   3. Diffuse rectal wall thickening, appearing similar to slightly greater compared to prior study. Correlate for proctitis/stercoral colitis. However, because this is new from 2023, recommend follow-up with gastroenterology for colonoscopy evaluation in order to exclude malignancy.   MACRO: Tommy Colon discussed the significance and urgency of this critical finding , which was acknowledged by the provider, via [employer] with  NADJA SAEED on 8/12/2024 at 1:45 am.  (**-RCF-**) Findings:  See findings.   Signed by: Tommy Colon 8/12/2024 1:45 AM Dictation workstation:   [id number]    CT abdomen pelvis wo IV contrast    Result Date: 8/9/2024  Interpreted By:  Tommy Colon, STUDY: CT ABDOMEN PELVIS WO IV CONTRAST;  8/9/2024 8:30 pm   INDICATION: Signs/Symptoms:abnormal xr, rule out obstruction.   COMPARISON: 08/09/2023 CT abdomen/pelvis   ACCESSION NUMBER(S): [id number]   ORDERING CLINICIAN: WILBERT FUNES   TECHNIQUE: Contiguous axial images of the abdomen and pelvis were obtained without intravenous contrast. Coronal and sagittal reformatted images were reconstructed from the axial data.   FINDINGS: Note: The absence of intravenous contrast limits evaluation of the solid organs and vasculature.   LOWER CHEST: No acute abnormality.     ABDOMEN/PELVIS:   ABDOMINAL WALL: There is a small fat containing umbilical hernia and laxity of the linea alba.   LIVER: The liver demonstrates a normal noncontrast attenuation.   BILE DUCTS: No significant intrahepatic or  extrahepatic dilatation.   GALLBLADDER: No significant abnormality.   PANCREAS: No significant abnormality.   SPLEEN: No significant abnormality.   ADRENALS: No significant abnormality.   KIDNEYS, URETERS, BLADDER:  No nephroureterolithiasis or hydroureteronephrosis. The bladder wall is normal thickness for degree of distention.   REPRODUCTIVE ORGANS: No significant abnormality.   VESSELS: No significant abnormality.   RETROPERITONEUM/LYMPH NODES: No enlarged lymph nodes. No acute retroperitoneal abnormality.   BOWEL/MESENTERY/PERITONEUM: Stomach and duodenum appear normal. There is dilatation of jejunal loops with air-fluid levels, measuring up to 4 cm. There is gradual tapering of jejunal caliber to normal in the mid abdomen; however, dislocation there is increasing fecalization of the bowel content. The remainder of the small bowel is decompressed. There is a moderate to large diffuse colonic stool burden the less pronounced compared to prior study. The rectum is distended up to 6 cm. There is diffuse rectal wall thickening likely due to chronic stercoral colitis.   No ascites, free air, or fluid collection.     MUSCULOSKELETAL: No acute osseous abnormality. No suspicious osseous lesion.       Dilated jejunal loops measuring up to 4 cm air-fluid levels; however, there is relatively gradual transition point to decompressed caliber in the mid abdomen where there is fecalization of the bowel content likely relating to stasis/hypomotility. At this location there is a diffuse wall thickening of the jejunum, nonspecific enteropathy. Therefore, findings could be at most related to a mild/partial or early bowel obstruction.   Diffuse moderate to large colonic stool burden again greatest in the rectum which is distended up to 5.9 cm. There is a rectal wall thickening diffusely with some perirectal stranding likely representing active chronic stercoral colitis given the propensity to have massive fecal impaction  "(08/09/2023). Recommend follow-up to address causes of bowel hypomotility.     MACRO: None.   Signed by: Tommy Colon 8/9/2024 9:19 PM Dictation workstation:   [id number]    XR abdomen 1 view    Result Date: 8/9/2024  Interpreted By:  Luigi Benites, STUDY: XR CHEST 1 VIEW; XR ABDOMEN 1 VIEW   INDICATION: Signs/Symptoms:Patient is from a group home reported to be \"not himself\" less energy; Signs/Symptoms:Patient reported to have less energy, history of constipation.   COMPARISON: October 14, 2023   ACCESSION NUMBER(S): [id number]; [id number]   ORDERING CLINICIAN: SHERLEY WEBSTER   FINDINGS: Low lung volumes with some minimal basilar atelectasis similar to the prior study. No consolidation, effusion, edema, or pneumothorax.   Numerous dilated bowel loops are again noted but are grossly similar to a previous abdominal exam.   Limited evaluation for air-fluid levels or free air on the supine radiographs.       Multiple dilated small bowel loops. While these are grossly similar to a previous examination of August 10, 2023, the possibility of a component of bowel obstruction is not excluded. Correlation with the patient's clinical symptomatology and presentation can determine the need for CT of the abdomen and pelvis for further evaluation.   No evidence of acute intrathoracic abnormality.   MACRO: Critical Finding:  See findings. Notification was initiated on 8/9/2024 at 5:41 pm by  Luigi Benites.  (**-YCF-**) Instructions:   Signed by: Luigi Benites 8/9/2024 5:41 PM Dictation workstation:   [id number]    XR chest 1 view    Result Date: 8/9/2024  Interpreted By:  Luigi Benites, STUDY: XR CHEST 1 VIEW; XR ABDOMEN 1 VIEW   INDICATION: Signs/Symptoms:Patient is from a group home reported to be \"not himself\" less energy; Signs/Symptoms:Patient reported to have less energy, history of constipation.   COMPARISON: October 14, 2023   ACCESSION NUMBER(S): [id number]; [id number]   ORDERING CLINICIAN: SHERLEY WEBSTER   " FINDINGS: Low lung volumes with some minimal basilar atelectasis similar to the prior study. No consolidation, effusion, edema, or pneumothorax.   Numerous dilated bowel loops are again noted but are grossly similar to a previous abdominal exam.   Limited evaluation for air-fluid levels or free air on the supine radiographs.       Multiple dilated small bowel loops. While these are grossly similar to a previous examination of August 10, 2023, the possibility of a component of bowel obstruction is not excluded. Correlation with the patient's clinical symptomatology and presentation can determine the need for CT of the abdomen and pelvis for further evaluation.   No evidence of acute intrathoracic abnormality.   MACRO: Critical Finding:  See findings. Notification was initiated on 8/9/2024 at 5:41 pm by  Luigi Benites.  (**-YCF-**) Instructions:   Signed by: Luigi Benites 8/9/2024 5:41 PM Dictation workstation:   [id number]       I have reviewed the images and the reports        Assessment/    Partial small bowel obstruction      Plan/    Patient is difficult to assess.  I think the good news is that the NG output is only 100 cc per shift, there is been no abdominal distention, and the patient appears to be comfortable.    Recommend proceeding to Gastrografin small bowel study in order to further assess    I have reviewed this with the onsite ICU resident      All Almeida MD

## 2024-08-13 NOTE — SIGNIFICANT EVENT
ICU to [facility] Transfer Summary     I:  ICU Admission Reason & Brief ICU Course:      59-year-old male with past medical history significant for prior small bowel obstruction, constipation with impaction, cerebral palsy and epilepsy, MRDD nonverbal at baseline admitted to the ICU for close hemodynamic monitoring and management of high-grade small bowel obstruction with acute hypoxic respiratory failure on high flow nasal cannula secondary to aspiration pneumonitis versus aspiration pneumonia with secondary diagnoses of hyponatremia, transaminitis, and constipation.  Started on Zosyn for aspiration pneumonia versus pneumonitis as well as a possible cystitis on UA.  Urine culture was negative suggesting low probability of UTI.  NG tube was placed and patient was decompressed after which patient was able to be weaned down to 2 L via nasal cannula.  Initially tachycardic on admission which improved after decompression via NG tube and with continuous LR infusion at 75 cc/h.  Home dose of Keppra was switched to IV and neurology was consulted as there are no IV formulations for Trileptal and patient was strictly n.p.o. upon admission.  Was started on IV Vimpat per neurology recommendations.  SBFT was performed today which was reviewed by general surgeon Dr. Almeida who advised consistent with partial SBO without evidence of complete obstruction which does not require surgery at this time.  After the procedure patient had a large bowel movement.  Per Dr. Almeida NG tube can be discontinued and patient can be trialed on ice chips.  No longer requires ICU needs at this time and is deemed safe for downgrade to the floor with telemetry under the care of Dr. Husain.      C: Code Status/DPOA Info/Goals of Care/ACP Note    Full Code  DPOA/Contact Number: Aminah hair [phone]    U: Unprescribing & Pertinent High-Risk Medications    Changes to home meds: Held home p.o. meds while NG tube is in place.  Consider restarting home  medications including home antihypertensive medications and transitioning patient back to Trileptal per neurology recommendations.     Anticoagulation: Lovenox 40 mg    Antibiotics:   [] Zosyn 3.375 every 8 hours    P: Pending Tests at the Time of Transfer     None    A: Active consultants, including Rehab:   [x]  Subspecialty Consultants: General surgery, GI, neurology  [x]  PT  [x]  OT  []  SLP  []  Wound Care    U: Uncertainty Measure/Diagnostic Pause:    Working diagnosis at the time of transfer: partial small bowel obstruction, hypoxic respiratory failure secondary to aspiration pneumonia versus pneumonitis     Diagnosis Degree of Certainty: 1. High degree of certainty about the clinical diagnosis.     S: Summary of Major Problems and To-Dos:   # Continue to manage partial small bowel obstruction and chronic constipation per general surgery and GI recs  # Consider de-escalating Zosyn if patient continues to improve clinically  # Restart patient's p.o. meds when cleared to do so by general surgery including transitioning patient back to home Trileptal and off Vimpat per neurology recommendations       E: Exam, including Lines/Drains/Airways & Data Review:     Vitals and nursing note reviewed.   Constitutional:       General: He is not in acute distress.     Appearance: He is ill-appearing. He is not toxic-appearing or diaphoretic.   HENT:      Head: Normocephalic and atraumatic.      Right Ear: External ear normal.      Left Ear: External ear normal.      Nose: Nose normal.      Comments: NG tube in place     Mouth/Throat:      Pharynx: Oropharynx is clear.   Eyes:      Conjunctiva/sclera: Conjunctivae normal.   Cardiovascular:      Rate and Rhythm: Regular rate and rhythm.     Pulses: Normal pulses.      Heart sounds: Normal heart sounds.   Pulmonary:      Comments:  Diminished right side, rhonchi RLL, clear left side  Abdominal:      General: Abdomen is flat. There is no distension.      Palpations: Abdomen is  soft. There is no mass.      Tenderness: There is no guarding.      Hernia: No hernia is present.      Comments:  Hypoactive bowel sounds, brown emesis from NGT   Genitourinary:     Comments: External catheter in place.  Musculoskeletal:      Cervical back: Normal range of motion and neck supple. No rigidity.      Comments: Contracted upper and lower extremities at baseline.   Lymphadenopathy:      Cervical: No cervical adenopathy.   Neurological:      Mental Status: Mental status is at baseline.      Comments: Contracted upper and lower extremities at baseline, nonverbal, opens eyes spontaneously, tracks objects    Difficult airway? No  Lines/drains assessed for removal?  NG tube removed.  Continue external urinary catheter.    Within 30 minutes of the patient physically leaving the floor, a Floor Readiness Note needs to be placed with updated vitals.

## 2024-08-13 NOTE — PROGRESS NOTES
Department of Internal Medicine  Gastroenterology  Progress note      Subjective  GI is following for small bowel obstruction, stercoral colitis.     Patient did not have bowel movement in last 24 hours despite enemas x2, suppositories, aggressive bowel regimen. He remains stable, comfortable, and does not appear to have abdominal pain on exam. 100 ml output from NG overnight.       Current Medication    Current Facility-Administered Medications:     [Held by provider] amLODIPine (Norvasc) tablet 10 mg, 10 mg, oral, Daily, Kevin Craft DO    [Held by provider] aspirin chewable tablet 81 mg, 81 mg, oral, Daily, Kevin Craft DO    bisacodyl (Dulcolax) suppository 10 mg, 10 mg, rectal, Daily, Yesika Scott APRN-CNP    chlorhexidine (Peridex) 0.12 % solution 15 mL, 15 mL, Mouth/Throat, BID, Kevin Craft DO, 15 mL at 08/12/24 2059    [Held by provider] cloNIDine (Catapres) tablet 0.2 mg, 0.2 mg, oral, BID, Kevin Craft DO    dextrose 50 % injection 12.5 g, 12.5 g, intravenous, q15 min PRN, Yesika Shermanfny, APRN-CNP    dextrose 50 % injection 25 g, 25 g, intravenous, q15 min PRN, Yesika Shermanfny, APRN-CNP    enoxaparin (Lovenox) syringe 40 mg, 40 mg, subcutaneous, q24h, Yesika Shermanfny, APRN-CNP, 40 mg at 08/12/24 0844    glucagon (Glucagen) injection 1 mg, 1 mg, intramuscular, q15 min PRN, Yesika Shermanfny, APRN-CNP    glucagon (Glucagen) injection 1 mg, 1 mg, intramuscular, q15 min PRN, Yesikashari Shermanfny, APRN-CNP    [Held by provider] hydroCHLOROthiazide (Microzide) tablet 12.5 mg, 12.5 mg, oral, Daily, Kevin Craft, DO    [Held by provider] labetalol (Normodyne) tablet 100 mg, 100 mg, oral, BID, Kevin Craft DO    lacosamide (Vimpat) 100 mg in sodium chloride 0.9% 100 mL IVPB, 100 mg, intravenous, q12h, Kevin Craft DO, Stopped at 08/13/24 0638    lactated Ringer's infusion, 75 mL/hr, intravenous, Continuous, GERTRUDIS Dela Cruz-CNP, Last Rate: 75 mL/hr at  08/12/24 1639, 75 mL/hr at 08/12/24 1639    levETIRAcetam (Keppra) 500 mg in sodium chloride (iso)  mL, 500 mg, intravenous, q12h, Kevin Craft DO, Stopped at 08/13/24 0043    [Held by provider] lisinopril tablet 20 mg, 20 mg, oral, BID, Kevin Craft DO    ondansetron (Zofran) injection 4 mg, 4 mg, intravenous, q4h PRN, VIANEY Dela Cruz    [Held by provider] OXcarbazepine (Trileptal) tablet 450 mg, 450 mg, oral, BID, Kevin Craft DO    oxygen (O2) therapy, , inhalation, Continuous - Inhalation, Kevin Craft DO, 2 L/min at 08/12/24 2000    pantoprazole (ProtoNix) injection 40 mg, 40 mg, intravenous, Daily before breakfast, GERTRUDIS Dela Cruz-CNP, 40 mg at 08/13/24 0606    piperacillin-tazobactam (Zosyn) 4.5 g in dextrose (iso)  mL, 4.5 g, intravenous, q8h, GERTRUDIS Dela Cruz-CNP, Stopped at 08/13/24 0100    potassium chloride 20 mEq in sterile water for injection 100 mL, 20 mEq, intravenous, Once, GERTRUDIS Dela Cruz-CNP, Last Rate: 50 mL/hr at 08/13/24 0642, 20 mEq at 08/13/24 0642    potassium phosphates in dextrose 5% 250 mL IV 15 mmol, 15 mmol, intravenous, Once, Yesika Scott APRN-CNP, Last Rate: 62.5 mL/hr at 08/13/24 0544, 15 mmol at 08/13/24 0544    sodium phosphates (Fleets) 19-7 gram/118 mL enema 1 enema, 1 enema, rectal, Daily, GERTRUDIS Dela Cruz-CNP, 1 enema at 08/12/24 0853    Past Medical History  Active Ambulatory Problems     Diagnosis Date Noted    Other fatigue 08/09/2024     Resolved Ambulatory Problems     Diagnosis Date Noted    No Resolved Ambulatory Problems     Past Medical History:   Diagnosis Date    Epilepsy, unspecified, not intractable, without status epilepticus (Multi)     Hyperlipidemia, unspecified     Personal history of other diseases of the circulatory system     Personal history of other diseases of the nervous system and sense organs     Personal history of other endocrine, nutritional and metabolic disease      Personal history of other specified conditions        PHYSICAL EXAM  VS: /70   Pulse 86   Temp 37 °C (98.6 °F)   Resp 12   Wt 74.5 kg (164 lb 3.9 oz)   SpO2 98%   BMI 24.25 kg/m²  Body mass index is 24.25 kg/m².  Physical Exam  Vitals and nursing note reviewed.   Constitutional:       General: He is not in acute distress.     Appearance: He is comfortable appearing, non-toxic.    HENT:      Head: Normocephalic.      Nose:      Comments: NG in place  Eyes:      Conjunctiva/sclera: Conjunctivae normal.   Cardiovascular:      Rate and Rhythm: Normal rate and regular rhythm.   Pulmonary:      Effort: Pulmonary effort is normal.      Breath sounds: Normal breath sounds.   Abdominal:      General: There is distension.      Palpations: Abdomen is soft.      Tenderness: There is no abdominal tenderness.      Hernia: A hernia (umbilical, reducible) is present.      Comments: Hypoactive bowel sounds noted in all 4 quadrants   Genitourinary:     Comments: Robles catheter in place  Musculoskeletal:      Cervical back: Neck supple.      Right lower leg: No edema.      Left lower leg: No edema.      Comments: Patient is significantly contracted 2/2 cerebral palsy    Skin:     General: Skin is warm and dry.   Neurological:      Mental Status: Mental status is at baseline.         DATA  Recent blood work and relevant radiology and endoscopic studies were reviewed and discussed with the patient   Results from last 7 days   Lab Units 08/13/24  0358   WBC AUTO x10*3/uL 19.0*   RBC AUTO x10*6/uL 5.24   HEMOGLOBIN g/dL 13.8   HEMATOCRIT % 43.4   MCV fL 83   MCHC g/dL 31.8*   RDW % 14.6*   PLATELETS AUTO x10*3/uL 192       Results from last 72 hours   Lab Units 08/13/24  0358   SODIUM mmol/L 141   POTASSIUM mmol/L 3.4*   CHLORIDE mmol/L 104   CO2 mmol/L 28   BUN mg/dL 22   CREATININE mg/dL 0.94   CALCIUM mg/dL 8.3*   PROTEIN TOTAL g/dL 5.9*   BILIRUBIN TOTAL mg/dL 0.9   ALK PHOS U/L 107   AST U/L 22   ALT U/L 25          IMPRESSION/RECOMMENDATIONS    Maximo Pablo is a 60 yo M with past medical history of cerebral palsy, and MRDD nonverbal, HTN, epilepsy, prior SBO who presents from group home for vomiting. He was found to have worsening of small bowel obstruction, severe gastric dilatation and air-fluid levels, large volume gastroesophageal reflux, new diffuse duodenal dilatation, transition point in right lower quadrant, aspiration pneumonia, and proctitis/stercoral colitis on CT imaging. NGT in place with 800 ml light brown output total yesterday. Enemas, suppositories and aggressive bowel regimen with only minimal output. Plan for further evaluation of SBO with general surgery later today via small bowel study with gastrografin.        #Small Bowel Obstruction   #Stercoral colitis     Plan:   -Daily enema,   -Daily suppository  -Continue NGT/NPO  -Add Linzess to aggressive bowel regimen at discharge  -Colonscopy outpatient  -SBFT with Dr. Almeida planned for today for further eval of SBO   -GI will continue to follow. Please reach out for any questions.     The patient was seen and discussed with attending, Dr. Costello.     Geneva Doran DO  Family Medicine Resident, PGY-2    (Electronically signed byGeneva Doran DO on 8/13/2024 at 8:02 AM)

## 2024-08-13 NOTE — PROGRESS NOTES
Update at 1745.    Small bowel follow-through shows contrast in colon.    Nursing staff reports the patient has been passing multiple BMs and loose stools since return to the [facility]    Current physical exam demonstrates a nontender nondistended abdomen    Overall picture consistent with partial small bowel obstruction or ileus.  No complete obstruction.  No surgery needed.    May remove NG tube

## 2024-08-13 NOTE — PROGRESS NOTES
Critical Care Daily Progress        Subjective   Patient is a 59 y.o. male admitted on 8/11/2024 10:35 PM with the following indication(s) for ICU care small bowel obstruction with hypoxic respiratory failure secondary to aspiration requiring HFNC    Overnight Events: Output from NG has slowed significantly within the last 12 hours.  Overnight patient had only 100 cc output from NG.  Urine output has increased to 700 cc in the last 12 hours.  Still no bowel movement overnight.    Complaints: Patient is nonverbal at baseline.    Scheduled Medications:   [Held by provider] amLODIPine, 10 mg, oral, Daily  [Held by provider] aspirin, 81 mg, oral, Daily  bisacodyl, 10 mg, rectal, q24h MEAGHAN  chlorhexidine, 15 mL, Mouth/Throat, BID  [Held by provider] cloNIDine, 0.2 mg, oral, BID  enoxaparin, 40 mg, subcutaneous, q24h  [Held by provider] hydroCHLOROthiazide, 12.5 mg, oral, Daily  [Held by provider] labetalol, 100 mg, oral, BID  lacosamide, 100 mg, intravenous, q12h  levETIRAcetam, 500 mg, intravenous, q12h  [Held by provider] lisinopril, 20 mg, oral, BID  [Held by provider] OXcarbazepine, 450 mg, oral, BID  oxygen, , inhalation, Continuous - Inhalation  pantoprazole, 40 mg, intravenous, Daily before breakfast  piperacillin-tazobactam, 3.375 g, intravenous, q8h  sodium phosphates, 1 enema, rectal, Daily         Continuous Medications:   lactated Ringer's, 75 mL/hr, Last Rate: 75 mL/hr (08/12/24 1639)         PRN Medications:   PRN medications: dextrose, dextrose, glucagon, glucagon, ondansetron    Objective   Vitals:  Temp  Min: 36 °C (96.8 °F)  Max: 38.2 °C (100.8 °F)  Pulse  Min: 80  Max: 118  BP  Min: 84/55  Max: 157/70  Resp  Min: 12  Max: 25  SpO2  Min: 85 %  Max: 99 %    Physical Exam:   Physical Exam   Physical Exam  Vitals and nursing note reviewed.   Constitutional:       General: He is not in acute distress.     Appearance: He is ill-appearing. He is not toxic-appearing or diaphoretic.   HENT:      Head:  Normocephalic and atraumatic.      Right Ear: External ear normal.      Left Ear: External ear normal.      Nose: Nose normal.      Comments: NG tube in place     Mouth/Throat:      Pharynx: Oropharynx is clear.   Eyes:      Conjunctiva/sclera: Conjunctivae normal.   Cardiovascular:      Rate and Rhythm: Regular rhythm. Tachycardia present.      Pulses: Normal pulses.      Heart sounds: Normal heart sounds.   Pulmonary:      Comments:  Diminished right side, rhonchi RLL, clear left side  Abdominal:      General: Abdomen is flat. There is no distension.      Palpations: Abdomen is soft. There is no mass.      Tenderness: There is no guarding.      Hernia: No hernia is present.      Comments:  Hypoactive bowel sounds, brown emesis from NGT   Genitourinary:     Comments: External catheter in place.  Musculoskeletal:      Cervical back: Normal range of motion and neck supple. No rigidity.      Comments: Contracted upper and lower extremities at baseline.   Lymphadenopathy:      Cervical: No cervical adenopathy.   Neurological:      Mental Status: Mental status is at baseline.      Comments: Contracted upper and lower extremities at baseline, nonverbal, opens eyes spontaneously, tracks objects                Assessment/Plan   Overall Assessment:    59-year-old male with past medical history significant for prior small bowel obstruction, constipation with impaction, cerebral palsy and epilepsy, MRDD nonverbal at baseline admitted to the ICU for close hemodynamic monitoring and management of high-grade small bowel obstruction with acute hypoxic respiratory failure on high flow nasal cannula secondary to aspiration pneumonitis versus aspiration pneumonia with secondary diagnoses of hyponatremia, transaminitis, and constipation.    Neuro:   - History: Cerebral palsy with severe muscle contractions, MRDD nonverbal at baseline, epilepsy   - Home meds: Trileptal and Keppra  - Continue IV Vimpat and Keppra  - Neurology on  consult, appreciate recs  - [facility] ICU    Cardiovascular:   # Sinus tachycardia  - History: Hypertension  - Goals:  [MAP> 65, HR ]  - Home meds: Clonidine, Norvasc, labetalol, hydrochlorothiazide, lisinopril  - Last echo: None on file  - Holding home blood pressure medications at this time we will consider reintroducing blood pressure medications if patient becomes hypertensive  -Tachycardia improving with fluid resuscitation and bowel decompression.    Pulmonary:   # Aspiration pneumonia versus pneumonitis  # Hypoxic respiratory failure.  -Currently saturating in the high 90s on 2 L nasal cannula  Continuous pulse oximetry   O2 PRN to maintain SpO2 > 94%, wean as tolerated  -CXR: low lung volumes with bronchovascular crowding, bibasilar opacities  -CT: Aspiration pneumonia in the lingula secondary to large volume gastroesophageal reflux from high-grade bowel obstruction.    Gastrointestinal:   # Partial versus complete small bowel obstruction  Results from last 7 days   Lab Units 08/13/24  0358 08/12/24  0551 08/11/24  2336 08/09/24  1841   ALK PHOS U/L 107 195* 255* 215*   AST U/L 22 35 66* 48*   ALT U/L 25 45 61* 48       - History: Constipation, SBO  - Home meds: MiraLAX, Colace and senna  - Diet: N.p.o.   - NG tube to LIWS.  100 cc output from NG tube within the past 12 hours  - GI and general surgery on consult.  Appreciate recs.  - General Surgeon Dr. Almeida reviewed [facility] and advised consistent with partial SBO and patient does not require surgery at this time.  Shortly after returning from [facility] nursing staff advised patient had a large bowel movement.  Dr. Almeida updated on results and advised he will come evaluate the patient at bedside.  - Prophylaxis: protonix  - Bowel regimen: Fleet enema, Dulcolax suppositories  - Last BM: Last BM Date: 08/12/24     Renal:   Results from last 7 days   Lab Units 08/13/24  0358 08/12/24  0551 08/11/24  2336 08/09/24  1841   SODIUM mmol/L 141 140 133* 134*   POTASSIUM  mmol/L 3.4* 3.6 5.4* 4.3   MAGNESIUM mg/dL 2.21 1.90 2.46* 2.19   PHOSPHORUS mg/dL 2.0* 3.8  --   --    BUN mg/dL 22  22 11   CREATININE mg/dL 0.94 1.02 0.86 0.70       Net IO Since Admission: 1,995.25 mL [24 1625]  - Daily CMP,Mg,Phos  - External urinary catheter  - Fluids: LR 75 cc/h mL/hr  - Electrolytes: Replete per protocol, goal K>4 Phos >3 Mg >2    Endocrine:   Results from last 7 days   Lab Units 24  1545 24  0756 24  0409 24  0358 24  0007 24  2054 24  1538 24  0757 24  0551   POCT GLUCOSE mg/dL 106* 112* 127*  --  127* 139* 158*   < >  --    GLUCOSE mg/dL  --   --   --  109*  --   --   --   --  107*    < > = values in this interval not displayed.      - BG < 180 goal.  Start sliding scale insulin if BG over 180  -Hypoglycemia protocol    Hematology:   Results from last 7 days   Lab Units 24  0358 24  0551 24  23324  1841   HEMOGLOBIN g/dL 13.8 16.1 16.9 14.2   HEMATOCRIT % 43.4 49.7 50.6 43.9   PLATELETS AUTO x10*3/uL 192 209 258 260     - Daily CBC  - DVT Prophylaxis: SCDs, Lovenox    Infectious Disease:   # Aspiration pneumonia versus pneumonitis  Results from last 7 days   Lab Units 24  0358 24  0551 24  2336 24  1841   WBC AUTO x10*3/uL 19.0* 4.4 12.0* 8.4     Temp (24hrs), Av.2 °C (98.9 °F), Min:36 °C (96.8 °F), Max:38.2 °C (100.8 °F)     -WBCs up trended from 4.4 yesterday to 19 today.  Serum lactate continues to downtrend.  1.6 yesterday and 1.1 this morning.  - Urine culture negative suggesting low probability of urinary tract infection  - Aspiration pneumonia versus pneumonitis on CT as well as diffuse rectal wall thickening suggestive of proctitis versus stercoral colitis  - BCs from  pending  - Continue Zosyn pending cultures    Musculoskeletal:   - PT/OT on consult appreciate recs      LDA:  NG/OG/Feeding Tube NG - [place] sump 16 Fr Left nostril (Active)   Placement Date/Time:  08/12/24 0515   Placed by: Rocio Johnson  Hand Hygiene Completed: Yes  Type of Tube: NG/OG Tube  Tube Length: 63 cm  Tube Type: NG - [place] sump  NG/OG Tube Size: 16 Fr  Tube Location: Left nostril   Number of days: 0       External Urinary Catheter Male (Active)   Placement Date/Time: 08/12/24 1200   Earliest Known Present: 08/12/24  Placed by: Juan Clark RN  Hand Hygiene Completed: Yes  External Catheter Type: Male   Number of days: 0         CODE STATUS: Full Code    Disposition: No ICU needs at this time.  Plan for downgrade to the floor.    RESTRAINTS: type: None    ABCDEF Checklist  Analgesia: Spontaneous awakening trial to be pursued if clinically appropriate. RASS goal reviewed  Breathing: Spontaneous breathing trial to be pursued if clinically appropriate. Mechanical power of assisted ventilation reviewed  Choice of analgesia/sedation: Analgesic and sedative agents adjusted per clinical context.   Delirium assessed by CAM, will avoid exacerbating factors  Early mobility and exercise: Physical and occupational therapy engaged  Family: Plan of care, overall trajectory of patient shared with family. Questions elicited and answered as appropriate.     Due to the high probability of life threatening clinical decompensation, the patient required critical care time evaluating and managing this patient.  Critical care time included obtaining a history, examining the patient, ordering and reviewing studies, discussing, developing, and implementing a management plan, evaluating the patient's response to treatment, and discussion with other care team providers. I saw and evaluated the patient myself.  Critical care time was performed exclusive of billable procedures.    Critical care time:       Case discussed with attending , Dr. Hawa Craft DO

## 2024-08-14 LAB
ALBUMIN SERPL BCP-MCNC: 2.8 G/DL (ref 3.4–5)
ALP SERPL-CCNC: 118 U/L (ref 33–120)
ALT SERPL W P-5'-P-CCNC: 23 U/L (ref 10–52)
ANION GAP SERPL CALC-SCNC: 17 MMOL/L (ref 10–20)
AST SERPL W P-5'-P-CCNC: 25 U/L (ref 9–39)
BILIRUB SERPL-MCNC: 0.8 MG/DL (ref 0–1.2)
BUN SERPL-MCNC: 12 MG/DL (ref 6–23)
CALCIUM SERPL-MCNC: 8.7 MG/DL (ref 8.6–10.3)
CHLORIDE SERPL-SCNC: 107 MMOL/L (ref 98–107)
CO2 SERPL-SCNC: 27 MMOL/L (ref 21–32)
CREAT SERPL-MCNC: 0.59 MG/DL (ref 0.5–1.3)
EGFRCR SERPLBLD CKD-EPI 2021: >90 ML/MIN/1.73M*2
ERYTHROCYTE [DISTWIDTH] IN BLOOD BY AUTOMATED COUNT: 14.8 % (ref 11.5–14.5)
GLUCOSE BLD MANUAL STRIP-MCNC: 121 MG/DL (ref 74–99)
GLUCOSE BLD MANUAL STRIP-MCNC: 166 MG/DL (ref 74–99)
GLUCOSE BLD MANUAL STRIP-MCNC: 78 MG/DL (ref 74–99)
GLUCOSE BLD MANUAL STRIP-MCNC: 79 MG/DL (ref 74–99)
GLUCOSE BLD MANUAL STRIP-MCNC: 89 MG/DL (ref 74–99)
GLUCOSE SERPL-MCNC: 78 MG/DL (ref 74–99)
HCT VFR BLD AUTO: 36.4 % (ref 41–52)
HGB BLD-MCNC: 11.8 G/DL (ref 13.5–17.5)
MAGNESIUM SERPL-MCNC: 2.1 MG/DL (ref 1.6–2.4)
MCH RBC QN AUTO: 26.8 PG (ref 26–34)
MCHC RBC AUTO-ENTMCNC: 32.4 G/DL (ref 32–36)
MCV RBC AUTO: 83 FL (ref 80–100)
NRBC BLD-RTO: 0 /100 WBCS (ref 0–0)
PHOSPHATE SERPL-MCNC: 2.1 MG/DL (ref 2.5–4.9)
PLATELET # BLD AUTO: 238 X10*3/UL (ref 150–450)
POTASSIUM SERPL-SCNC: 4.6 MMOL/L (ref 3.5–5.3)
PROT SERPL-MCNC: 6.4 G/DL (ref 6.4–8.2)
RBC # BLD AUTO: 4.41 X10*6/UL (ref 4.5–5.9)
SODIUM SERPL-SCNC: 146 MMOL/L (ref 136–145)
WBC # BLD AUTO: 25.5 X10*3/UL (ref 4.4–11.3)

## 2024-08-14 PROCEDURE — 2020000001 HC ICU ROOM DAILY

## 2024-08-14 PROCEDURE — 2500000005 HC RX 250 GENERAL PHARMACY W/O HCPCS: Performed by: NURSE PRACTITIONER

## 2024-08-14 PROCEDURE — 2500000001 HC RX 250 WO HCPCS SELF ADMINISTERED DRUGS (ALT 637 FOR MEDICARE OP): Performed by: NURSE PRACTITIONER

## 2024-08-14 PROCEDURE — 84100 ASSAY OF PHOSPHORUS: CPT

## 2024-08-14 PROCEDURE — 36415 COLL VENOUS BLD VENIPUNCTURE: CPT | Performed by: NURSE PRACTITIONER

## 2024-08-14 PROCEDURE — 92526 ORAL FUNCTION THERAPY: CPT | Mod: GN | Performed by: SPEECH-LANGUAGE PATHOLOGIST

## 2024-08-14 PROCEDURE — 99232 SBSQ HOSP IP/OBS MODERATE 35: CPT | Performed by: SURGERY

## 2024-08-14 PROCEDURE — C9254 INJECTION, LACOSAMIDE: HCPCS | Performed by: NURSE PRACTITIONER

## 2024-08-14 PROCEDURE — 83735 ASSAY OF MAGNESIUM: CPT | Performed by: NURSE PRACTITIONER

## 2024-08-14 PROCEDURE — 2500000004 HC RX 250 GENERAL PHARMACY W/ HCPCS (ALT 636 FOR OP/ED)

## 2024-08-14 PROCEDURE — 82947 ASSAY GLUCOSE BLOOD QUANT: CPT

## 2024-08-14 PROCEDURE — 2500000002 HC RX 250 W HCPCS SELF ADMINISTERED DRUGS (ALT 637 FOR MEDICARE OP, ALT 636 FOR OP/ED): Performed by: NURSE PRACTITIONER

## 2024-08-14 PROCEDURE — 99232 SBSQ HOSP IP/OBS MODERATE 35: CPT | Performed by: NURSE PRACTITIONER

## 2024-08-14 PROCEDURE — 2500000004 HC RX 250 GENERAL PHARMACY W/ HCPCS (ALT 636 FOR OP/ED): Performed by: NURSE PRACTITIONER

## 2024-08-14 PROCEDURE — 85027 COMPLETE CBC AUTOMATED: CPT | Performed by: NURSE PRACTITIONER

## 2024-08-14 PROCEDURE — 84075 ASSAY ALKALINE PHOSPHATASE: CPT | Performed by: NURSE PRACTITIONER

## 2024-08-14 RX ORDER — AMLODIPINE BESYLATE 10 MG/1
10 TABLET ORAL ONCE
Status: COMPLETED | OUTPATIENT
Start: 2024-08-14 | End: 2024-08-14

## 2024-08-14 ASSESSMENT — COGNITIVE AND FUNCTIONAL STATUS - GENERAL
PERSONAL GROOMING: TOTAL
MOBILITY SCORE: 6
EATING MEALS: TOTAL
EATING MEALS: TOTAL
MOVING TO AND FROM BED TO CHAIR: TOTAL
TURNING FROM BACK TO SIDE WHILE IN FLAT BAD: TOTAL
MOVING FROM LYING ON BACK TO SITTING ON SIDE OF FLAT BED WITH BEDRAILS: TOTAL
DRESSING REGULAR LOWER BODY CLOTHING: TOTAL
TOILETING: TOTAL
CLIMB 3 TO 5 STEPS WITH RAILING: TOTAL
MOVING TO AND FROM BED TO CHAIR: TOTAL
TURNING FROM BACK TO SIDE WHILE IN FLAT BAD: TOTAL
CLIMB 3 TO 5 STEPS WITH RAILING: TOTAL
WALKING IN HOSPITAL ROOM: TOTAL
HELP NEEDED FOR BATHING: TOTAL
STANDING UP FROM CHAIR USING ARMS: TOTAL
MOBILITY SCORE: 6
WALKING IN HOSPITAL ROOM: TOTAL
TOILETING: TOTAL
DRESSING REGULAR LOWER BODY CLOTHING: TOTAL
HELP NEEDED FOR BATHING: TOTAL
DRESSING REGULAR UPPER BODY CLOTHING: TOTAL
DRESSING REGULAR UPPER BODY CLOTHING: TOTAL
PERSONAL GROOMING: TOTAL
MOVING FROM LYING ON BACK TO SITTING ON SIDE OF FLAT BED WITH BEDRAILS: TOTAL
STANDING UP FROM CHAIR USING ARMS: TOTAL
DAILY ACTIVITIY SCORE: 6
DAILY ACTIVITIY SCORE: 6

## 2024-08-14 ASSESSMENT — PAIN SCALES - WONG BAKER: WONGBAKER_NUMERICALRESPONSE: NO HURT

## 2024-08-14 NOTE — ASSESSMENT & PLAN NOTE
Related to aspiration pneumonia continue to be on oxygen and IV antibiotics white count today is 19 will monitor

## 2024-08-14 NOTE — CARE PLAN
Pt was a ICU transfer this shift. Pt remains on seizure precaution/ Aspiration precautions. Pt was able to tolerate IV ATB well this shift. Pt remains on 2L. Pt tolerated IV Keprra and Vimpat this shift. Pt has mitt on for safety. Pt has LR at 75ml. Pt is nonverbal with is his baseline. Pt safety been maintained.

## 2024-08-14 NOTE — H&P
History Of Present Illness  Maximo Pablo is a 59 y.o. male presenting with past medical history of cerebral palsy MRDD nonverbal epilepsy admitted initially to the hospital with high small bowel obstruction initially was admitted to ICU patient had large stool burden treated with bowel regimen with the consultation of GI and surgery patient also developed aspiration pneumonia and treated with antibiotics  Patient did okay start eating and having bowel movement and he was transferred to regular medical floor patient is nonverbal cannot get any history from him.     Past Medical History  He has a past medical history of Epilepsy, unspecified, not intractable, without status epilepticus (Multi), Hyperlipidemia, unspecified, Personal history of other diseases of the circulatory system, Personal history of other diseases of the nervous system and sense organs, Personal history of other endocrine, nutritional and metabolic disease, and Personal history of other specified conditions.    Surgical History  He has a past surgical history that includes Other surgical history (11/26/2019).     Social History  He has no history on file for tobacco use, alcohol use, and drug use.    Family History  No family history on file.     Allergies  Patient has no known allergies.    Review of Systems   Nonverbal related to his MRDD     Physical Exam  HENT:      Right Ear: External ear normal.      Left Ear: External ear normal.      Mouth/Throat:      Mouth: Mucous membranes are moist.   Cardiovascular:      Rate and Rhythm: Normal rate and regular rhythm.      Heart sounds: No murmur heard.     No friction rub. No gallop.   Pulmonary:      Effort: No accessory muscle usage or respiratory distress.      Breath sounds: No stridor. No wheezing or rhonchi.   Chest:      Chest wall: No tenderness.   Abdominal:      General: There is no distension.      Palpations: There is no mass.      Tenderness: There is no abdominal tenderness. There  is no guarding or rebound.   Musculoskeletal:         General: No deformity or signs of injury.      Cervical back: No rigidity or tenderness. Normal range of motion.      Right lower leg: No edema.      Left lower leg: No edema.   Skin:     Coloration: Skin is not jaundiced or pale.      Findings: No lesion.   Neurological:   Nonverbal follow-up simple commands       Last Recorded Vitals  Blood pressure 166/84, pulse 77, temperature 37.2 °C (99 °F), temperature source Temporal, resp. rate 18, weight 76.1 kg (167 lb 11.2 oz), SpO2 98%.    Labs    Admission on 08/11/2024   Component Date Value Ref Range Status    WBC 08/11/2024 12.0 (H)  4.4 - 11.3 x10*3/uL Final    nRBC 08/11/2024 0.0  0.0 - 0.0 /100 WBCs Final    RBC 08/11/2024 6.29 (H)  4.50 - 5.90 x10*6/uL Final    Hemoglobin 08/11/2024 16.9  13.5 - 17.5 g/dL Final    Hematocrit 08/11/2024 50.6  41.0 - 52.0 % Final    MCV 08/11/2024 80  80 - 100 fL Final    MCH 08/11/2024 26.9  26.0 - 34.0 pg Final    MCHC 08/11/2024 33.4  32.0 - 36.0 g/dL Final    RDW 08/11/2024 14.9 (H)  11.5 - 14.5 % Final    Platelets 08/11/2024 258  150 - 450 x10*3/uL Final    Neutrophils % 08/11/2024 74.8  40.0 - 80.0 % Final    Immature Granulocytes %, Automated 08/11/2024 1.0 (H)  0.0 - 0.9 % Final    Immature Granulocyte Count (IG) includes promyelocytes, myelocytes and metamyelocytes but does not include bands. Percent differential counts (%) should be interpreted in the context of the absolute cell counts (cells/UL).    Lymphocytes % 08/11/2024 8.6  13.0 - 44.0 % Final    Monocytes % 08/11/2024 14.0  2.0 - 10.0 % Final    Eosinophils % 08/11/2024 1.3  0.0 - 6.0 % Final    Basophils % 08/11/2024 0.3  0.0 - 2.0 % Final    Neutrophils Absolute 08/11/2024 8.95 (H)  1.20 - 7.70 x10*3/uL Final    Percent differential counts (%) should be interpreted in the context of the absolute cell counts (cells/uL).    Immature Granulocytes Absolute, Au* 08/11/2024 0.12  0.00 - 0.70 x10*3/uL Final     Lymphocytes Absolute 08/11/2024 1.03 (L)  1.20 - 4.80 x10*3/uL Final    Monocytes Absolute 08/11/2024 1.67 (H)  0.10 - 1.00 x10*3/uL Final    Eosinophils Absolute 08/11/2024 0.16  0.00 - 0.70 x10*3/uL Final    Basophils Absolute 08/11/2024 0.03  0.00 - 0.10 x10*3/uL Final    Glucose 08/11/2024 165 (H)  74 - 99 mg/dL Final    Sodium 08/11/2024 133 (L)  136 - 145 mmol/L Final    Potassium 08/11/2024 5.4 (H)  3.5 - 5.3 mmol/L Final    MODERATE HEMOLYSIS DETECTED. The result may be falsely elevated due to hemolysis or other interferents. Clinical correlation is recommended. Repeat testing may be considered.    Chloride 08/11/2024 92 (L)  98 - 107 mmol/L Final    Bicarbonate 08/11/2024 31  21 - 32 mmol/L Final    Anion Gap 08/11/2024 15  10 - 20 mmol/L Final    Urea Nitrogen 08/11/2024 22  6 - 23 mg/dL Final    Creatinine 08/11/2024 0.86  0.50 - 1.30 mg/dL Final    eGFR 08/11/2024 >90  >60 mL/min/1.73m*2 Final    Calculations of estimated GFR are performed using the 2021 CKD-EPI Study Refit equation without the race variable for the IDMS-Traceable creatinine methods.  https://jasn.asnjournals.org/content/early/2021/09/22/ASN.1991492877    Calcium 08/11/2024 10.0  8.6 - 10.3 mg/dL Final    Albumin 08/11/2024 3.9  3.4 - 5.0 g/dL Final    Alkaline Phosphatase 08/11/2024 255 (H)  33 - 120 U/L Final    Total Protein 08/11/2024 9.0 (H)  6.4 - 8.2 g/dL Final    AST 08/11/2024 66 (H)  9 - 39 U/L Final    MODERATE HEMOLYSIS DETECTED. The result may be falsely elevated due to hemolysis or other interferents. Clinical correlation is recommended. Repeat testing may be considered.    Bilirubin, Total 08/11/2024 0.4  0.0 - 1.2 mg/dL Final    ALT 08/11/2024 61 (H)  10 - 52 U/L Final    Patients treated with Sulfasalazine may generate falsely decreased results for ALT.    Magnesium 08/11/2024 2.46 (H)  1.60 - 2.40 mg/dL Final    MODERATE HEMOLYSIS DETECTED. The result may be falsely elevated due to hemolysis or other interferents.  Clinical correlation is recommended. Repeat testing may be considered.    Lactate 08/11/2024 1.6  0.4 - 2.0 mmol/L Final    Blood Culture 08/11/2024 No growth at 1 day   Preliminary    Blood Culture 08/11/2024 No growth at 1 day   Preliminary    Color, Urine 08/12/2024 Dark-Brown (N)  Light-Yellow, Yellow, Dark-Yellow Final    Appearance, Urine 08/12/2024 Ex.Turbid (N)  Clear Final    Specific Gravity, Urine 08/12/2024 1.039 (N)  1.005 - 1.035 Final    pH, Urine 08/12/2024 6.0  5.0, 5.5, 6.0, 6.5, 7.0, 7.5, 8.0 Final    Protein, Urine 08/12/2024 100 (2+) (A)  NEGATIVE, 10 (TRACE), 20 (TRACE) mg/dL Final    Glucose, Urine 08/12/2024 Normal  Normal mg/dL Final    Blood, Urine 08/12/2024 OVER (3+) (A)  NEGATIVE Final    Ketones, Urine 08/12/2024 10 (1+) (A)  NEGATIVE mg/dL Final    Bilirubin, Urine 08/12/2024 NEGATIVE  NEGATIVE Final    Urobilinogen, Urine 08/12/2024 Normal  Normal mg/dL Final    Nitrite, Urine 08/12/2024 NEGATIVE  NEGATIVE Final    Leukocyte Esterase, Urine 08/12/2024 500 Chaz/µL (A)  NEGATIVE Final    Extra Tube 08/12/2024 Hold for add-ons.   Final    Auto resulted.    RBC Morphology 08/11/2024 See Below   Final    Giant Platelets 08/11/2024 Few   Final    WBC, Urine 08/12/2024 >50 (A)  1-5, NONE /HPF Final    RBC, Urine 08/12/2024 >20 (A)  NONE, 1-2, 3-5 /HPF Final    Urine Culture 08/12/2024 No significant growth   Final    Ventricular Rate 08/12/2024 130  BPM Preliminary    Atrial Rate 08/12/2024 130  BPM Preliminary    NH Interval 08/12/2024 142  ms Preliminary    QRS Duration 08/12/2024 72  ms Preliminary    QT Interval 08/12/2024 300  ms Preliminary    QTC Calculation(Bazett) 08/12/2024 441  ms Preliminary    P Axis 08/12/2024 48  degrees Preliminary    R Axis 08/12/2024 36  degrees Preliminary    T Axis 08/12/2024 61  degrees Preliminary    QRS Count 08/12/2024 21  beats Preliminary    Q Onset 08/12/2024 222  ms Preliminary    P Onset 08/12/2024 151  ms Preliminary    P Offset 08/12/2024 198   ms Preliminary    T Offset 08/12/2024 372  ms Preliminary    QTC Fredericia 08/12/2024 388  ms Preliminary    Glucose 08/12/2024 107 (H)  74 - 99 mg/dL Final    Sodium 08/12/2024 140  136 - 145 mmol/L Final    Potassium 08/12/2024 3.6  3.5 - 5.3 mmol/L Final    Chloride 08/12/2024 100  98 - 107 mmol/L Final    Bicarbonate 08/12/2024 31  21 - 32 mmol/L Final    Anion Gap 08/12/2024 13  10 - 20 mmol/L Final    Urea Nitrogen 08/12/2024 22  6 - 23 mg/dL Final    Creatinine 08/12/2024 1.02  0.50 - 1.30 mg/dL Final    eGFR 08/12/2024 86  >60 mL/min/1.73m*2 Final    Calculations of estimated GFR are performed using the 2021 CKD-EPI Study Refit equation without the race variable for the IDMS-Traceable creatinine methods.  https://jasn.asnjournals.org/content/early/2021/09/22/ASN.2609516415    Calcium 08/12/2024 9.0  8.6 - 10.3 mg/dL Final    Albumin 08/12/2024 3.1 (L)  3.4 - 5.0 g/dL Final    Alkaline Phosphatase 08/12/2024 195 (H)  33 - 120 U/L Final    Total Protein 08/12/2024 7.2  6.4 - 8.2 g/dL Final    AST 08/12/2024 35  9 - 39 U/L Final    Bilirubin, Total 08/12/2024 0.7  0.0 - 1.2 mg/dL Final    ALT 08/12/2024 45  10 - 52 U/L Final    Patients treated with Sulfasalazine may generate falsely decreased results for ALT.    Magnesium 08/12/2024 1.90  1.60 - 2.40 mg/dL Final    WBC 08/12/2024 4.4  4.4 - 11.3 x10*3/uL Final    nRBC 08/12/2024 0.0  0.0 - 0.0 /100 WBCs Final    RBC 08/12/2024 6.06 (H)  4.50 - 5.90 x10*6/uL Final    Hemoglobin 08/12/2024 16.1  13.5 - 17.5 g/dL Final    Hematocrit 08/12/2024 49.7  41.0 - 52.0 % Final    MCV 08/12/2024 82  80 - 100 fL Final    MCH 08/12/2024 26.6  26.0 - 34.0 pg Final    MCHC 08/12/2024 32.4  32.0 - 36.0 g/dL Final    RDW 08/12/2024 14.6 (H)  11.5 - 14.5 % Final    Platelets 08/12/2024 209  150 - 450 x10*3/uL Final    Phosphorus 08/12/2024 3.8  2.5 - 4.9 mg/dL Final    The performance characteristics of phosphorus testing in heparinized plasma have been validated by the  individual  laboratory site where testing is performed. Testing on heparinized plasma is not approved by the FDA; however, such approval is not necessary.    Procalcitonin 08/12/2024 12.13 (H)  <=0.07 ng/mL Final    POCT Glucose 08/12/2024 147 (H)  74 - 99 mg/dL Final    POCT Glucose 08/12/2024 173 (H)  74 - 99 mg/dL Final    Lactate 08/12/2024 1.6  0.4 - 2.0 mmol/L Final    Extra Tube 08/12/2024 Hold for add-ons.   Final    Auto resulted.    POCT Glucose 08/12/2024 158 (H)  74 - 99 mg/dL Final    Glucose 08/13/2024 109 (H)  74 - 99 mg/dL Final    Sodium 08/13/2024 141  136 - 145 mmol/L Final    Potassium 08/13/2024 3.4 (L)  3.5 - 5.3 mmol/L Final    Chloride 08/13/2024 104  98 - 107 mmol/L Final    Bicarbonate 08/13/2024 28  21 - 32 mmol/L Final    Anion Gap 08/13/2024 12  10 - 20 mmol/L Final    Urea Nitrogen 08/13/2024 22  6 - 23 mg/dL Final    Creatinine 08/13/2024 0.94  0.50 - 1.30 mg/dL Final    eGFR 08/13/2024 >90  >60 mL/min/1.73m*2 Final    Calculations of estimated GFR are performed using the 2021 CKD-EPI Study Refit equation without the race variable for the IDMS-Traceable creatinine methods.  https://jasn.asnjournals.org/content/early/2021/09/22/ASN.5661289596    Calcium 08/13/2024 8.3 (L)  8.6 - 10.3 mg/dL Final    Albumin 08/13/2024 2.8 (L)  3.4 - 5.0 g/dL Final    Alkaline Phosphatase 08/13/2024 107  33 - 120 U/L Final    Total Protein 08/13/2024 5.9 (L)  6.4 - 8.2 g/dL Final    AST 08/13/2024 22  9 - 39 U/L Final    Bilirubin, Total 08/13/2024 0.9  0.0 - 1.2 mg/dL Final    ALT 08/13/2024 25  10 - 52 U/L Final    Patients treated with Sulfasalazine may generate falsely decreased results for ALT.    Magnesium 08/13/2024 2.21  1.60 - 2.40 mg/dL Final    WBC 08/13/2024 19.0 (H)  4.4 - 11.3 x10*3/uL Final    nRBC 08/13/2024 0.0  0.0 - 0.0 /100 WBCs Final    RBC 08/13/2024 5.24  4.50 - 5.90 x10*6/uL Final    Hemoglobin 08/13/2024 13.8  13.5 - 17.5 g/dL Final    Hematocrit 08/13/2024 43.4  41.0 - 52.0 %  Final    MCV 08/13/2024 83  80 - 100 fL Final    MCH 08/13/2024 26.3  26.0 - 34.0 pg Final    MCHC 08/13/2024 31.8 (L)  32.0 - 36.0 g/dL Final    RDW 08/13/2024 14.6 (H)  11.5 - 14.5 % Final    Platelets 08/13/2024 192  150 - 450 x10*3/uL Final    Phosphorus 08/13/2024 2.0 (L)  2.5 - 4.9 mg/dL Final    The performance characteristics of phosphorus testing in heparinized plasma have been validated by the individual  laboratory site where testing is performed. Testing on heparinized plasma is not approved by the FDA; however, such approval is not necessary.    POCT Glucose 08/12/2024 139 (H)  74 - 99 mg/dL Final    POCT Glucose 08/13/2024 127 (H)  74 - 99 mg/dL Final    Lactate 08/13/2024 1.1  0.4 - 2.0 mmol/L Final    POCT Glucose 08/13/2024 127 (H)  74 - 99 mg/dL Final    POCT Glucose 08/13/2024 112 (H)  74 - 99 mg/dL Final    POCT Glucose 08/13/2024 106 (H)  74 - 99 mg/dL Final    Phosphorus 08/14/2024 2.1 (L)  2.5 - 4.9 mg/dL Final    The performance characteristics of phosphorus testing in heparinized plasma have been validated by the individual  laboratory site where testing is performed. Testing on heparinized plasma is not approved by the FDA; however, such approval is not necessary.    POCT Glucose 08/13/2024 91  74 - 99 mg/dL Final    Glucose 08/14/2024 78  74 - 99 mg/dL Final    Sodium 08/14/2024 146 (H)  136 - 145 mmol/L Final    Potassium 08/14/2024 4.6  3.5 - 5.3 mmol/L Final    Chloride 08/14/2024 107  98 - 107 mmol/L Final    Bicarbonate 08/14/2024 27  21 - 32 mmol/L Final    Anion Gap 08/14/2024 17  10 - 20 mmol/L Final    Urea Nitrogen 08/14/2024 12  6 - 23 mg/dL Final    Creatinine 08/14/2024 0.59  0.50 - 1.30 mg/dL Final    eGFR 08/14/2024 >90  >60 mL/min/1.73m*2 Final    Calculations of estimated GFR are performed using the 2021 CKD-EPI Study Refit equation without the race variable for the IDMS-Traceable creatinine methods.  https://jasn.asnjournals.org/content/early/2021/09/22/ASN.2848741361     Calcium 08/14/2024 8.7  8.6 - 10.3 mg/dL Final    Albumin 08/14/2024 2.8 (L)  3.4 - 5.0 g/dL Final    Alkaline Phosphatase 08/14/2024 118  33 - 120 U/L Final    Total Protein 08/14/2024 6.4  6.4 - 8.2 g/dL Final    AST 08/14/2024 25  9 - 39 U/L Final    Bilirubin, Total 08/14/2024 0.8  0.0 - 1.2 mg/dL Final    ALT 08/14/2024 23  10 - 52 U/L Final    Patients treated with Sulfasalazine may generate falsely decreased results for ALT.    Magnesium 08/14/2024 2.10  1.60 - 2.40 mg/dL Final    WBC 08/14/2024 25.5 (H)  4.4 - 11.3 x10*3/uL Final    nRBC 08/14/2024 0.0  0.0 - 0.0 /100 WBCs Final    RBC 08/14/2024 4.41 (L)  4.50 - 5.90 x10*6/uL Final    Hemoglobin 08/14/2024 11.8 (L)  13.5 - 17.5 g/dL Final    Hematocrit 08/14/2024 36.4 (L)  41.0 - 52.0 % Final    MCV 08/14/2024 83  80 - 100 fL Final    MCH 08/14/2024 26.8  26.0 - 34.0 pg Final    MCHC 08/14/2024 32.4  32.0 - 36.0 g/dL Final    RDW 08/14/2024 14.8 (H)  11.5 - 14.5 % Final    Platelets 08/14/2024 238  150 - 450 x10*3/uL Final    POCT Glucose 08/13/2024 93  74 - 99 mg/dL Final    POCT Glucose 08/14/2024 89  74 - 99 mg/dL Final    POCT Glucose 08/14/2024 79  74 - 99 mg/dL Final    POCT Glucose 08/14/2024 78  74 - 99 mg/dL Final         Imaging     FL small bowel series  Narrative: Interpreted By:  Marvin Tejada,   STUDY:  FL SMALL BOWEL SERIES;  8/13/2024 2:57 pm      INDICATION:  Signs/Symptoms:SBO.      COMPARISON:  CT prior day.      ACCESSION NUMBER(S):  [id number]      ORDERING CLINICIAN:  JAIME MCLAUGHLIN      TECHNIQUE:  Water soluble small bowel follow-through.      FINDINGS:   view demonstrates a nasogastric tube terminating in the mid  stomach. Proximal small bowel dilatation has decreased compared to  prior exam.      From the 0 minute to the 60 minute film, contrast was noted to  opacified mildly dilated loops of proximal small bowel. Contrast  opacified loops of bowel across the expected transition point in the  right mid abdomen between the  60 and 90 minutes films. Contrast was  noted in the ascending colon on the 150 minutes film. Small bowel  transit time is approximately 2 hours.      Impression: Previous findings of small bowel obstruction have mostly resolved.  Persisting mild proximal small bowel dilatation with delayed transit  of contrast across the transition point could reflect mild residual  partial obstruction.      MACRO:  None      Signed by: Marvin Tejada 8/13/2024 3:12 PM  Dictation workstation:   [id number]       Patient Active Problem List   Diagnosis    Other fatigue    Intestinal obstruction, unspecified cause, unspecified whether partial or complete (Multi)    Acute hypoxic respiratory failure (Multi)    Aspiration pneumonia of both lungs due to gastric secretions (Multi)    Hyponatremia    Transaminitis    Leukocytosis    Sepsis with acute hypoxic respiratory failure (Multi)    Constipation         Assessment/Plan   Assessment & Plan  Intestinal obstruction, unspecified cause, unspecified whether partial or complete (Multi)  Patient is doing better currently continue to monitor his bowel regimen  Acute hypoxic respiratory failure (Multi)  Related to aspiration pneumonia continue to be on oxygen and IV antibiotics white count today is 19 will monitor  Aspiration pneumonia of both lungs due to gastric secretions (Multi)    Hyponatremia  Repeat labs on the patient  Transaminitis  Follow-up liver function testing  Leukocytosis  Continue to monitor blood culture and his white count  Sepsis with acute hypoxic respiratory failure (Multi)    Constipation    Record was reviewed from ICU         I spent 45 minutes in the professional and overall care of this patient.      KARYNA Husain MD

## 2024-08-14 NOTE — NURSING NOTE
1020 pt cleaned d/t incontinent of stool, large amt of watery stool. Pt repositioned for for safety and comfort. Bed alarm active. Call light w/I reach.  1025 Dr Almeida at bedside.   1215 Notified Eduardo pt did well with eating jello and italian ice though some concern with thin liquids as he did choke a few times. Pt to be fed with aspiration precautions. SLP eval expected.   1500 pt tolerating thickened liquids w/o s/s of aspiration.   1558 notified Eduardo pt BP is 169/87  1830 pt ate all of his jello for dinner. Chicken broth thickened, pt did not care much for that. Pt does love thickened apple juice. Liquids given via spoon as pt is a feed.   1850 pt cleaned d/t incontinent of stool, new bedding and gown applied. New purewik applied.

## 2024-08-14 NOTE — PROGRESS NOTES
Maximo Pablo  [id number]   1965       Subjective/      Seen in follow-up of partial small bowel obstruction.    Small bowel study yesterday demonstrated no obstruction.  NG tube removed.    Nursing staff reports patient has had multiple loose stools                  Heart Rate:  []   Temp:  [36.7 °C (98.1 °F)-37.3 °C (99.1 °F)]   Resp:  [12-35]   BP: (133-174)/(67-98)   Weight:  [76.1 kg (167 lb 11.2 oz)]   SpO2:  [81 %-99 %]     Intake/Output Summary (Last 24 hours) at 8/14/2024 1024  Last data filed at 8/14/2024 0547  Gross per 24 hour   Intake 1635 ml   Output --   Net 1635 ml          Physical Exam  Abdominal:      Comments: Soft and nontender    Nondistended                Results for orders placed or performed during the hospital encounter of 08/11/24 (from the past 24 hour(s))   POCT GLUCOSE   Result Value Ref Range    POCT Glucose 106 (H) 74 - 99 mg/dL   POCT GLUCOSE   Result Value Ref Range    POCT Glucose 91 74 - 99 mg/dL   POCT GLUCOSE   Result Value Ref Range    POCT Glucose 93 74 - 99 mg/dL   POCT GLUCOSE   Result Value Ref Range    POCT Glucose 89 74 - 99 mg/dL   POCT GLUCOSE   Result Value Ref Range    POCT Glucose 79 74 - 99 mg/dL   Phosphorus   Result Value Ref Range    Phosphorus 2.1 (L) 2.5 - 4.9 mg/dL   Comprehensive Metabolic Panel   Result Value Ref Range    Glucose 78 74 - 99 mg/dL    Sodium 146 (H) 136 - 145 mmol/L    Potassium 4.6 3.5 - 5.3 mmol/L    Chloride 107 98 - 107 mmol/L    Bicarbonate 27 21 - 32 mmol/L    Anion Gap 17 10 - 20 mmol/L    Urea Nitrogen 12 6 - 23 mg/dL    Creatinine 0.59 0.50 - 1.30 mg/dL    eGFR >90 >60 mL/min/1.73m*2    Calcium 8.7 8.6 - 10.3 mg/dL    Albumin 2.8 (L) 3.4 - 5.0 g/dL    Alkaline Phosphatase 118 33 - 120 U/L    Total Protein 6.4 6.4 - 8.2 g/dL    AST 25 9 - 39 U/L    Bilirubin, Total 0.8 0.0 - 1.2 mg/dL    ALT 23 10 - 52 U/L   Magnesium   Result Value Ref Range    Magnesium 2.10 1.60 - 2.40 mg/dL   CBC   Result Value Ref Range    WBC 25.5 (H)  4.4 - 11.3 x10*3/uL    nRBC 0.0 0.0 - 0.0 /100 WBCs    RBC 4.41 (L) 4.50 - 5.90 x10*6/uL    Hemoglobin 11.8 (L) 13.5 - 17.5 g/dL    Hematocrit 36.4 (L) 41.0 - 52.0 %    MCV 83 80 - 100 fL    MCH 26.8 26.0 - 34.0 pg    MCHC 32.4 32.0 - 36.0 g/dL    RDW 14.8 (H) 11.5 - 14.5 %    Platelets 238 150 - 450 x10*3/uL   POCT GLUCOSE   Result Value Ref Range    POCT Glucose 78 74 - 99 mg/dL        I reviewed the above studies      FL small bowel series    Result Date: 8/13/2024  Interpreted By:  Marvin Tejada, STUDY: FL SMALL BOWEL SERIES;  8/13/2024 2:57 pm   INDICATION: Signs/Symptoms:SBO.   COMPARISON: CT prior day.   ACCESSION NUMBER(S): [id number]   ORDERING CLINICIAN: JAIME MCLAUGHLIN   TECHNIQUE: Water soluble small bowel follow-through.   FINDINGS:  view demonstrates a nasogastric tube terminating in the mid stomach. Proximal small bowel dilatation has decreased compared to prior exam.   From the 0 minute to the 60 minute film, contrast was noted to opacified mildly dilated loops of proximal small bowel. Contrast opacified loops of bowel across the expected transition point in the right mid abdomen between the 60 and 90 minutes films. Contrast was noted in the ascending colon on the 150 minutes film. Small bowel transit time is approximately 2 hours.       Previous findings of small bowel obstruction have mostly resolved. Persisting mild proximal small bowel dilatation with delayed transit of contrast across the transition point could reflect mild residual partial obstruction.   MACRO: None   Signed by: Marvin Tejada 8/13/2024 3:12 PM Dictation workstation:   [id number]    ECG 12 Lead    Result Date: 8/12/2024  Sinus tachycardia Possible Left atrial enlargement Cannot rule out Anterior infarct , age undetermined Abnormal ECG When compared with ECG of 14-OCT-2023 17:22, Minimal criteria for Anterior infarct are now Present ST no longer depressed in Inferior leads ST no longer elevated in Lateral leads Nonspecific T  wave abnormality no longer evident in Inferior leads    XR abdomen 1 view    Result Date: 8/12/2024  Interpreted By:  Tommy Colon, STUDY: XR ABDOMEN 1 VIEW;  8/12/2024 5:45 am   INDICATION: Signs/Symptoms:Ngt replaced.   COMPARISON: 08/12/2024   ACCESSION NUMBER(S): [id number]   ORDERING CLINICIAN: OMAR HERRERA   FINDINGS: NG/OG tube terminates in the gastric body. There is persistent dilatation of small bowel loops up to 6 cm in the central abdomen.   No evidence of free intraperitoneal air.   No pneumatosis or portal venous gas identified.   No acute osseous abnormalities.   Left basilar consolidation representing known lingular airspace disease on CT abdomen/pelvis.       NG/OG tube terminates in the gastric body. There is persistent dilatation of small bowel loops up to 6 cm in the central abdomen.   Redemonstration of lingular pneumonia/aspiration pneumonitis.   MACRO: None.   Signed by: Tommy Colon 8/12/2024 5:47 AM Dictation workstation:   [id number]    XR abdomen 1 view    Result Date: 8/12/2024  Interpreted By:  Melissa Adkins, STUDY: XR ABDOMEN 1 VIEW;  8/12/2024 3:43 am   INDICATION: Signs/Symptoms:POST NG TUBE ASSESMENT. PLEASE CALL MICAH Bradford, to confirm it is inserted.   COMPARISON: Abdominal x-rays 08/09/2024 CT scan of the abdomen pelvis 08/12/2020   ACCESSION NUMBER(S): [id number]   ORDERING CLINICIAN: NADJA SAEED   FINDINGS: Study is limited secondary to patient positioning. Enteric tube is seen to the level of the lower thoracic esophagus with side hole above the GE junction. This needs to be advanced slightly.   The right lateral abdomen and pelvis are excluded from the field of view limiting evaluation. Gaseous distended dilated loops of small bowel are noted in the upper abdomen measuring up to 5.5 cm consistent with small bowel obstruction as noted on CT. Limited evaluation of pneumoperitoneum on supine imaging. No pneumatosis or portal venous gas.  No abnormal  calcifications.   Left basilar airspace opacities again noted.   Osseous structures demonstrate no acute bony changes.       1. Enteric tube is noted in the distal esophagus with side hole above the GE junction. Consider advancement and repeat imaging. 2. Multiple dilated loops of small bowel are again visualized consistent with small bowel obstruction as noted on CT. Please refer to separate report of CT for additional detail.   MACRO: None   Signed by: Melissa Adkins 8/12/2024 4:01 AM Dictation workstation:   [id number]    XR chest 1 view    Result Date: 8/12/2024  Interpreted By:  Finkelstein, Evan, STUDY: XR CHEST 1 VIEW;  8/12/2024 2:14 am   INDICATION: Signs/Symptoms:s/p ng tube placement, concern for aspiration.   COMPARISON: Chest radiograph 08/09/2024. CT abdomen pelvis 08/12/2024   ACCESSION NUMBER(S): [id number]   ORDERING CLINICIAN: NADJA SAEED   FINDINGS: NG tube courses just below the diaphragm terminating at midline in the upper abdomen. Side hole at the level of the mid esophagus.   CARDIOMEDIASTINAL SILHOUETTE: Cardiomediastinal silhouette is normal in size and configuration.   LUNGS: Low lung volumes with bronchovascular crowding. Bibasilar opacities. No pleural effusion or pneumothorax.   ABDOMEN: Prominent lucencies underlying the diaphragms bilaterally, likely corresponding to air in the stomach and colon anterior to the liver as seen on earlier same day CT abdomen pelvis.   BONES: No acute osseous abnormality.       NG tube courses just below the diaphragm terminating midline likely near the GE junction. Side hole overlying the mid esophagus. Recommend advancement. Low lung volumes with bronchovascular crowding. Bibasilar opacities may represent atelectasis with aspiration/pneumonia not excluded in the appropriate clinical setting.   MACRO: None.   Signed by: Evan Finkelstein 8/12/2024 3:03 AM Dictation workstation:   [id number]    CT abdomen pelvis wo IV contrast    Result Date:  8/12/2024  Interpreted By:  Tommy Colon, STUDY: CT ABDOMEN PELVIS WO IV CONTRAST;  8/12/2024 12:56 am   INDICATION: Signs/Symptoms:abd pain.   COMPARISON: 08/09/2024 CT abdomen/pelvis and 08/09/2022 CT abdomen/pelvis   ACCESSION NUMBER(S): [id number]   ORDERING CLINICIAN: NADJA SAEED   TECHNIQUE: Contiguous axial images of the abdomen and pelvis were obtained without intravenous contrast. Coronal and sagittal reformatted images were reconstructed from the axial data.   FINDINGS: Note: The absence of intravenous contrast limits evaluation of the solid organs and vasculature.   LOWER CHEST: There new consolidation in the lingula. There are patchy atelectatic and ground-glass opacities in the lower lobes.     ABDOMEN/PELVIS:   ABDOMINAL WALL: There is a small fat containing umbilical hernia and laxity of the linea alba.   LIVER: The liver demonstrates a normal noncontrast attenuation.   BILE DUCTS: No significant intrahepatic or extrahepatic dilatation.   GALLBLADDER: No significant abnormality.   PANCREAS: No significant abnormality.   SPLEEN: No significant abnormality.   ADRENALS: No significant abnormality.   KIDNEYS, URETERS, BLADDER:  No nephroureterolithiasis or hydroureteronephrosis. A new Robles catheter in the bladder. There is new perivesical inflammatory fat stranding suspicious for cystitis.   REPRODUCTIVE ORGANS: The prostate gland is enlarged, measuring 5.5 cm in transverse dimension.   VESSELS: No significant abnormality.   RETROPERITONEUM/LYMPH NODES: No enlarged lymph nodes. No acute retroperitoneal abnormality.   BOWEL/MESENTERY/PERITONEUM: The stomach is severely dilated with air-fluid level and demonstrates gastroesophageal reflux into the distal esophagus extending proximally beyond the scan range. The esophageal wall is diffusely thickened. There is new dilatation of the duodenum extending into the jejunum. There significantly degraded jejunal dilatation compared to prior study,  measuring > 5 cm. There are air-fluid levels throughout the dilated jejunum. There again a relatively gradual transition point now located in the right lower quadrant which this loop demonstrates diffuse wall thickening and perienteric edema/vascular engorgement. The on this the small bowel is decompressed.   There is again diffuse rectal wall thickening, appearing similar to progressed from prior study which may be due to differences in distension or proctitis/stercoral colitis.     MUSCULOSKELETAL: No acute osseous abnormality. No suspicious osseous lesion.       1. Significant worsening of small bowel obstruction. Specifically, there is severe gastric dilatation and air-fluid level, large volume gastroesophageal reflux, new diffuse duodenal dilatation, and significantly greater severity and length of remaining small bowel dilatation with air-fluid levels, > 5 cm. Transition point in the right lower quadrant, with relatively gradual narrowing to decompressed caliber, in which again the bowel demonstrates diffuse wall thickening. Recommend surgical consultation and NG tube placement.   2. Aspiration pneumonia in the lingula secondary to large volume gastroesophageal reflux from high-grade bowel obstruction.   3. Diffuse rectal wall thickening, appearing similar to slightly greater compared to prior study. Correlate for proctitis/stercoral colitis. However, because this is new from 2023, recommend follow-up with gastroenterology for colonoscopy evaluation in order to exclude malignancy.   MACRO: Tommy Colon discussed the significance and urgency of this critical finding , which was acknowledged by the provider, via [place] with  NADJA SAEED on 8/12/2024 at 1:45 am.  (**-RCF-**) Findings:  See findings.   Signed by: Tommy Colon 8/12/2024 1:45 AM Dictation workstation:   [id number]    CT abdomen pelvis wo IV contrast    Result Date: 8/9/2024  Interpreted By:  Tommy Colon, STUDY: CT ABDOMEN  PELVIS WO IV CONTRAST;  8/9/2024 8:30 pm   INDICATION: Signs/Symptoms:abnormal xr, rule out obstruction.   COMPARISON: 08/09/2023 CT abdomen/pelvis   ACCESSION NUMBER(S): [id number]   ORDERING CLINICIAN: WILBERT FUNES   TECHNIQUE: Contiguous axial images of the abdomen and pelvis were obtained without intravenous contrast. Coronal and sagittal reformatted images were reconstructed from the axial data.   FINDINGS: Note: The absence of intravenous contrast limits evaluation of the solid organs and vasculature.   LOWER CHEST: No acute abnormality.     ABDOMEN/PELVIS:   ABDOMINAL WALL: There is a small fat containing umbilical hernia and laxity of the linea alba.   LIVER: The liver demonstrates a normal noncontrast attenuation.   BILE DUCTS: No significant intrahepatic or extrahepatic dilatation.   GALLBLADDER: No significant abnormality.   PANCREAS: No significant abnormality.   SPLEEN: No significant abnormality.   ADRENALS: No significant abnormality.   KIDNEYS, URETERS, BLADDER:  No nephroureterolithiasis or hydroureteronephrosis. The bladder wall is normal thickness for degree of distention.   REPRODUCTIVE ORGANS: No significant abnormality.   VESSELS: No significant abnormality.   RETROPERITONEUM/LYMPH NODES: No enlarged lymph nodes. No acute retroperitoneal abnormality.   BOWEL/MESENTERY/PERITONEUM: Stomach and duodenum appear normal. There is dilatation of jejunal loops with air-fluid levels, measuring up to 4 cm. There is gradual tapering of jejunal caliber to normal in the mid abdomen; however, dislocation there is increasing fecalization of the bowel content. The remainder of the small bowel is decompressed. There is a moderate to large diffuse colonic stool burden the less pronounced compared to prior study. The rectum is distended up to 6 cm. There is diffuse rectal wall thickening likely due to chronic stercoral colitis.   No ascites, free air, or fluid collection.     MUSCULOSKELETAL: No acute osseous  "abnormality. No suspicious osseous lesion.       Dilated jejunal loops measuring up to 4 cm air-fluid levels; however, there is relatively gradual transition point to decompressed caliber in the mid abdomen where there is fecalization of the bowel content likely relating to stasis/hypomotility. At this location there is a diffuse wall thickening of the jejunum, nonspecific enteropathy. Therefore, findings could be at most related to a mild/partial or early bowel obstruction.   Diffuse moderate to large colonic stool burden again greatest in the rectum which is distended up to 5.9 cm. There is a rectal wall thickening diffusely with some perirectal stranding likely representing active chronic stercoral colitis given the propensity to have massive fecal impaction (08/09/2023). Recommend follow-up to address causes of bowel hypomotility.     MACRO: None.   Signed by: Tommy Colon 8/9/2024 9:19 PM Dictation workstation:   [id number]    XR abdomen 1 view    Result Date: 8/9/2024  Interpreted By:  Luigi Benites, STUDY: XR CHEST 1 VIEW; XR ABDOMEN 1 VIEW   INDICATION: Signs/Symptoms:Patient is from a group home reported to be \"not himself\" less energy; Signs/Symptoms:Patient reported to have less energy, history of constipation.   COMPARISON: October 14, 2023   ACCESSION NUMBER(S): [id number]; [id number]   ORDERING CLINICIAN: SHERLEY WEBSTER   FINDINGS: Low lung volumes with some minimal basilar atelectasis similar to the prior study. No consolidation, effusion, edema, or pneumothorax.   Numerous dilated bowel loops are again noted but are grossly similar to a previous abdominal exam.   Limited evaluation for air-fluid levels or free air on the supine radiographs.       Multiple dilated small bowel loops. While these are grossly similar to a previous examination of August 10, 2023, the possibility of a component of bowel obstruction is not excluded. Correlation with the patient's clinical symptomatology and " "presentation can determine the need for CT of the abdomen and pelvis for further evaluation.   No evidence of acute intrathoracic abnormality.   MACRO: Critical Finding:  See findings. Notification was initiated on 8/9/2024 at 5:41 pm by  Luigi Benites.  (**-YCF-**) Instructions:   Signed by: Luigi Benites 8/9/2024 5:41 PM Dictation workstation:   [id number]    XR chest 1 view    Result Date: 8/9/2024  Interpreted By:  Luigi Benites, STUDY: XR CHEST 1 VIEW; XR ABDOMEN 1 VIEW   INDICATION: Signs/Symptoms:Patient is from a group home reported to be \"not himself\" less energy; Signs/Symptoms:Patient reported to have less energy, history of constipation.   COMPARISON: October 14, 2023   ACCESSION NUMBER(S): [id number]; [id number]   ORDERING CLINICIAN: SHERLEY WEBSTER   FINDINGS: Low lung volumes with some minimal basilar atelectasis similar to the prior study. No consolidation, effusion, edema, or pneumothorax.   Numerous dilated bowel loops are again noted but are grossly similar to a previous abdominal exam.   Limited evaluation for air-fluid levels or free air on the supine radiographs.       Multiple dilated small bowel loops. While these are grossly similar to a previous examination of August 10, 2023, the possibility of a component of bowel obstruction is not excluded. Correlation with the patient's clinical symptomatology and presentation can determine the need for CT of the abdomen and pelvis for further evaluation.   No evidence of acute intrathoracic abnormality.   MACRO: Critical Finding:  See findings. Notification was initiated on 8/9/2024 at 5:41 pm by  Luigi Benites.  (**-YCF-**) Instructions:   Signed by: Luigi Benites 8/9/2024 5:41 PM Dictation workstation:   [id number]       I have reviewed the images and the reports        Assessment/      No obstruction        Plan/    Restart oral feedings      All Almeida MD   "

## 2024-08-14 NOTE — PROGRESS NOTES
Subjective  Patient lying in bed in NAD.  Bowel regimen broadened with results.  SBFT noting nearly resolved SBO. NG removed.  Bowel sounds noted.  No abdominal tenderness to palpation. Plan to continue bowel regimen and will consider Linzess for management at discharge.    Objective  Blood pressure 166/84, pulse 77, temperature 37.2 °C (99 °F), temperature source Temporal, resp. rate 18, weight 76.1 kg (167 lb 11.2 oz), SpO2 98%.    Physical Exam  Constitutional: Alert, nonverbal, minimally interactive, in NAD  Eyes: PERRL, sclera clear, no conjunctival injection  Skin: Warm and dry, no rash or ecchymosis  ENMT: Mucous membranes moist, no lesions noted  Resp: CTAB, even and unlabored  CV: RRR, normal S1, S2, no m,r,g  GI: +BS, soft, round, NT, no rebound tenderness or guarding, no palpable masses or organomegaly  Extremities: Extremities warm, no edema, contusions, wounds or cyanosis, UE contractures  Neuro:LEO  Psych: Flat affect    Medications  Scheduled medications  [Held by provider] amLODIPine, 10 mg, oral, Daily  [Held by provider] aspirin, 81 mg, oral, Daily  bisacodyl, 10 mg, rectal, q24h MEAGHAN  chlorhexidine, 15 mL, Mouth/Throat, BID  [Held by provider] cloNIDine, 0.2 mg, oral, BID  enoxaparin, 40 mg, subcutaneous, q24h  [Held by provider] hydroCHLOROthiazide, 12.5 mg, oral, Daily  [Held by provider] labetalol, 100 mg, oral, BID  lacosamide, 100 mg, intravenous, q12h  levETIRAcetam, 500 mg, intravenous, q12h  [Held by provider] lisinopril, 20 mg, oral, BID  [Held by provider] OXcarbazepine, 450 mg, oral, BID  pantoprazole, 40 mg, intravenous, Daily before breakfast  piperacillin-tazobactam, 3.375 g, intravenous, q8h  sodium phosphate, 15 mmol, intravenous, Once  sodium phosphates, 1 enema, rectal, Daily      Continuous medications  lactated Ringer's, 75 mL/hr, Last Rate: 75 mL/hr (08/14/24 0547)  lactated Ringer's, 75 mL/hr, Last Rate: 75 mL/hr (08/14/24 0547)      PRN medications  PRN medications: dextrose,  "dextrose, glucagon, glucagon, ondansetron, oxygen     Labs  Lab Results   Component Value Date    WBC 25.5 (H) 08/14/2024    HGB 11.8 (L) 08/14/2024    HCT 36.4 (L) 08/14/2024    MCV 83 08/14/2024     08/14/2024     Lab Results   Component Value Date    GLUCOSE 78 08/14/2024    CALCIUM 8.7 08/14/2024     (H) 08/14/2024    K 4.6 08/14/2024    CO2 27 08/14/2024     08/14/2024    BUN 12 08/14/2024    CREATININE 0.59 08/14/2024     Lab Results   Component Value Date    ALT 23 08/14/2024    AST 25 08/14/2024    ALKPHOS 118 08/14/2024    BILITOT 0.8 08/14/2024     No results found for: \"IRON\", \"TIBC\", \"FERRITIN\"  Lab Results   Component Value Date    INR 1.1 10/14/2023    INR 1.1 01/13/2022    PROTIME 12.1 10/14/2023    PROTIME 12.3 01/13/2022       Radiology  SBFT 8/13/2024 noting:  IMPRESSION:  Previous findings of small bowel obstruction have mostly resolved.  Persisting mild proximal small bowel dilatation with delayed transit  of contrast across the transition point could reflect mild residual  partial obstruction.      Signed by: Marvin Tejada 8/13/2024 3:12 PM  Dictation workstation:   [id number]    Assessment  Maximo Pablo is a 59 y.o. male with PMH of cerebral palsy, and MRDD-nonverbal, HTN, epilepsy, prior SBO who presents from group home for vomiting.  GI consult for stercoral colitis, SBO.  CT A/P consistent with worsening SBO, large volume reflux esophagitis, new diffuse duodenal dilatation, transition point in right lower quadrant, aspiration pneumonia, and proctitis/stercoral colitis.  Surgery also following.  NG had been in place for decompression and has since been removed as yesterday's SBFT noted near oral solution of obstruction.    Patient having multiple bowel movements with aggressive bowel regimen.  No signs of abdominal discomfort, no grimacing.    # Partial SBO- improving  # proctitis, stercoral colitis  # chronic constipation- improving  # MRDD, CP- nonverbal  # " leukocytosis    Plan:  - continue supportive care  - can restart oral feedings per surgery recs  - continue aggressive bowel regimen  - continue to monotir and record stool output and consistency  - can add linzess for constipation at discharge    Plan has been discussed with Dr. Costello. GI will continue to follow.     Aislinn Henry, APRN/CNP

## 2024-08-14 NOTE — PROGRESS NOTES
Speech-Language Pathology    SLP Adult Inpatient Speech-Language Pathology Clinical Swallow Evaluation    Patient Name: Maximo Pablo  MRN: [id number]  Today's Date: 8/13/2024  Time Calculation  Start Time: 1822  Stop Time: 1832  Time Calculation (min): 10 min      Current Problem:   1. Intestinal obstruction, unspecified cause, unspecified whether partial or complete (Multi)          Recommendations:  Diet: NPO with alternate means nutrition/hydration  Medication Administration: Non oral  Strategies: Sit upright (at least 30 degrees) at all times; frequent, aggressive oral care to improve infection control as well as reduce dental plaque and bacteria on oropharyngeal surfaces (which may increase the risk nosocomial infections, including pneumonia)    Swallow Reassessment    Assessment:  Consistencies Trialed: Puree; Thin Liquids   Oral motor exam: Unable to assess-pt could not comprehend (vs unable to execute) oral motor directives. Volitional swallow/cough could not be assessed as a result. Missing dentition noted upper and lower.   Pt with moderate to severe oral dysphagia upon presentation of single ice chips, characterized by anterior spillage x1 ice chip, along with significant anterior spillage saliva midline to L. Bolus manipulation/mastication/A-P propulsion was WFL given the remaining 2 ice chips. Anterior spillage of a mix of puree with saliva occurred x1 out of 2 boluses puree.  Pharyngeal dysphagia suspect, characterized by inconsistent ability to initiate pharyngeal swallow with single ice chips. However, pt was able to initiate a pharyngeal swallow upon presentation of 2 out of 3 ice chips. Throat clearing, eyes watering with production of a tear L noted when presented with 1 out of 2 boluses puree. No S/S pharyngeal dysphagia evident with the 2nd bolus puree.   Unable to assess orientation 2/2 cognitive communication status (i.e., pt nonverbal).   Pt is considered to be at risk for aspiration at this  "time.   Per the results of this assessment, patient is appropriate tor PO at this time. Please refer to recommendations and precautions as noted above.   ST to continue to follow patient during inpatient stay.      Plan:   Skilled speech therapy for dysphagia treatment is warranted for ongoing assessment of oropharyngeal swallow function, to ensure tolerance of safest and least restrictive consistencies, to provide training and instruction regarding the use of compensatory swallow strategies and pt/caregiver education in order to reduce risk of aspiration, dehydration and malnutrition.  Frequency: 3x/wk  Duration: Current admission   SLP Discharge Recommendations: SNF     Goals:  1. Patient will tolerate PO with no overt s/s of aspiration in 90% of observed therapeutic trials.  2. Patient/caregiver will implement safe swallowing strategies to reduce risk of aspiration in 90% of trials given caregiver assistance/cueing as needed.  3. Determine candidacy for a modified barium swallow study (MBSS) for objective assessment of swallowing function, to assess for aspiration, and to guide further recommendations and treatment plan.     Subjective:  Pt seen at bedside for clinical swallow evaluation. He was assisted into an upright position for PO trials. Pt with an opened mouth posture with anterior spillage saliva L and wet/gurgly respirations, indicating difficulty with management of saliva. Eye contact was variant. Unable to assess vocal quality d/t nonverbal status. Noted congested throat clearing during oral care.      Baseline Assessment:  O2 use: 1.5L via NC     General Visit Information:  Clinical Swallow Evaluation ordered d/t concern for dysphagia. Current diet level is NPO except ice chips.   Baseline diet at group home per EMR is \"puree with HONEY thick liquids and fed by spoon with double portions for dinner. Pt usually fed self and ate well; if tired, staff then fed pt. Pt's meds were crushed and mixed in " "yogurt or pudding.\"    RN Harrison reports no S/S dysphagia at this time.       History of Present Illness:  Per EMR, \"Maximo Pablo is a 59 y.o. male with PMH of cerebral palsy, MRDD nonverbal at baseline, HTN, epilepsy, and SBO from group home who presented to [facility] ED for vomiting. Of note, he was seen 8/9 for decreased energy and constipation. CT at that time showed mild/partial or early bowel obstruction, diffuse moderate to large stool burden, and stercoral colitis. He was discharged with GI follow up. CT abd/pelvis showed large volume gastroesophageal reflux and aspiration pneumonia. CXR showed low lung volumes with bronchovascular crowding, bibasilar opacities. KUB showed NGT in esophagus.   Per Dr. Almeida NG tube can be discontinued and patient can be trialed on ice chips.    Working diagnosis at the time of transfer to ICU: partial small bowel obstruction, hypoxic respiratory failure secondary to aspiration pneumonia versus pneumonitis  Restart patient's p.o. meds including transitioning patient back to home    Mental Status: Mental status is at baseline.     Past Medical History:     History of dysphagia.\"    Imaging:   XR chest 1 view     Result Date: 8/12/2024  Interpreted By:  Finkelstein, Evan, STUDY: XR CHEST 1 VIEW;  8/12/2024 2:14 am   INDICATION: Signs/Symptoms:s/p ng tube placement, concern for aspiration.   COMPARISON: Chest radiograph 08/09/2024. CT abdomen pelvis 08/12/2024   ACCESSION NUMBER(S): [id number]   ORDERING CLINICIAN: NADJA SAEED   FINDINGS: NG tube courses just below the diaphragm terminating at midline in the upper abdomen. Side hole at the level of the mid esophagus.   CARDIOMEDIASTINAL SILHOUETTE: Cardiomediastinal silhouette is normal in size and configuration.   LUNGS: Low lung volumes with bronchovascular crowding. Bibasilar opacities. No pleural effusion or pneumothorax.   ABDOMEN: Prominent lucencies underlying the diaphragms bilaterally, likely corresponding to air in " "the stomach and colon anterior to the liver as seen on earlier same day CT abdomen pelvis.   BONES: No acute osseous abnormality.        NG tube courses just below the diaphragm terminating midline likely near the GE junction. Side hole overlying the mid esophagus. Recommend advancement. Low lung volumes with bronchovascular crowding. Bibasilar opacities may represent atelectasis with aspiration/pneumonia not excluded in the appropriate clinical setting.   MACRO: None.   Signed by: Evan Finkelstein 8/12/2024 3:03 AM Dictation workstation:   [id number].\"    Pain:  CPOT=+2. Pt unable to self report pain 2/2 nonverbal status. Rare grimacing noted; otherwise, pt with no S/S pain or distress. RN informed.      Education:   Learner pt; RN  Barriers to Learning Acuity of illness; cognitive communication limitations  Method demonstration; verbal; visual  Education-Topic SLP provided patient education regarding role of SLP, purpose of assessment and clinical impressions/recommendations. Patient verbalized questionable full comprehension, consistent with cognitive status. SLP further coordinated with RN regarding recommendations and precautions per this assessment, with RN verbalizing understanding.  Outcome: Needs review and reinforcement   "

## 2024-08-15 ENCOUNTER — APPOINTMENT (OUTPATIENT)
Dept: RADIOLOGY | Facility: HOSPITAL | Age: 59
End: 2024-08-15
Payer: MEDICAID

## 2024-08-15 LAB
ALBUMIN SERPL BCP-MCNC: 2.8 G/DL (ref 3.4–5)
ALP SERPL-CCNC: 103 U/L (ref 33–120)
ALT SERPL W P-5'-P-CCNC: 22 U/L (ref 10–52)
ANION GAP SERPL CALC-SCNC: 11 MMOL/L (ref 10–20)
AST SERPL W P-5'-P-CCNC: 26 U/L (ref 9–39)
BILIRUB SERPL-MCNC: 0.8 MG/DL (ref 0–1.2)
BUN SERPL-MCNC: 8 MG/DL (ref 6–23)
CALCIUM SERPL-MCNC: 8.7 MG/DL (ref 8.6–10.3)
CHLORIDE SERPL-SCNC: 107 MMOL/L (ref 98–107)
CO2 SERPL-SCNC: 30 MMOL/L (ref 21–32)
CREAT SERPL-MCNC: 0.55 MG/DL (ref 0.5–1.3)
EGFRCR SERPLBLD CKD-EPI 2021: >90 ML/MIN/1.73M*2
ERYTHROCYTE [DISTWIDTH] IN BLOOD BY AUTOMATED COUNT: 14.7 % (ref 11.5–14.5)
GLUCOSE BLD MANUAL STRIP-MCNC: 116 MG/DL (ref 74–99)
GLUCOSE BLD MANUAL STRIP-MCNC: 121 MG/DL (ref 74–99)
GLUCOSE BLD MANUAL STRIP-MCNC: 143 MG/DL (ref 74–99)
GLUCOSE BLD MANUAL STRIP-MCNC: 155 MG/DL (ref 74–99)
GLUCOSE SERPL-MCNC: 111 MG/DL (ref 74–99)
HCT VFR BLD AUTO: 36.7 % (ref 41–52)
HGB BLD-MCNC: 11.3 G/DL (ref 13.5–17.5)
MAGNESIUM SERPL-MCNC: 2.06 MG/DL (ref 1.6–2.4)
MCH RBC QN AUTO: 26.2 PG (ref 26–34)
MCHC RBC AUTO-ENTMCNC: 30.8 G/DL (ref 32–36)
MCV RBC AUTO: 85 FL (ref 80–100)
NRBC BLD-RTO: 0 /100 WBCS (ref 0–0)
PHOSPHATE SERPL-MCNC: 2 MG/DL (ref 2.5–4.9)
PLATELET # BLD AUTO: 273 X10*3/UL (ref 150–450)
POTASSIUM SERPL-SCNC: 3.4 MMOL/L (ref 3.5–5.3)
PROT SERPL-MCNC: 6.5 G/DL (ref 6.4–8.2)
RBC # BLD AUTO: 4.32 X10*6/UL (ref 4.5–5.9)
SODIUM SERPL-SCNC: 145 MMOL/L (ref 136–145)
WBC # BLD AUTO: 13.9 X10*3/UL (ref 4.4–11.3)

## 2024-08-15 PROCEDURE — 2020000001 HC ICU ROOM DAILY

## 2024-08-15 PROCEDURE — 2500000005 HC RX 250 GENERAL PHARMACY W/O HCPCS: Performed by: NURSE PRACTITIONER

## 2024-08-15 PROCEDURE — 2500000002 HC RX 250 W HCPCS SELF ADMINISTERED DRUGS (ALT 637 FOR MEDICARE OP, ALT 636 FOR OP/ED): Performed by: NURSE PRACTITIONER

## 2024-08-15 PROCEDURE — 74230 X-RAY XM SWLNG FUNCJ C+: CPT | Performed by: RADIOLOGY

## 2024-08-15 PROCEDURE — 74230 X-RAY XM SWLNG FUNCJ C+: CPT

## 2024-08-15 PROCEDURE — 2500000001 HC RX 250 WO HCPCS SELF ADMINISTERED DRUGS (ALT 637 FOR MEDICARE OP): Performed by: NURSE PRACTITIONER

## 2024-08-15 PROCEDURE — 84100 ASSAY OF PHOSPHORUS: CPT

## 2024-08-15 PROCEDURE — 36415 COLL VENOUS BLD VENIPUNCTURE: CPT | Performed by: NURSE PRACTITIONER

## 2024-08-15 PROCEDURE — 2500000004 HC RX 250 GENERAL PHARMACY W/ HCPCS (ALT 636 FOR OP/ED): Performed by: NURSE PRACTITIONER

## 2024-08-15 PROCEDURE — 99232 SBSQ HOSP IP/OBS MODERATE 35: CPT | Performed by: NURSE PRACTITIONER

## 2024-08-15 PROCEDURE — 80053 COMPREHEN METABOLIC PANEL: CPT | Performed by: NURSE PRACTITIONER

## 2024-08-15 PROCEDURE — 83735 ASSAY OF MAGNESIUM: CPT | Performed by: NURSE PRACTITIONER

## 2024-08-15 PROCEDURE — 92611 MOTION FLUOROSCOPY/SWALLOW: CPT | Mod: GN | Performed by: SPEECH-LANGUAGE PATHOLOGIST

## 2024-08-15 PROCEDURE — 85027 COMPLETE CBC AUTOMATED: CPT | Performed by: NURSE PRACTITIONER

## 2024-08-15 PROCEDURE — 2500000005 HC RX 250 GENERAL PHARMACY W/O HCPCS: Performed by: INTERNAL MEDICINE

## 2024-08-15 PROCEDURE — 82947 ASSAY GLUCOSE BLOOD QUANT: CPT

## 2024-08-15 RX ORDER — LEVETIRACETAM 500 MG/1
500 TABLET ORAL EVERY 12 HOURS
Status: DISCONTINUED | OUTPATIENT
Start: 2024-08-15 | End: 2024-08-16 | Stop reason: HOSPADM

## 2024-08-15 RX ORDER — BISACODYL 10 MG/1
10 SUPPOSITORY RECTAL DAILY PRN
Qty: 30 SUPPOSITORY
Start: 2024-08-15 | End: 2024-09-14

## 2024-08-15 RX ORDER — LEVETIRACETAM 500 MG/1
500 TABLET ORAL
Status: DISCONTINUED | OUTPATIENT
Start: 2024-08-15 | End: 2024-08-15

## 2024-08-15 ASSESSMENT — COGNITIVE AND FUNCTIONAL STATUS - GENERAL
HELP NEEDED FOR BATHING: TOTAL
CLIMB 3 TO 5 STEPS WITH RAILING: TOTAL
CLIMB 3 TO 5 STEPS WITH RAILING: TOTAL
PERSONAL GROOMING: TOTAL
MOBILITY SCORE: 6
DAILY ACTIVITIY SCORE: 6
TURNING FROM BACK TO SIDE WHILE IN FLAT BAD: TOTAL
DAILY ACTIVITIY SCORE: 6
WALKING IN HOSPITAL ROOM: TOTAL
DRESSING REGULAR UPPER BODY CLOTHING: TOTAL
TOILETING: TOTAL
STANDING UP FROM CHAIR USING ARMS: TOTAL
HELP NEEDED FOR BATHING: TOTAL
MOVING TO AND FROM BED TO CHAIR: TOTAL
PERSONAL GROOMING: TOTAL
WALKING IN HOSPITAL ROOM: TOTAL
MOVING FROM LYING ON BACK TO SITTING ON SIDE OF FLAT BED WITH BEDRAILS: TOTAL
EATING MEALS: TOTAL
DRESSING REGULAR LOWER BODY CLOTHING: TOTAL
MOBILITY SCORE: 6
DRESSING REGULAR LOWER BODY CLOTHING: TOTAL
MOVING TO AND FROM BED TO CHAIR: TOTAL
EATING MEALS: TOTAL
TOILETING: TOTAL
DRESSING REGULAR UPPER BODY CLOTHING: TOTAL
STANDING UP FROM CHAIR USING ARMS: TOTAL
TURNING FROM BACK TO SIDE WHILE IN FLAT BAD: TOTAL
MOVING FROM LYING ON BACK TO SITTING ON SIDE OF FLAT BED WITH BEDRAILS: TOTAL

## 2024-08-15 ASSESSMENT — PAIN - FUNCTIONAL ASSESSMENT
PAIN_FUNCTIONAL_ASSESSMENT: WONG-BAKER FACES
PAIN_FUNCTIONAL_ASSESSMENT: FLACC (FACE, LEGS, ACTIVITY, CRY, CONSOLABILITY)

## 2024-08-15 ASSESSMENT — PAIN SCALES - WONG BAKER
WONGBAKER_NUMERICALRESPONSE: NO HURT
WONGBAKER_NUMERICALRESPONSE: NO HURT

## 2024-08-15 NOTE — PROGRESS NOTES
Nutrition Follow Up Assessment:   Nutrition Assessment    Reason for Assessment: Provider consult order    Nutrition Note:  Maximo Pablo is a 59 y.o. male presenting 8/11 from group home with abd distention, constipation, and vomiting. Work up revealing +UA and r/o SBO. NGT placed in ED with reported 1L out; pt with 650mL out since 0700hrs 8/12.     8/15/2024 - Follow up note - NG removed yesterday.  Pt had clear liquids after NG removed and was noted to eat jello well but had trouble eating italian ice.  ST saw pt recommending pureed textures with moderately thick liquids.  Pt taken off floor for MBS today.     Past Medical History  ED 8/8/2024 due to r/o SBO; pt with +BM in ED and sent back to group home.   has a past medical history of Epilepsy, unspecified, not intractable, without status epilepticus (Multi), Hyperlipidemia, unspecified, Personal history of other diseases of the circulatory system, Personal history of other diseases of the nervous system and sense organs, Personal history of other endocrine, nutritional and metabolic disease, and Personal history of other specified conditions.  Surgical History   has a past surgical history that includes Other surgical history (11/26/2019).     Nutrition History:  Energy Intake: Fair 50-75 %, Good > 75 %  Food and Nutrient History: Pt from group home (Aminah RN at [phone]). Per Aminah, pt on puree with HONEY thick liquids and fed by spoon with double portions for dinner. Pt usually fed self and ate well; if tired, staff then fed pt. Pt's meds were crushed and mixed in yogurt or pudding. NKFA. Note pt received Ensure 1x/day/MAR  Vitamin/Herbal Supplement Use: home meds include lactulose, MVI, D3, fleets enema, miralax  Food Allergies/Intolerances:  None  GI Symptoms: None  Oral Problems: Chewing difficulty and Swallowing difficulty       Current Diet: Adult diet Regular; Pureed 4; Moderately thick 3; 1:1 Feeding, Spoon feed only    Anthropometrics:       Weight: 76.1 kg (167 lb 12.8 oz)   BMI (Calculated): 24.77             Weight Change %:       Pain Assessment: FACES      Nutrition Significant Labs:  BMP Trend:   Results from last 7 days   Lab Units 08/15/24  0622 08/14/24  0658 08/13/24  0358 08/12/24  0551   GLUCOSE mg/dL 111* 78 109* 107*   CALCIUM mg/dL 8.7 8.7 8.3* 9.0   SODIUM mmol/L 145 146* 141 140   POTASSIUM mmol/L 3.4* 4.6 3.4* 3.6   CO2 mmol/L 30 27 28 31   CHLORIDE mmol/L 107 107 104 100   BUN mg/dL 8 12 22 22   CREATININE mg/dL 0.55 0.59 0.94 1.02    , A1C:  Lab Results   Component Value Date    HGBA1C 5.2 09/26/2023       Nutrition Specific Medications:  Scheduled medications  amLODIPine, 10 mg, oral, Daily  aspirin, 81 mg, oral, Daily  bisacodyl, 10 mg, rectal, q24h MEAGHAN  chlorhexidine, 15 mL, Mouth/Throat, BID  cloNIDine, 0.2 mg, oral, BID  enoxaparin, 40 mg, subcutaneous, q24h  hydroCHLOROthiazide, 12.5 mg, oral, Daily  labetalol, 100 mg, oral, BID  levETIRAcetam, 500 mg, oral, q12h  lisinopril, 20 mg, oral, BID  OXcarbazepine, 450 mg, oral, BID  pantoprazole, 40 mg, intravenous, Daily before breakfast  piperacillin-tazobactam, 3.375 g, intravenous, q8h  sodium phosphate, 15 mmol, intravenous, Once  sodium phosphates, 1 enema, rectal, Daily      Nutrition Focused Physical Exam Findings:  defer: below from previous assessment  Subcutaneous Fat Loss:   Orbital Fat Pads: Well nourished (slightly bulging fat pads)  Buccal Fat Pads: Well nourished (full, rounded cheeks)  Muscle Wasting:  Temporalis: Well nourished (well-defined muscle)  Pectoralis (Clavicular Region): Well nourished (clavicle not visible)  Edema:  Edema: none  Physical Findings:  Nails: Negative  Skin: Negative (scattered bruising/ no edema)    I/O:   Last BM Date: 08/15/24; Stool Appearance: Loose (08/15/24 0230)     Estimated Needs:   Total Energy Estimated Needs (kCal):  (2000-2300kcal (28-32kcal/kg of 72.6kg))  Total Protein Estimated Needs (g):  (87-109g (1.2-1.5g/kg of  72.6kg))  Total Fluid Estimated Needs (mL):  (1mL/kcal/d or as per physician)          Nutrition Diagnosis   Malnutrition Diagnosis  Patient has Malnutrition Diagnosis: No    Nutrition Diagnosis  Patient has Nutrition Diagnosis: Yes  Diagnosis Status (1): Resolved  Nutrition Diagnosis 1: Inadequate oral intake  Related to (1): altered GI function  As Evidenced by (1): NPO with constipation and r/o SBO; NGT in place.  Additional Nutrition Diagnosis: Diagnosis 2  Diagnosis Status (2): Ongoing  Nutrition Diagnosis 2: Swallowing difficulity  Related to (2): baseline dysphagia  As Evidenced by (2): baseline need for puree level 4 with moderately thick (HONEY) liquids.  Additional Assessment Information (2): 8/12: Case discussed with nurse. NGT output remains high. PT with peripheral access only. Consult received for EN recommendations--which is currently not appropriate; hopeful as GI issues resolved, pt will be able to resume puree level 4 with moderately thick (HONEY thick) liquids.       Nutrition Interventions/Recommendations         Nutrition Prescription:  Individualized Nutrition Prescription Provided for : Continue with diet textures per SLP recommendations, avoid further dietary restrictions to promote oral intakes        Nutrition Interventions:   Food and/or Nutrient Delivery Interventions  Interventions: Meals and snacks, Medical food supplement  Meals and Snacks: Texture-modified diet, General healthful diet  Goal: Continue Pureed/Moderately thick textures  Medical Food Supplement: Commercial food  Goal: Magic Cup 2x/d and PRN for additional 580kcal and 18g pro (for 2) along with Gelatein Plus 2x/d and PRN for additional 320kcal and 40g pro (for 2).    Coordination of Nutrition Care by a Nutrition Professional  Collaboration and Referral of Nutrition Care: Collaboration by nutrition professional with other providers  Goal: MICAH Orellana       Nutrition Monitoring and Evaluation   Food/Nutrient Related History  Monitoring  Monitoring and Evaluation Plan: Energy intake  Energy Intake: Estimated energy intake  Criteria: >75% of estimated nutrient needs via oral diet within next 48-72 hours.    Body Composition/Growth/Weight History  Monitoring and Evaluation Plan: Weight  Weight: Measured weight  Criteria: daily weight    Biochemical Data, Medical Tests and Procedures  Monitoring and Evaluation Plan: Electrolyte/renal panel  Electrolyte and Renal Panel: Magnesium, Phosphorus, Potassium, Sodium  Criteria: lytes WNR    Nutrition Focused Physical Findings  Monitoring and Evaluation Plan: Digestive System  Digestive System: Abdominal distension, Constipation, Vomiting  Criteria: GI symptoms will resolve within next 24-48 hours.       Time Spent/Follow-up Reminder:   Time Spent (min): 45 minutes  Last Date of Nutrition Visit: 08/15/24  Nutrition Follow-Up Needed?: 3-8 days  Follow up Comment: CHRISSIE

## 2024-08-15 NOTE — PROCEDURES
Speech-Language Pathology    Inpatient Modified Barium Swallow Study    Patient Name: Maximo Pablo  MRN: [id number]  : 1965  Today's Date: 08/15/24  Time Calculation  Start Time: 1225  Stop Time: 1237  Time Calculation (min): 12 min          Modified Barium Swallow Study completed. Informed verbal consent obtained prior to completion of exam. Trials of nectar/mildly thick liquid, honey/moderately thick liquid, and puree were given.     SLP: Pamela Dumas CCC-SLP   Contact info: [employer] chat; phone: [phone]      Reason for Referral: Suspected oral or pharyngeal dysphagia  Patient Hx: 59 y.o. male presenting  from group home with abd distention, constipation, and vomiting. Work up revealing +UA and r/o SBO.  MBSS was recommended per clinical swallow evaluation.  Respiratory Status: Room air  Current diet: Puree and moderately thick liquids by spoon    FINAL SPEECH RECOMMENDATIONS    DIET:   DIET RECOMMENDATIONS:   - Pureed (IDDSI Level 4)  - Moderately/honey thick liquids (IDDSI Level 3) BY SPOON      STRATEGIES:  - Small bites  - Upright for all PO intake  - Swallow 2-3 times per bite/sip  - Medications crushed in puree (verify with MD)  - Complete oral care frequently throughout the day  - Supervision/assist with meals    EXERCISES:    Plan:  Treatment/Interventions: Compensatory strategy training, Caregiver education, Diet tolerance  SLP Plan: Skilled SLP warranted  SLP Frequency: 1x/week  Duration: 2 weeks    Discussed POC: Patient  Discussed Risks/Benefits: Yes  Patient/Caregiver Agreeable: Yes    Short term goals established 08/15/24:   1. Patient will tolerate recommended PO diet absent of overt s/s of aspiration in 90% of observed therapeutic trials.  2. Patient will implement safe swallowing strategies to reduce risk of aspiration in 90% of trials given caregiver assistance/cueing as needed.      Education Provided: Results and recommendations per MBSS, with video review;  recommendations and POC at this time. Verbal understanding and agreement given on all accounts.     Treatment Provided Today: ST provided extensive education to pt/pt family regarding anatomy/physiology of swallow function, risk of aspiration/aspiration pna, diet modifications, and the use of compensatory swallow strategies to promote pt safety upon PO intake including **. In addition, ST provided instruction/training on oral and pharyngeal exercises (re: effortful swallow).     Additional consult suggested: None currently    Repeat study/ dc plan: As indicated       Mechanics of the Swallow Summary:  ORAL PHASE:  Lip Closure - Profuse escape through open lips   Tongue Control During Bolus Hold - Unable to complete bolus hold  Bolus prep/mastication - Mastication not assessed   Bolus transport/lingual motion - Repetitive/disorganized tongue motion (tongue pumping)   Oral residue - Residue collection on oral structure     PHARYNGEAL PHASE:  Initiation of pharyngeal swallow - Bolus head at pit of pyriforms   Soft palate elevation - No bolus between soft palate/pharyngeal wall   Laryngeal elevation - Complete superior movement of thyroid cartilage with contact of arytenoids to epiglottic petiole   Anterior hyoid excursion - Complete anterior movement   Epiglottic movement - Complete inversion    Laryngeal vestibule closure - Complete - no air/contrast in laryngeal vestibule   Pharyngeal stripping wave - Complete  Pharyngeal contraction (A/P view) - Not tested       Pharyngoesophageal segment opening - Complete distension and complete duration/no obstruction of flow of bolus   Tongue base retraction - Trace column of contrast or air between tongue base and pharyngeal wall   Pharyngeal residue - Trace residue within or on the pharyngeal structures     ESOPHAGEAL PHASE:  Esophageal clearance - Not evaluated       SLP Impressions with Severity Rating:   Pt presents with moderate oropharyngeal dysphagia (oral phase more  impaired than pharyngeal phase) upon completion of modified barium swallow study this date. Swallowing physiology is detailed above. Impairments most impacting swallowing safety and efficiency include diffuse anterior loss, lingual pumping, and decreased tongue base retraction.. Of note, study was limited by patient's shoulder obstructing view and patient moving head around. Within the limitations of view, no penetration/aspiration was visualized. Patient demonstrated mild-moderate oral and trace-mild pharyngeal residue that was reduced with repeat swallow (however patient was inconsistent in eliciting repeat swallow.    AP view was unable to be completed as study was conducted in bed due to positioning limitations.    Per the results of this assessment, patient to continue baseline modified diet. ST to continue to follow during inpatient stay to ensure diet tolerance, and to provide ongoing education/training in safe swallowing strategies to reduce risk of aspiration      OUTCOME MEASURES:  Functional Oral Intake Scale  Functional Oral Intake Scale: Level 5        total oral diet with multiple consistencies, but requires special preparations and compensations       Rosenbek's Penetration Aspiration Scale  Nectar Thick Liquids: 1. NO ASPIRATION & NO PENETRATION - no aspiration, contrast does not enter airway within limitations of view  Honey Thick Liquids: 1. NO ASPIRATION & NO PENETRATION - no aspiration, contrast does not enter airway within limitations of view  Puree: 1. NO ASPIRATION & NO PENETRATION - no aspiration, contrast does not enter airway within limitations of view

## 2024-08-15 NOTE — CARE PLAN
The patient's goals for the shift include      The clinical goals for the shift include Patient will remain safe no fall or injuries this shift.

## 2024-08-15 NOTE — PROGRESS NOTES
Subjective  Patient is nonverbal  Nursing staff was interviewed  Objectives    Last Recorded Vitals  Blood pressure 101/63, pulse 81, temperature 36.2 °C (97.2 °F), temperature source Temporal, resp. rate 18, weight 76.1 kg (167 lb 12.8 oz), SpO2 95%.    Physical Exam  HENT:      Right Ear: External ear normal.      Left Ear: External ear normal.      Mouth/Throat:      Mouth: Mucous membranes are moist.   Cardiovascular:      Rate and Rhythm: Normal rate and regular rhythm.      Heart sounds: No murmur heard.     No friction rub. No gallop.   Pulmonary:      Effort: No accessory muscle usage or respiratory distress.      Breath sounds: No stridor. No wheezing or rhonchi.   Chest:      Chest wall: No tenderness.   Abdominal:      General: There is no distension.      Palpations: There is no mass.      Tenderness: There is no abdominal tenderness. There is no guarding or rebound.   Musculoskeletal:         General: No deformity or signs of injury.      Cervical back: No rigidity or tenderness. Normal range of motion.      Right lower leg: No edema.      Left lower leg: No edema.   Skin:     Coloration: Skin is not jaundiced or pale.      Findings: No lesion.   Neurological:   Patient is nonverbal alert     Labs    No results displayed because visit has over 200 results.            Imaging     FL small bowel series  Narrative: Interpreted By:  Marvin Tejada,   STUDY:  FL SMALL BOWEL SERIES;  8/13/2024 2:57 pm      INDICATION:  Signs/Symptoms:SBO.      COMPARISON:  CT prior day.      ACCESSION NUMBER(S):  [id number]      ORDERING CLINICIAN:  JAIME MCLAUGHLIN      TECHNIQUE:  Water soluble small bowel follow-through.      FINDINGS:   view demonstrates a nasogastric tube terminating in the mid  stomach. Proximal small bowel dilatation has decreased compared to  prior exam.      From the 0 minute to the 60 minute film, contrast was noted to  opacified mildly dilated loops of proximal small bowel. Contrast  opacified  loops of bowel across the expected transition point in the  right mid abdomen between the 60 and 90 minutes films. Contrast was  noted in the ascending colon on the 150 minutes film. Small bowel  transit time is approximately 2 hours.      Impression: Previous findings of small bowel obstruction have mostly resolved.  Persisting mild proximal small bowel dilatation with delayed transit  of contrast across the transition point could reflect mild residual  partial obstruction.      MACRO:  None      Signed by: Marvin Tejada 8/13/2024 3:12 PM  Dictation workstation:   [id number]       Patient Active Problem List   Diagnosis    Other fatigue    Intestinal obstruction, unspecified cause, unspecified whether partial or complete (Multi)    Acute hypoxic respiratory failure (Multi)    Aspiration pneumonia of both lungs due to gastric secretions (Multi)    Hyponatremia    Transaminitis    Leukocytosis    Sepsis with acute hypoxic respiratory failure (Multi)    Constipation         Assessment/Plan   Assessment & Plan  Intestinal obstruction, unspecified cause, unspecified whether partial or complete (Multi)  Patient is doing better currently continue to monitor his bowel regimen  Acute hypoxic respiratory failure (Multi)  Related to aspiration pneumonia continue to be on oxygen and IV antibiotics white count today is 19 will monitor  Aspiration pneumonia of both lungs due to gastric secretions (Multi)    Hyponatremia  Repeat labs on the patient  Transaminitis  Follow-up liver function testing  Leukocytosis  Continue to monitor blood culture and his white count  Sepsis with acute hypoxic respiratory failure (Multi)    Constipation      Small bowel obstruction looks like resolved patient is doing well having bowel movement and able to eat  Sepsis with aspiration pneumonia white count 13,000 significantly down from yesterday continue with antibiotics anticipate possible return to group home tomorrow       I spent 25 minutes in  the professional and overall care of this patient.      KARYNA Husain MD

## 2024-08-15 NOTE — PROGRESS NOTES
Speech-Language Pathology    SLP Adult Inpatient  Speech-Language Pathology Treatment     Patient Name: Maximo Pablo  MRN: [id number]  Today's Date: 8/14/2024  Time Calculation  Start Time: 1647  Stop Time: 1658  Time Calculation (min): 11 min      Current Problem:   1. Intestinal obstruction, unspecified cause, unspecified whether partial or complete (Multi)          Recommendations:  Diet: Pureed 4 with Moderately Thick 3; 1:1 feed via spoon only  Medication Administration: Crushed in pureed carrier  Strategies: Sit upright in 90 degree position&ensure adequate alertness&maintain upright posture for 30 mins after PO&slow rate&strict oral care&no straws&assistance/supervision with PO&small sips/bites&alternate between bites and sips  Instrumental Evaluation: Modified Barium Swallow Study (MBSS) to assess swallow physiology     Therapeutic Swallow Interventions: Pt/Caregiver Education, PO trials, Compensatory Swallow Techniques     Assessment:  Consistencies Trialed: Puree; Moderately Thick Liquids   Oral phase was WFL with the exception of anterior spillage of a mix of puree with saliva. Pt with a tongue thrust mastication pattern.   Pharyngeal dysphagia suspect, characterized by wet/gurgly respirations noted x2 following moderately thick liquids. No S/S pharyngeal dysphagia evident following puree.    Pt is considered to be at risk for aspiration at this time.   Per the results of this assessment, patient is appropriate tor PO at this time. Please refer to recommendations and precautions as noted above.   ST to continue to follow patient during inpatient stay.      Plan:   Skilled speech therapy for dysphagia treatment is warranted for ongoing assessment of oropharyngeal swallow function, to ensure tolerance of safest and least restrictive consistencies, to provide training and instruction regarding the use of compensatory swallow strategies and pt/caregiver education in order to reduce risk of aspiration,  "dehydration and malnutrition.  Frequency: 3x/wk  Duration: Current admission   SLP Discharge Recommendations: SNF      Goals:  1. Patient will tolerate PO with no overt s/s of aspiration in 90% of observed therapeutic trials.  Status: Progressing. Wet/gurgly respirations following PO trials (Puree and Moderately Thick Liquids was minimal (as compared to Clinical   2. Patient/caregiver will implement safe swallowing strategies to reduce risk of aspiration in 90% of trials given caregiver assistance/cueing as needed.  Status: Progressing No Progress Not Addressed Determine candidacy for a modified barium swallow study (MBSS) for objective assessment of swallowing function, to assess for aspiration, and to guide further recommendations and treatment plan.  Status: Progressing No Progress Not Addressed     Subjective:  Pt seen at bedside for dysphagia tx. He was assisted into an upright position for PO trials. Pt with an opened mouth posture at rest with anterior spillage saliva R. Pt with min wet/gurgly respirations prior to PO, indicating potential difficulty with management of saliva. Eye contact was variant. Vocal quality (for nonsensical vocalizations x3 pt was able to produce) was WFL. Noted during PO that pt pointed to proffered food containers, indicating he would like more.      O2 use: 2L via NC     General Visit Information:  Clinical Swallow Evaluation was completed 24. NPO with alternate means nutrition/hydration was recommended.    Current diet level is Clear Liquid; Moderately Thick 3; spoon feed only.    Baseline diet at group home per EMR is \"puree with HONEY thick liquids and fed by spoon with double portions for dinner. Pt usually fed self and ate well; if tired, staff then fed pt. Pt's meds were crushed and mixed in yogurt or pudding.\"    RN reports improvement from an NPO diet to Moderately Thick 3.     Pain:  PAINAD   Breathin  Negative Vocalization: 0  Facial Expression: 0  Body Language: " 0  Consolability: 0  PAINAD Score: 0      Education:   Learner pt; RN  Barriers to Learning Acuity of illness; cognitive communication limitations  Method demonstration; verbal; visual  Education-Topic SLP provided patient education regarding role of SLP, purpose of assessment and clinical impressions/recommendations. Patient verbalized questionable full comprehension, consistent with cognitive status. SLP further coordinated with RN regarding recommendations and precautions per this assessment, with RN verbalizing understanding.  Outcome: Needs review and reinforcement

## 2024-08-15 NOTE — NURSING NOTE
0900 pt repositioned for safety and comfort. Pt ate 80% of meal for this nurse. No issue or s/s of aspiration with puree and thickened liquids.   1206 pt taken off the floor for MBS.

## 2024-08-15 NOTE — PROGRESS NOTES
Subjective  Patient sitting up in bed in NAD.  Continues to have bowel movements.  SBFT noting nearly resolved SBO. Tolerating diet. No abdominal tenderness to palpation. Plan to continue bowel regimen and will consider Linzess for management at discharge.    Objective  Blood pressure (!) 114/93, pulse 84, temperature 36.7 °C (98.1 °F), temperature source Temporal, resp. rate 16, weight 76.1 kg (167 lb 12.8 oz), SpO2 92%.    Physical Exam  Constitutional: Alert, nonverbal, minimally interactive, in NAD  Eyes: PERRL, sclera clear, no conjunctival injection  Skin: Warm and dry, no rash or ecchymosis  ENMT: Mucous membranes moist, no lesions noted  Resp: CTAB, even and unlabored  CV: RRR, normal S1, S2, no m,r,g  GI: +BS, soft, round, NT, no rebound tenderness or guarding, no palpable masses or organomegaly  Extremities: Extremities warm, no edema, contusions, wounds or cyanosis, extremities contracted  Neuro:LEO  Psych: Flat affect    Medications  Scheduled medications  amLODIPine, 10 mg, oral, Daily  aspirin, 81 mg, oral, Daily  bisacodyl, 10 mg, rectal, q24h MEAGHAN  chlorhexidine, 15 mL, Mouth/Throat, BID  cloNIDine, 0.2 mg, oral, BID  enoxaparin, 40 mg, subcutaneous, q24h  hydroCHLOROthiazide, 12.5 mg, oral, Daily  labetalol, 100 mg, oral, BID  levETIRAcetam, 500 mg, oral, q12h  lisinopril, 20 mg, oral, BID  OXcarbazepine, 450 mg, oral, BID  pantoprazole, 40 mg, intravenous, Daily before breakfast  piperacillin-tazobactam, 3.375 g, intravenous, q8h  sodium phosphate, 15 mmol, intravenous, Once  sodium phosphates, 1 enema, rectal, Daily      Continuous medications       PRN medications  PRN medications: dextrose, dextrose, glucagon, glucagon, ondansetron, oxygen     Labs  Lab Results   Component Value Date    WBC 13.9 (H) 08/15/2024    HGB 11.3 (L) 08/15/2024    HCT 36.7 (L) 08/15/2024    MCV 85 08/15/2024     08/15/2024     Lab Results   Component Value Date    GLUCOSE 111 (H) 08/15/2024    CALCIUM 8.7  "08/15/2024     08/15/2024    K 3.4 (L) 08/15/2024    CO2 30 08/15/2024     08/15/2024    BUN 8 08/15/2024    CREATININE 0.55 08/15/2024     Lab Results   Component Value Date    ALT 22 08/15/2024    AST 26 08/15/2024    ALKPHOS 103 08/15/2024    BILITOT 0.8 08/15/2024     No results found for: \"IRON\", \"TIBC\", \"FERRITIN\"  Lab Results   Component Value Date    INR 1.1 10/14/2023    INR 1.1 01/13/2022    PROTIME 12.1 10/14/2023    PROTIME 12.3 01/13/2022       Radiology  SBFT 8/13/2024 noting:  IMPRESSION:  Previous findings of small bowel obstruction have mostly resolved.  Persisting mild proximal small bowel dilatation with delayed transit  of contrast across the transition point could reflect mild residual  partial obstruction.      Signed by: Marvin Tejada 8/13/2024 3:12 PM  Dictation workstation:   [id number]    Assessment  Maximo Pablo is a 59 y.o. male with PMH of cerebral palsy, and MRDD-nonverbal, HTN, epilepsy, prior SBO who presents from group home for vomiting.  GI consult for stercoral colitis, SBO.  CT A/P consistent with worsening SBO, large volume reflux esophagitis, new diffuse duodenal dilatation, transition point in right lower quadrant, aspiration pneumonia, and proctitis/stercoral colitis.  Surgery also following.  NG placed initially placed, since removed following SBFT noting near resolution of partial obstruction.    Patient continues to have bowel movements, tolerating diet.    # Partial SBO- improving  # proctitis, stercoral colitis  # chronic constipation- improving  # MRDD, CP- nonverbal  # leukocytosis- downtrending    Plan:  - continue supportive care  - continue diet as tolerated  - continue aggressive bowel regimen  - continue daily PPI  - frequent turns and repositioning  - maintain adequate fluid hydration  - continue to monitor and record stool output and consistency  - can add linzess 145 mcg p.o. daily for constipation at discharge  - pt will follow up in GI clinic " and determine plan for colonoscopy    Plan has been discussed with Dr. Costello. GI will sign off.     GERTRUDIS Marrero/CNP

## 2024-08-15 NOTE — NURSING NOTE
Incontinence of bowel and bladder. Cleaned up IV infiltrated restarted to right forearm. Non verbal contracted MRDD. For MBS today.

## 2024-08-16 ENCOUNTER — TELEPHONE (OUTPATIENT)
Dept: GASTROENTEROLOGY | Facility: CLINIC | Age: 59
End: 2024-08-16
Payer: MEDICAID

## 2024-08-16 VITALS
HEART RATE: 77 BPM | WEIGHT: 170.42 LBS | OXYGEN SATURATION: 93 % | DIASTOLIC BLOOD PRESSURE: 85 MMHG | BODY MASS INDEX: 25.17 KG/M2 | SYSTOLIC BLOOD PRESSURE: 134 MMHG | TEMPERATURE: 98.1 F | RESPIRATION RATE: 16 BRPM

## 2024-08-16 PROBLEM — E87.0 HYPERNATREMIA: Status: ACTIVE | Noted: 2023-08-13

## 2024-08-16 PROBLEM — G40.909 EPILEPSY (MULTI): Status: ACTIVE | Noted: 2023-08-13

## 2024-08-16 PROBLEM — G80.9 CEREBRAL PALSY (MULTI): Status: ACTIVE | Noted: 2023-08-13

## 2024-08-16 PROBLEM — R74.01 ELEVATION OF LEVELS OF LIVER TRANSAMINASE LEVELS: Status: ACTIVE | Noted: 2022-09-03

## 2024-08-16 PROBLEM — L23.89 ALLERGIC CONTACT DERMATITIS DUE TO OTHER AGENTS: Status: ACTIVE | Noted: 2023-03-28

## 2024-08-16 PROBLEM — R11.2 NAUSEA AND VOMITING: Status: ACTIVE | Noted: 2024-08-16

## 2024-08-16 PROBLEM — R56.9 SEIZURE (MULTI): Status: ACTIVE | Noted: 2024-08-16

## 2024-08-16 PROBLEM — I24.9 ACUTE CORONARY SYNDROME (MULTI): Status: ACTIVE | Noted: 2022-01-14

## 2024-08-16 PROBLEM — Z86.79 HISTORY OF HYPERTENSION: Status: ACTIVE | Noted: 2024-08-16

## 2024-08-16 PROBLEM — D48.5 NEOPLASM OF UNCERTAIN BEHAVIOR OF SKIN: Status: ACTIVE | Noted: 2022-03-17

## 2024-08-16 PROBLEM — M79.89 SYMPTOM OF LEG SWELLING: Status: ACTIVE | Noted: 2024-08-16

## 2024-08-16 PROBLEM — U07.1 DISEASE DUE TO SEVERE ACUTE RESPIRATORY SYNDROME CORONAVIRUS 2 (SARS-COV-2): Status: ACTIVE | Noted: 2022-09-03

## 2024-08-16 PROBLEM — Z86.69 HISTORY OF BRAIN DISORDER: Status: ACTIVE | Noted: 2024-08-16

## 2024-08-16 PROBLEM — M95.11 CAULIFLOWER RIGHT EAR: Status: ACTIVE | Noted: 2024-08-16

## 2024-08-16 PROBLEM — R50.9 FEVER: Status: ACTIVE | Noted: 2017-09-17

## 2024-08-16 PROBLEM — I10 ESSENTIAL HYPERTENSION: Status: ACTIVE | Noted: 2022-09-03

## 2024-08-16 PROBLEM — R41.82 ALTERED MENTAL STATUS: Status: ACTIVE | Noted: 2024-08-16

## 2024-08-16 PROBLEM — L20.9 ATOPIC DERMATITIS, UNSPECIFIED: Status: ACTIVE | Noted: 2023-03-28

## 2024-08-16 PROBLEM — F73 PROFOUND INTELLECTUAL DISABILITY: Status: ACTIVE | Noted: 2024-08-16

## 2024-08-16 PROBLEM — R21 RASH AND OTHER NONSPECIFIC SKIN ERUPTION: Status: ACTIVE | Noted: 2022-03-17

## 2024-08-16 LAB
ALBUMIN SERPL BCP-MCNC: 2.6 G/DL (ref 3.4–5)
ALP SERPL-CCNC: 94 U/L (ref 33–120)
ALT SERPL W P-5'-P-CCNC: 32 U/L (ref 10–52)
ANION GAP SERPL CALC-SCNC: 11 MMOL/L (ref 10–20)
AST SERPL W P-5'-P-CCNC: 42 U/L (ref 9–39)
BACTERIA BLD CULT: NORMAL
BACTERIA BLD CULT: NORMAL
BILIRUB SERPL-MCNC: 0.7 MG/DL (ref 0–1.2)
BUN SERPL-MCNC: 6 MG/DL (ref 6–23)
CALCIUM SERPL-MCNC: 8.1 MG/DL (ref 8.6–10.3)
CHLORIDE SERPL-SCNC: 104 MMOL/L (ref 98–107)
CO2 SERPL-SCNC: 31 MMOL/L (ref 21–32)
CREAT SERPL-MCNC: 0.63 MG/DL (ref 0.5–1.3)
EGFRCR SERPLBLD CKD-EPI 2021: >90 ML/MIN/1.73M*2
ERYTHROCYTE [DISTWIDTH] IN BLOOD BY AUTOMATED COUNT: 14.7 % (ref 11.5–14.5)
GLUCOSE BLD MANUAL STRIP-MCNC: 111 MG/DL (ref 74–99)
GLUCOSE BLD MANUAL STRIP-MCNC: 115 MG/DL (ref 74–99)
GLUCOSE SERPL-MCNC: 114 MG/DL (ref 74–99)
HCT VFR BLD AUTO: 32.6 % (ref 41–52)
HGB BLD-MCNC: 10.4 G/DL (ref 13.5–17.5)
MAGNESIUM SERPL-MCNC: 1.82 MG/DL (ref 1.6–2.4)
MCH RBC QN AUTO: 26.5 PG (ref 26–34)
MCHC RBC AUTO-ENTMCNC: 31.9 G/DL (ref 32–36)
MCV RBC AUTO: 83 FL (ref 80–100)
NRBC BLD-RTO: 0 /100 WBCS (ref 0–0)
PHOSPHATE SERPL-MCNC: 2.9 MG/DL (ref 2.5–4.9)
PLATELET # BLD AUTO: 310 X10*3/UL (ref 150–450)
POTASSIUM SERPL-SCNC: 3.1 MMOL/L (ref 3.5–5.3)
PROT SERPL-MCNC: 6.1 G/DL (ref 6.4–8.2)
RBC # BLD AUTO: 3.93 X10*6/UL (ref 4.5–5.9)
SODIUM SERPL-SCNC: 143 MMOL/L (ref 136–145)
WBC # BLD AUTO: 11.1 X10*3/UL (ref 4.4–11.3)

## 2024-08-16 PROCEDURE — 82947 ASSAY GLUCOSE BLOOD QUANT: CPT

## 2024-08-16 PROCEDURE — 2500000002 HC RX 250 W HCPCS SELF ADMINISTERED DRUGS (ALT 637 FOR MEDICARE OP, ALT 636 FOR OP/ED): Performed by: NURSE PRACTITIONER

## 2024-08-16 PROCEDURE — 2500000001 HC RX 250 WO HCPCS SELF ADMINISTERED DRUGS (ALT 637 FOR MEDICARE OP): Performed by: NURSE PRACTITIONER

## 2024-08-16 PROCEDURE — 36415 COLL VENOUS BLD VENIPUNCTURE: CPT | Performed by: NURSE PRACTITIONER

## 2024-08-16 PROCEDURE — 2500000004 HC RX 250 GENERAL PHARMACY W/ HCPCS (ALT 636 FOR OP/ED): Performed by: NURSE PRACTITIONER

## 2024-08-16 PROCEDURE — 85027 COMPLETE CBC AUTOMATED: CPT | Performed by: NURSE PRACTITIONER

## 2024-08-16 PROCEDURE — 84100 ASSAY OF PHOSPHORUS: CPT

## 2024-08-16 PROCEDURE — 80053 COMPREHEN METABOLIC PANEL: CPT | Performed by: NURSE PRACTITIONER

## 2024-08-16 PROCEDURE — 83735 ASSAY OF MAGNESIUM: CPT | Performed by: NURSE PRACTITIONER

## 2024-08-16 PROCEDURE — 2500000005 HC RX 250 GENERAL PHARMACY W/O HCPCS: Performed by: NURSE PRACTITIONER

## 2024-08-16 RX ORDER — OMEPRAZOLE 20 MG/1
40 CAPSULE, DELAYED RELEASE ORAL DAILY
Qty: 60 CAPSULE | Refills: 0 | Status: SHIPPED | OUTPATIENT
Start: 2024-08-16 | End: 2024-09-15

## 2024-08-16 RX ORDER — AMOXICILLIN AND CLAVULANATE POTASSIUM 500; 125 MG/1; MG/1
1 TABLET, FILM COATED ORAL 3 TIMES DAILY
Qty: 12 TABLET | Refills: 0 | Status: SHIPPED | OUTPATIENT
Start: 2024-08-16 | End: 2024-08-20

## 2024-08-16 ASSESSMENT — COGNITIVE AND FUNCTIONAL STATUS - GENERAL
CLIMB 3 TO 5 STEPS WITH RAILING: TOTAL
DRESSING REGULAR UPPER BODY CLOTHING: TOTAL
DRESSING REGULAR LOWER BODY CLOTHING: TOTAL
TURNING FROM BACK TO SIDE WHILE IN FLAT BAD: TOTAL
MOVING TO AND FROM BED TO CHAIR: TOTAL
HELP NEEDED FOR BATHING: TOTAL
PERSONAL GROOMING: TOTAL
MOVING FROM LYING ON BACK TO SITTING ON SIDE OF FLAT BED WITH BEDRAILS: TOTAL
DAILY ACTIVITIY SCORE: 6
WALKING IN HOSPITAL ROOM: TOTAL
STANDING UP FROM CHAIR USING ARMS: TOTAL
TOILETING: TOTAL
MOBILITY SCORE: 6
EATING MEALS: TOTAL

## 2024-08-16 ASSESSMENT — PAIN SCALES - WONG BAKER: WONGBAKER_NUMERICALRESPONSE: NO HURT

## 2024-08-16 NOTE — NURSING NOTE
No issues during the night. Tolerating thickened puree  spoon feed diet. Accu check within normal range. Completed Na Phosphate for 2.0 level. No seizures on po Keppra.

## 2024-08-16 NOTE — TELEPHONE ENCOUNTER
Spoke with Nurse Burr. She said that patient has a GI physician already and she will call their group to get him a follow up scheduled.

## 2024-08-16 NOTE — CARE PLAN
The patient's goals for the shift include      The clinical goals for the shift include Patient will be able to rolerate puree diet spoon feeding this shift.

## 2024-08-16 NOTE — DISCHARGE SUMMARY
Discharge Diagnosis  Intestinal obstruction, unspecified cause, unspecified whether partial or complete (Multi)    Issues Requiring Follow-Up  Discharged to group home    Discharge Meds     Your medication list        START taking these medications        Instructions Last Dose Given Next Dose Due   amoxicillin-pot clavulanate 500-125 mg tablet  Commonly known as: Augmentin      Take 1 tablet by mouth 3 times a day for 4 days.       bisacodyl 10 mg suppository  Commonly known as: Dulcolax      Insert 1 suppository (10 mg) into the rectum once daily as needed for constipation.       omeprazole 20 mg DR capsule  Commonly known as: PriLOSEC      Take 2 capsules (40 mg) by mouth once daily. Do not crush or chew.       sodium phosphates 19-7 gram/118 mL enema enema  Commonly known as: Fleets      Insert 133 mL (1 enema) into the rectum once daily as needed for constipation.              CONTINUE taking these medications        Instructions Last Dose Given Next Dose Due   amLODIPine 10 mg tablet  Commonly known as: Norvasc           aspirin 81 mg chewable tablet           chlorhexidine 0.12 % solution  Commonly known as: Peridex           cholecalciferol 25 MCG (1000 UT) tablet  Commonly known as: Vitamin D-3           cloNIDine 0.2 mg tablet  Commonly known as: Catapres           Dupixent Pen 300 mg/2 mL pen injector  Generic drug: dupilumab      INJECT 300MG (1 PEN) UNDER THE SKIN EVERY OTHER WEEK.       hydroCHLOROthiazide 12.5 mg tablet  Commonly known as: Microzide           labetalol 100 mg tablet  Commonly known as: Normodyne           levETIRAcetam 500 mg tablet  Commonly known as: Keppra           lisinopril 20 mg tablet           multivitamin with minerals tablet           OXcarbazepine 150 mg tablet  Commonly known as: Trileptal           OXcarbazepine 300 mg tablet  Commonly known as: Trileptal           polyethylene glycol 17 gram/dose powder  Commonly known as: Glycolax, Miralax           potassium chloride  ER 10 mEq ER capsule  Commonly known as: Micro-K           potassium chloride ER 10 mEq ER capsule  Commonly known as: Micro-K           sennosides-docusate sodium 8.6-50 mg tablet  Commonly known as: Katty-Colace                     Where to Get Your Medications        These medications were sent to [facility] [address]  [address] [address]      Phone: [phone]   amoxicillin-pot clavulanate 500-125 mg tablet  omeprazole 20 mg DR capsule  sodium phosphates 19-7 gram/118 mL enema enema       Information about where to get these medications is not yet available    Ask your nurse or doctor about these medications  bisacodyl 10 mg suppository         Test Results Pending At Discharge  Pending Labs       No current pending labs.            Hospital Course   Patient was admitted to the hospital related to possible small bowel obstruction patient was diagnosed with ileus treated with bowel regimen by GI and surgery patient also had aspiration pneumonia  Patient had a history of MRDD  Patient did okay start eating with bowel movement we will discharge him on Augmentin for aspiration pneumonia    Pertinent Physical Exam At Time of Discharge  Physical Exam  Patient is contracted nonverbal  Outpatient Follow-Up  Future Appointments   Date Time Provider Department Center   8/23/2024 11:10 AM Keturah Nathan DO [id number] Logan Husain MD

## 2024-08-16 NOTE — NURSING NOTE
Patient discharged, instructions and report called to Aminah at the group home, patient placed in wheelchair and transported by

## 2024-08-16 NOTE — NURSING NOTE
ADOD: 8/16/24  Destination: [facility]  Transportation Provided by: van provided by group home  Patient feels updated by provider(s) and involved in POC. Aminah nurse @ group [place] given dc instruction by floor nurse.  Pt is on room air pulse ox 92-93%.  Patient has been advised to defer to AVS for new medications and follow-up visits.   Patient is inactive with MyChart.  Patient's Pharmacy [facility].  Appointments will be made by caregivers  Patient has been notified about a survey and call back is to be expected 1-2 weeks post discharge.   Notified patient that DC Navigator is available everyday throughout their stay if any assistance is needed.

## 2024-08-16 NOTE — PROGRESS NOTES
08/16/24 1101   Discharge Planning   Care Facility Name Called Aminah the nurse fron the group home.  She will contact his cousin about discharge . currently onO2 he isnot able to return with O2 . ambulatory POX needed to determine need for O2     Will need stretcher transport.   1154 Pox without O2 92-93%. Transport scheduled   ambulatory

## 2024-08-21 ENCOUNTER — SPECIALTY PHARMACY (OUTPATIENT)
Dept: PHARMACY | Facility: CLINIC | Age: 59
End: 2024-08-21

## 2024-08-21 DIAGNOSIS — L20.89 OTHER ATOPIC DERMATITIS: ICD-10-CM

## 2024-08-21 RX ORDER — DUPILUMAB 300 MG/2ML
INJECTION, SOLUTION SUBCUTANEOUS
Qty: 4 ML | Refills: 5 | Status: SHIPPED | OUTPATIENT
Start: 2024-08-21 | End: 2025-02-17

## 2024-08-23 ENCOUNTER — APPOINTMENT (OUTPATIENT)
Dept: DERMATOLOGY | Facility: CLINIC | Age: 59
End: 2024-08-23
Payer: MEDICAID

## 2024-08-23 DIAGNOSIS — L20.89 OTHER ATOPIC DERMATITIS: Primary | ICD-10-CM

## 2024-08-23 DIAGNOSIS — Z79.899 ENCOUNTER FOR LONG-TERM (CURRENT) USE OF HIGH-RISK MEDICATION: ICD-10-CM

## 2024-08-23 PROCEDURE — 99213 OFFICE O/P EST LOW 20 MIN: CPT | Performed by: DERMATOLOGY

## 2024-08-23 PROCEDURE — 1036F TOBACCO NON-USER: CPT | Performed by: DERMATOLOGY

## 2024-08-23 ASSESSMENT — PHYSICIAN GLOBAL ASSESSMENT (PGA): WHAT IS THE PGA: PHYSICIAN GLOBAL ASSESSMENT:  0 - CLEAR

## 2024-08-23 ASSESSMENT — DERMATOLOGY PATIENT ASSESSMENT
DO YOU HAVE ANY NEW OR CHANGING LESIONS: NO
FOR PATIENTS COMING IN FOR A FOLLOW-UP VISIT - HAVE THERE BEEN ANY CHANGES IN YOUR HEALTH SINCE YOUR LAST VISIT: ALL IN MYCHART
DO YOU USE SUNSCREEN: OCCASIONALLY
ARE YOU AN ORGAN TRANSPLANT RECIPIENT: NO
DO YOU USE A TANNING BED: NO

## 2024-08-23 ASSESSMENT — BODY SURFACE AREA (BSA): WHAT IS THE BSA PERCENTAGE: < 3% BODY SURFACE AREA INVOLVED

## 2024-08-23 ASSESSMENT — PATIENT GLOBAL ASSESSMENT (PGA): PATIENT GLOBAL ASSESSMENT: PATIENT GLOBAL ASSESSMENT:  2 - MILD

## 2024-08-23 ASSESSMENT — DERMATOLOGY QUALITY OF LIFE (QOL) ASSESSMENT
RATE HOW EMOTIONALLY BOTHERED YOU ARE BY YOUR SKIN PROBLEM (FOR EXAMPLE, WORRY, EMBARRASSMENT, FRUSTRATION): 0 - NEVER BOTHERED
ARE THERE EXCLUSIONS OR EXCEPTIONS FOR THE QUALITY OF LIFE ASSESSMENT: NO
RATE HOW BOTHERED YOU ARE BY EFFECTS OF YOUR SKIN PROBLEMS ON YOUR ACTIVITIES (EG, GOING OUT, ACCOMPLISHING WHAT YOU WANT, WORK ACTIVITIES OR YOUR RELATIONSHIPS WITH OTHERS): 0 - NEVER BOTHERED
WHAT SINGLE SKIN CONDITION LISTED BELOW IS THE PATIENT ANSWERING THE QUALITY-OF-LIFE ASSESSMENT QUESTIONS ABOUT: DERMATITIS
RATE HOW BOTHERED YOU ARE BY SYMPTOMS OF YOUR SKIN PROBLEM (EG, ITCHING, STINGING BURNING, HURTING OR SKIN IRRITATION): 1

## 2024-08-23 ASSESSMENT — ITCH NUMERIC RATING SCALE: HOW SEVERE IS YOUR ITCHING?: 0

## 2024-08-23 NOTE — PROGRESS NOTES
Subjective     aMximo Pablo is a 59 y.o. male who presents for the following: Dermatitis (Pt here today with caregiver from group home. Here for 6 month follow up on Atopic Dermatitis, widespread over body. Current tx-Dupixent 300mg every other week, Triamcinolone 0.1% topical ointment BID x2 weeks PRN. Labs and Tspot- 8/2023(does have CMP and CBC from different provider this month). ).   Recently admitted for bowel issue     Review of Systems:  No other skin or systemic complaints other than what is documented elsewhere in the note.    The following portions of the chart were reviewed this encounter and updated as appropriate:          Skin Cancer History  No skin cancer on file.      Specialty Problems          Dermatology Problems    Neoplasm of uncertain behavior of skin    Rash and other nonspecific skin eruption    Atopic dermatitis, unspecified        Objective   Well appearing patient in no apparent distress; mood and affect are within normal limits.    A focused skin examination was performed face, neck, chest, upper extremities, lower extremities. Clothing left on, limited due to wheelchair. All findings within normal limits unless otherwise noted below.    Assessment/Plan   1. Other atopic dermatitis  Hyperpigmented linear macules and patches at sites or previous scratching  BSA < 3%  SCORAD approximately 10    The chronic and intermittently flaring nature of this skin condition was discussed with the patient/cargiver today.     Patient previously on topical triamcinolone ointment with improvement, however continued to flare a few months later. He has been on Dupixent for over 1 year and is doing extremely well on treatment.    Caregiver does not report side effects on treatment. Feels as though this is the best he has been.    Last labs:     Continue Dupixent 300mg every other week subcutaneously  Continue triamcinolone 0.1% topical ointment as needed for flaring. apply 2x daily x 2 wks then decrease  to 2x/day 2 days per week. Can repeat full 2 week course as often as every 6-8 weeks as needed for flaring.     Dupixent is a medication indicated for moderate to severe atopic dermatitis that does not respond to routine topical therapy. Side effects of medication including allergic reactions (hives, swelling, itching, rash), injection site reaction (swelling at site where medication injected with pen or syringe), increased risk of infection (specifically parasitic), cold sores, joint pain, sore throat, dry, red eye (pink eye like symptoms).      Side effects of topical steroids includes, but is not limited to skin atrophy and dyspigmentation.    Due for Tspot  CBC and Diff reviewed from earlier this month due to hospitalization.      T-SPOT (Tb)    Related Medications  dupilumab (Dupixent Pen) 300 mg/2 mL pen injector  INJECT 300MG (1 PEN) UNDER THE SKIN EVERY OTHER WEEK.    2. Encounter for long-term (current) use of high-risk medication    Related Procedures  T-SPOT (Tb)

## 2024-08-29 PROCEDURE — RXMED WILLOW AMBULATORY MEDICATION CHARGE

## 2024-08-30 ENCOUNTER — PHARMACY VISIT (OUTPATIENT)
Dept: PHARMACY | Facility: CLINIC | Age: 59
End: 2024-08-30
Payer: MEDICAID

## 2024-09-04 ENCOUNTER — SPECIALTY PHARMACY (OUTPATIENT)
Dept: PHARMACY | Facility: CLINIC | Age: 59
End: 2024-09-04

## 2024-09-04 NOTE — PROGRESS NOTES
[facility] Clinical Note    Maximo Pablo is a 59 y.o. male, who is on the specialty pharmacy service for management of:  Dermatology Core.    Maximo Pablo is taking: Dupixent.    Medication Receipt Date: scheduled for 9/4/24  Medication Start Date (planned or actual): 6/2022    Maximo was contacted on 9/4/2024 at 11:36 AM for a virtual pharmacy visit with encounter number [id number] from:   [facility]  [address] [place] [id number]  Dept: [phone]  Dept Fax: [phone]    Maximo was offered a Telemedicine Video visit or Telephone visit.  Maximo consented to a telephone visit, which was performed.    The most recent encounter visit with the referring prescriber   Keturah Nathan DO  on 8/23/24 was reviewed.  Pharmacy will continue to collaborate in the care of this patient with the referring prescriber   Keturah Nathan DO .    General Assessment      Impression/Plan  IMPRESSION/PLAN:  Is patient high risk (potential patients:  pregnancy, geriatric, pediatric)?  no  Is laboratory follow-up needed? no  Is a clinical intervention needed? no  Next reassessment date? ~6 months  Additional comments:     Refer to the encounter summary report for documentation details about patient counseling and education.      Medication Adherence    The importance of adherence was discussed with the patient and they were advised to take the medication as prescribed by their provider. Patient was encouraged to call their physician's office if they have a question regarding a missed dose.        Patient was advised to contact the pharmacy if there are any changes to their medication list, including prescriptions, OTC medications, herbal products, or supplements. Patient was advised of [facility]'s dispensing process, refill timeline, contact information ([phone]), and patient management follow up. Patient confirmed  understanding of education conducted during assessment. All patient questions and concerns were addressed to the best of my ability. Patient was encouraged to contact the specialty pharmacy with any questions or concerns.    Confirmed follow-up outreaches are properly scheduled and reviewed goals of therapy with the patient.        STEFFI HOUSTON, PharmD

## 2024-09-17 LAB
ATRIAL RATE: 130 BPM
P AXIS: 48 DEGREES
P OFFSET: 198 MS
P ONSET: 151 MS
PR INTERVAL: 142 MS
Q ONSET: 222 MS
QRS COUNT: 21 BEATS
QRS DURATION: 72 MS
QT INTERVAL: 300 MS
QTC CALCULATION(BAZETT): 441 MS
QTC FREDERICIA: 388 MS
R AXIS: 36 DEGREES
T AXIS: 61 DEGREES
T OFFSET: 372 MS
VENTRICULAR RATE: 130 BPM

## 2024-09-19 ENCOUNTER — LAB REQUISITION (OUTPATIENT)
Dept: LAB | Facility: HOSPITAL | Age: 59
End: 2024-09-19
Payer: MEDICAID

## 2024-09-19 DIAGNOSIS — N18.2 CHRONIC KIDNEY DISEASE, STAGE 2 (MILD): ICD-10-CM

## 2024-09-19 DIAGNOSIS — E87.0 HYPEROSMOLALITY AND HYPERNATREMIA: ICD-10-CM

## 2024-09-19 DIAGNOSIS — Z13.1 ENCOUNTER FOR SCREENING FOR DIABETES MELLITUS: ICD-10-CM

## 2024-09-19 DIAGNOSIS — E78.5 HYPERLIPIDEMIA, UNSPECIFIED: ICD-10-CM

## 2024-09-19 DIAGNOSIS — F73 PROFOUND INTELLECTUAL DISABILITIES: ICD-10-CM

## 2024-09-19 DIAGNOSIS — I10 ESSENTIAL (PRIMARY) HYPERTENSION: ICD-10-CM

## 2024-09-19 LAB
25(OH)D3 SERPL-MCNC: 63 NG/ML (ref 30–100)
ALBUMIN SERPL BCP-MCNC: 4.4 G/DL (ref 3.4–5)
ALP SERPL-CCNC: 243 U/L (ref 33–120)
ALT SERPL W P-5'-P-CCNC: 38 U/L (ref 10–52)
ANION GAP SERPL CALC-SCNC: 12 MMOL/L (ref 10–20)
AST SERPL W P-5'-P-CCNC: 34 U/L (ref 9–39)
BASOPHILS # BLD AUTO: 0.04 X10*3/UL (ref 0–0.1)
BASOPHILS NFR BLD AUTO: 0.9 %
BILIRUB DIRECT SERPL-MCNC: 0 MG/DL (ref 0–0.3)
BILIRUB SERPL-MCNC: 0.3 MG/DL (ref 0–1.2)
BUN SERPL-MCNC: 6 MG/DL (ref 6–23)
CALCIUM SERPL-MCNC: 10.5 MG/DL (ref 8.6–10.3)
CHLORIDE SERPL-SCNC: 98 MMOL/L (ref 98–107)
CHOLEST SERPL-MCNC: 210 MG/DL (ref 0–199)
CHOLESTEROL/HDL RATIO: 4.4
CO2 SERPL-SCNC: 32 MMOL/L (ref 21–32)
CREAT SERPL-MCNC: 0.74 MG/DL (ref 0.5–1.3)
EGFRCR SERPLBLD CKD-EPI 2021: >90 ML/MIN/1.73M*2
EOSINOPHIL # BLD AUTO: 0.7 X10*3/UL (ref 0–0.7)
EOSINOPHIL NFR BLD AUTO: 14.9 %
ERYTHROCYTE [DISTWIDTH] IN BLOOD BY AUTOMATED COUNT: 14.9 % (ref 11.5–14.5)
EST. AVERAGE GLUCOSE BLD GHB EST-MCNC: 114 MG/DL
GLUCOSE SERPL-MCNC: 92 MG/DL (ref 74–99)
HBA1C MFR BLD: 5.6 %
HCT VFR BLD AUTO: 44.6 % (ref 41–52)
HDLC SERPL-MCNC: 47.2 MG/DL
HGB BLD-MCNC: 14.5 G/DL (ref 13.5–17.5)
HOLD SPECIMEN: NORMAL
HOLD SPECIMEN: NORMAL
IMM GRANULOCYTES # BLD AUTO: 0.01 X10*3/UL (ref 0–0.7)
IMM GRANULOCYTES NFR BLD AUTO: 0.2 % (ref 0–0.9)
LDLC SERPL CALC-MCNC: 101 MG/DL
LEVETIRACETAM SERPL-MCNC: 16 UG/ML (ref 10–40)
LYMPHOCYTES # BLD AUTO: 2.33 X10*3/UL (ref 1.2–4.8)
LYMPHOCYTES NFR BLD AUTO: 49.6 %
MCH RBC QN AUTO: 27.2 PG (ref 26–34)
MCHC RBC AUTO-ENTMCNC: 32.5 G/DL (ref 32–36)
MCV RBC AUTO: 84 FL (ref 80–100)
MONOCYTES # BLD AUTO: 0.32 X10*3/UL (ref 0.1–1)
MONOCYTES NFR BLD AUTO: 6.8 %
NEUTROPHILS # BLD AUTO: 1.3 X10*3/UL (ref 1.2–7.7)
NEUTROPHILS NFR BLD AUTO: 27.6 %
NON HDL CHOLESTEROL: 163 MG/DL (ref 0–149)
NRBC BLD-RTO: 0 /100 WBCS (ref 0–0)
PLATELET # BLD AUTO: 276 X10*3/UL (ref 150–450)
POTASSIUM SERPL-SCNC: 4.2 MMOL/L (ref 3.5–5.3)
PROT SERPL-MCNC: 8.2 G/DL (ref 6.4–8.2)
RBC # BLD AUTO: 5.34 X10*6/UL (ref 4.5–5.9)
SODIUM SERPL-SCNC: 138 MMOL/L (ref 136–145)
T4 FREE SERPL-MCNC: 0.57 NG/DL (ref 0.61–1.12)
TRIGL SERPL-MCNC: 310 MG/DL (ref 0–149)
TSH SERPL-ACNC: 1.49 MIU/L (ref 0.44–3.98)
URATE SERPL-MCNC: 6 MG/DL (ref 4–7.5)
VLDL: 62 MG/DL (ref 0–40)
WBC # BLD AUTO: 4.7 X10*3/UL (ref 4.4–11.3)

## 2024-09-19 PROCEDURE — 82306 VITAMIN D 25 HYDROXY: CPT | Mod: OUT,PARLAB | Performed by: INTERNAL MEDICINE

## 2024-09-19 PROCEDURE — 80053 COMPREHEN METABOLIC PANEL: CPT | Mod: OUT | Performed by: INTERNAL MEDICINE

## 2024-09-19 PROCEDURE — 80061 LIPID PANEL: CPT | Mod: OUT | Performed by: INTERNAL MEDICINE

## 2024-09-19 PROCEDURE — 83036 HEMOGLOBIN GLYCOSYLATED A1C: CPT | Mod: OUT | Performed by: INTERNAL MEDICINE

## 2024-09-19 PROCEDURE — 80177 DRUG SCRN QUAN LEVETIRACETAM: CPT | Mod: OUT,PARLAB | Performed by: INTERNAL MEDICINE

## 2024-09-19 PROCEDURE — 80183 DRUG SCRN QUANT OXCARBAZEPIN: CPT | Mod: OUT | Performed by: INTERNAL MEDICINE

## 2024-09-19 PROCEDURE — 82248 BILIRUBIN DIRECT: CPT | Mod: OUT | Performed by: INTERNAL MEDICINE

## 2024-09-19 PROCEDURE — 84439 ASSAY OF FREE THYROXINE: CPT | Mod: OUT | Performed by: INTERNAL MEDICINE

## 2024-09-19 PROCEDURE — 84550 ASSAY OF BLOOD/URIC ACID: CPT | Mod: OUT | Performed by: INTERNAL MEDICINE

## 2024-09-19 PROCEDURE — 85025 COMPLETE CBC W/AUTO DIFF WBC: CPT | Mod: OUT | Performed by: INTERNAL MEDICINE

## 2024-09-19 PROCEDURE — 84443 ASSAY THYROID STIM HORMONE: CPT | Mod: OUT | Performed by: INTERNAL MEDICINE

## 2024-09-21 LAB — 10OH-CARBAZEPINE SERPL-MCNC: 25 UG/ML (ref 3–35)

## 2024-09-22 ENCOUNTER — HOSPITAL ENCOUNTER (EMERGENCY)
Facility: HOSPITAL | Age: 59
Discharge: HOME | End: 2024-09-22
Attending: STUDENT IN AN ORGANIZED HEALTH CARE EDUCATION/TRAINING PROGRAM
Payer: MEDICAID

## 2024-09-22 ENCOUNTER — APPOINTMENT (OUTPATIENT)
Dept: RADIOLOGY | Facility: HOSPITAL | Age: 59
End: 2024-09-22
Payer: MEDICAID

## 2024-09-22 VITALS
TEMPERATURE: 97 F | SYSTOLIC BLOOD PRESSURE: 151 MMHG | BODY MASS INDEX: 26.05 KG/M2 | DIASTOLIC BLOOD PRESSURE: 87 MMHG | HEART RATE: 54 BPM | OXYGEN SATURATION: 95 % | WEIGHT: 176.37 LBS | RESPIRATION RATE: 16 BRPM

## 2024-09-22 DIAGNOSIS — K59.00 CONSTIPATION, UNSPECIFIED CONSTIPATION TYPE: Primary | ICD-10-CM

## 2024-09-22 DIAGNOSIS — K52.89 STERCORAL COLITIS: ICD-10-CM

## 2024-09-22 DIAGNOSIS — K56.41 FECAL IMPACTION (MULTI): ICD-10-CM

## 2024-09-22 LAB
ALBUMIN SERPL BCP-MCNC: 3.7 G/DL (ref 3.4–5)
ALP SERPL-CCNC: 194 U/L (ref 33–120)
ALT SERPL W P-5'-P-CCNC: 35 U/L (ref 10–52)
ANION GAP SERPL CALC-SCNC: 11 MMOL/L (ref 10–20)
APPEARANCE UR: CLEAR
AST SERPL W P-5'-P-CCNC: 36 U/L (ref 9–39)
BASOPHILS # BLD AUTO: 0.03 X10*3/UL (ref 0–0.1)
BASOPHILS NFR BLD AUTO: 0.6 %
BILIRUB SERPL-MCNC: 0.2 MG/DL (ref 0–1.2)
BILIRUB UR STRIP.AUTO-MCNC: NEGATIVE MG/DL
BUN SERPL-MCNC: 5 MG/DL (ref 6–23)
CALCIUM SERPL-MCNC: 9.7 MG/DL (ref 8.6–10.3)
CHLORIDE SERPL-SCNC: 101 MMOL/L (ref 98–107)
CO2 SERPL-SCNC: 28 MMOL/L (ref 21–32)
COLOR UR: YELLOW
CREAT SERPL-MCNC: 0.67 MG/DL (ref 0.5–1.3)
EGFRCR SERPLBLD CKD-EPI 2021: >90 ML/MIN/1.73M*2
EOSINOPHIL # BLD AUTO: 0.67 X10*3/UL (ref 0–0.7)
EOSINOPHIL NFR BLD AUTO: 14.3 %
ERYTHROCYTE [DISTWIDTH] IN BLOOD BY AUTOMATED COUNT: 15.4 % (ref 11.5–14.5)
GLUCOSE SERPL-MCNC: 114 MG/DL (ref 74–99)
GLUCOSE UR STRIP.AUTO-MCNC: NORMAL MG/DL
HCT VFR BLD AUTO: 40.4 % (ref 41–52)
HGB BLD-MCNC: 12.7 G/DL (ref 13.5–17.5)
IMM GRANULOCYTES # BLD AUTO: 0.01 X10*3/UL (ref 0–0.7)
IMM GRANULOCYTES NFR BLD AUTO: 0.2 % (ref 0–0.9)
KETONES UR STRIP.AUTO-MCNC: NEGATIVE MG/DL
LEUKOCYTE ESTERASE UR QL STRIP.AUTO: NEGATIVE
LIPASE SERPL-CCNC: 21 U/L (ref 9–82)
LYMPHOCYTES # BLD AUTO: 1.77 X10*3/UL (ref 1.2–4.8)
LYMPHOCYTES NFR BLD AUTO: 37.7 %
MCH RBC QN AUTO: 26.5 PG (ref 26–34)
MCHC RBC AUTO-ENTMCNC: 31.4 G/DL (ref 32–36)
MCV RBC AUTO: 84 FL (ref 80–100)
MONOCYTES # BLD AUTO: 0.53 X10*3/UL (ref 0.1–1)
MONOCYTES NFR BLD AUTO: 11.3 %
NEUTROPHILS # BLD AUTO: 1.68 X10*3/UL (ref 1.2–7.7)
NEUTROPHILS NFR BLD AUTO: 35.9 %
NITRITE UR QL STRIP.AUTO: NEGATIVE
NRBC BLD-RTO: 0 /100 WBCS (ref 0–0)
PH UR STRIP.AUTO: 6 [PH]
PLATELET # BLD AUTO: 227 X10*3/UL (ref 150–450)
POTASSIUM SERPL-SCNC: 4.1 MMOL/L (ref 3.5–5.3)
PROT SERPL-MCNC: 7.4 G/DL (ref 6.4–8.2)
PROT UR STRIP.AUTO-MCNC: NEGATIVE MG/DL
RBC # BLD AUTO: 4.8 X10*6/UL (ref 4.5–5.9)
RBC # UR STRIP.AUTO: NEGATIVE /UL
SODIUM SERPL-SCNC: 136 MMOL/L (ref 136–145)
SP GR UR STRIP.AUTO: 1.03
UROBILINOGEN UR STRIP.AUTO-MCNC: NORMAL MG/DL
WBC # BLD AUTO: 4.7 X10*3/UL (ref 4.4–11.3)

## 2024-09-22 PROCEDURE — 96360 HYDRATION IV INFUSION INIT: CPT | Mod: 59

## 2024-09-22 PROCEDURE — 74177 CT ABD & PELVIS W/CONTRAST: CPT | Performed by: STUDENT IN AN ORGANIZED HEALTH CARE EDUCATION/TRAINING PROGRAM

## 2024-09-22 PROCEDURE — 83690 ASSAY OF LIPASE: CPT

## 2024-09-22 PROCEDURE — 74177 CT ABD & PELVIS W/CONTRAST: CPT

## 2024-09-22 PROCEDURE — 81003 URINALYSIS AUTO W/O SCOPE: CPT

## 2024-09-22 PROCEDURE — 85025 COMPLETE CBC W/AUTO DIFF WBC: CPT

## 2024-09-22 PROCEDURE — 80053 COMPREHEN METABOLIC PANEL: CPT

## 2024-09-22 PROCEDURE — 2550000001 HC RX 255 CONTRASTS: Performed by: STUDENT IN AN ORGANIZED HEALTH CARE EDUCATION/TRAINING PROGRAM

## 2024-09-22 PROCEDURE — 36415 COLL VENOUS BLD VENIPUNCTURE: CPT

## 2024-09-22 PROCEDURE — 2500000004 HC RX 250 GENERAL PHARMACY W/ HCPCS (ALT 636 FOR OP/ED)

## 2024-09-22 PROCEDURE — 99284 EMERGENCY DEPT VISIT MOD MDM: CPT | Mod: 25

## 2024-09-22 ASSESSMENT — PAIN - FUNCTIONAL ASSESSMENT: PAIN_FUNCTIONAL_ASSESSMENT: 0-10

## 2024-09-22 ASSESSMENT — LIFESTYLE VARIABLES
EVER FELT BAD OR GUILTY ABOUT YOUR DRINKING: NO
TOTAL SCORE: 0
EVER HAD A DRINK FIRST THING IN THE MORNING TO STEADY YOUR NERVES TO GET RID OF A HANGOVER: NO
HAVE YOU EVER FELT YOU SHOULD CUT DOWN ON YOUR DRINKING: NO
HAVE PEOPLE ANNOYED YOU BY CRITICIZING YOUR DRINKING: NO

## 2024-09-22 ASSESSMENT — PAIN SCALES - GENERAL: PAINLEVEL_OUTOF10: 0 - NO PAIN

## 2024-09-22 NOTE — ED PROVIDER NOTES
EMERGENCY DEPARTMENT ENCOUNTER      Pt Name: Maximo Pablo  MRN: [id number]  Birthdate 1965  Date of evaluation: 9/22/2024  Provider: Jose Billingsley DO    CHIEF COMPLAINT       Chief Complaint   Patient presents with    Constipation         HISTORY OF PRESENT ILLNESS    59-year-old male, MRDD, nonverbal, from group home, comes emergency room for constipation, he received MiraLAX, enema at this facility, has not had an enema since the 16th, is having worsening abdominal distention, did not want to eat today.  He not has not had a bowel movement since Friday according to her with his group home.  Unable to obtain any further history as patient is nonverbal.      History provided by:  Patient      Nursing Notes were reviewed.    PAST MEDICAL HISTORY     Past Medical History:   Diagnosis Date    Epilepsy, unspecified, not intractable, without status epilepticus (Multi)     Epilepsy    Hyperlipidemia, unspecified     Dyslipidemia    Personal history of other diseases of the circulatory system     History of hypertension    Personal history of other diseases of the nervous system and sense organs     History of cerebral palsy    Personal history of other endocrine, nutritional and metabolic disease     History of vitamin D deficiency    Personal history of other specified conditions     History of dysphagia         SURGICAL HISTORY       Past Surgical History:   Procedure Laterality Date    OTHER SURGICAL HISTORY  11/26/2019    No history of surgery         CURRENT MEDICATIONS       Previous Medications    AMLODIPINE (NORVASC) 10 MG TABLET    Take 1 tablet (10 mg) by mouth once daily.    ASPIRIN 81 MG CHEWABLE TABLET    Chew 1 tablet (81 mg) once daily.    CHLORHEXIDINE (PERIDEX) 0.12 % SOLUTION    Take 15 mL by mouth twice a day.    CHOLECALCIFEROL (VITAMIN D-3) 25 MCG (1000 UT) TABLET    Take 1 tablet (25 mcg) by mouth once daily.    CLONIDINE (CATAPRES) 0.2 MG TABLET    Take 1 tablet (0.2 mg) by mouth 2 times a day.     DUPILUMAB (DUPIXENT PEN) 300 MG/2 ML PEN INJECTOR    INJECT 300MG (1 PEN) UNDER THE SKIN EVERY OTHER WEEK.    HYDROCHLOROTHIAZIDE (HYDRODIURIL) 12.5 MG TABLET    Take 1 tablet (12.5 mg) by mouth once daily.    LABETALOL (NORMODYNE) 100 MG TABLET    Take 1 tablet (100 mg) by mouth 2 times a day.    LEVETIRACETAM (KEPPRA) 500 MG TABLET    Take 1 tablet (500 mg) by mouth twice a day.    LISINOPRIL 20 MG TABLET    Take 1 tablet (20 mg) by mouth 2 times a day.    MULTIVITAMIN WITH MINERALS TABLET    Take 1 tablet by mouth once daily.    OMEPRAZOLE (PRILOSEC) 20 MG DR CAPSULE    Take 2 capsules (40 mg) by mouth once daily. Do not crush or chew.    OXCARBAZEPINE (TRILEPTAL) 150 MG TABLET    Take 1 tablet (150 mg) by mouth 2 times a day. Total of 450mg    OXCARBAZEPINE (TRILEPTAL) 300 MG TABLET    Take 1 tablet (300 mg) by mouth 2 times a day. Total of 450mg    POLYETHYLENE GLYCOL (GLYCOLAX, MIRALAX) 17 GRAM/DOSE POWDER    Take 17 g by mouth once daily.    POTASSIUM CHLORIDE ER (MICRO-K) 10 MEQ ER CAPSULE    Take 1 capsule (10 mEq) by mouth once daily at bedtime.    POTASSIUM CHLORIDE ER (MICRO-K) 10 MEQ ER CAPSULE    Take 2 capsules (20 mEq) by mouth once daily in the morning.    SENNOSIDES-DOCUSATE SODIUM (EDY-COLACE) 8.6-50 MG TABLET    Take 1 tablet by mouth 2 times a day.       ALLERGIES     Patient has no known allergies.    FAMILY HISTORY     No family history on file.       SOCIAL HISTORY       Social History     Socioeconomic History    Marital status: Single   Tobacco Use    Smoking status: Never    Smokeless tobacco: Never   Substance and Sexual Activity    Alcohol use: Defer    Drug use: Defer     Social Determinants of Health     Financial Resource Strain: Patient Unable To Answer (8/12/2024)    Overall Financial Resource Strain (CARDIA)     Difficulty of Paying Living Expenses: Patient unable to answer   Transportation Needs: Patient Unable To Answer (8/12/2024)    PRAPARE - Transportation     Lack of  Transportation (Medical): Patient unable to answer     Lack of Transportation (Non-Medical): Patient unable to answer   Housing Stability: Patient Unable To Answer (8/12/2024)    Housing Stability Vital Sign     Unable to Pay for Housing in the Last Year: Patient unable to answer     Number of Times Moved in the Last Year: 1     Homeless in the Last Year: Patient unable to answer       SCREENINGS                        PHYSICAL EXAM    (up to 7 for level 4, 8 or more for level 5)     ED Triage Vitals [09/22/24 0925]   Temperature Heart Rate Respirations BP   36.1 °C (97 °F) 65 16 129/80      Pulse Ox Temp Source Heart Rate Source Patient Position   96 % Tympanic Monitor Sitting      BP Location FiO2 (%)     Right arm --       Physical Exam  Vitals and nursing note reviewed.   Constitutional:       Appearance: Normal appearance.   HENT:      Head: Normocephalic and atraumatic.   Eyes:      General: No scleral icterus.        Right eye: No discharge.         Left eye: No discharge.      Conjunctiva/sclera: Conjunctivae normal.   Cardiovascular:      Rate and Rhythm: Normal rate and regular rhythm.   Pulmonary:      Effort: Pulmonary effort is normal. No respiratory distress.   Abdominal:      General: There is distension.   Musculoskeletal:      Cervical back: Normal range of motion.   Skin:     General: Skin is warm and dry.   Neurological:      Mental Status: He is alert. Mental status is at baseline.   Psychiatric:         Mood and Affect: Mood normal.         Behavior: Behavior normal.          DIAGNOSTIC RESULTS     LABS:  Labs Reviewed   CBC WITH AUTO DIFFERENTIAL - Abnormal       Result Value    WBC 4.7      nRBC 0.0      RBC 4.80      Hemoglobin 12.7 (*)     Hematocrit 40.4 (*)     MCV 84      MCH 26.5      MCHC 31.4 (*)     RDW 15.4 (*)     Platelets 227      Neutrophils % 35.9      Immature Granulocytes %, Automated 0.2      Lymphocytes % 37.7      Monocytes % 11.3      Eosinophils % 14.3      Basophils %  0.6      Neutrophils Absolute 1.68      Immature Granulocytes Absolute, Automated 0.01      Lymphocytes Absolute 1.77      Monocytes Absolute 0.53      Eosinophils Absolute 0.67      Basophils Absolute 0.03     COMPREHENSIVE METABOLIC PANEL - Abnormal    Glucose 114 (*)     Sodium 136      Potassium 4.1      Chloride 101      Bicarbonate 28      Anion Gap 11      Urea Nitrogen 5 (*)     Creatinine 0.67      eGFR >90      Calcium 9.7      Albumin 3.7      Alkaline Phosphatase 194 (*)     Total Protein 7.4      AST 36      Bilirubin, Total 0.2      ALT 35     LIPASE - Normal    Lipase 21      Narrative:     Venipuncture immediately after or during the administration of Metamizole may lead to falsely low results. Testing should be performed immediately prior to Metamizole dosing.   URINALYSIS WITH REFLEX CULTURE AND MICROSCOPIC - Normal    Color, Urine Yellow      Appearance, Urine Clear      Specific Gravity, Urine 1.031      pH, Urine 6.0      Protein, Urine NEGATIVE      Glucose, Urine Normal      Blood, Urine NEGATIVE      Ketones, Urine NEGATIVE      Bilirubin, Urine NEGATIVE      Urobilinogen, Urine Normal      Nitrite, Urine NEGATIVE      Leukocyte Esterase, Urine NEGATIVE     URINALYSIS WITH REFLEX CULTURE AND MICROSCOPIC    Narrative:     The following orders were created for panel order Urinalysis with Reflex Culture and Microscopic.  Procedure                               Abnormality         Status                     ---------                               -----------         ------                     Urinalysis with Reflex C...[[id number]]  Normal              Final result               Extra Urine Gray Tube[[id number]]                            In process                   Please view results for these tests on the individual orders.   EXTRA URINE GRAY TUBE       All other labs were within normal range or not returned as of this dictation.    Imaging  CT abdomen pelvis w IV contrast   Final Result   1.   Moderate volume stool throughout the colon and rectum with   persistent evidence of diffuse circumferential wall thickening with   hyperenhancement in the rectum extending proximally to the level of   sigmoid colon with mild perirectal fat stranding. These findings are   more pronounced on today's examination compared to prior CT dated   08/12/2024 (within limitation of differences in technique). There is   moderate volume stool throughout the colon and rectum. These findings   could be related to stercoral proctitis/colitis. However an   underlying neoplastic process can not be completely excluded.   Recommend gastroenterology consultation and further evaluation with   sigmoidoscopy/colonoscopy as clinically warranted.   2. Reticular airspace opacities in bilateral lower lobes, likely   favored to represent atelectasis. However developing pneumonia can   also be considered in the differential in appropriate clinical   setting.        MACRO:   Critical Finding:  See findings. Notification was initiated on   9/22/2024 at 12:41 pm by Dr. Beth Calderon.  (**-YCF-**)   Instructions:        Signed by: Beth Calderon 9/22/2024 12:50 PM   Dictation workstation:   [id number]           Procedures  Procedures     EMERGENCY DEPARTMENT COURSE/MDM:     ED Course as of 09/22/24 1427   Sun Sep 22, 2024   1256 IMPRESSION:  1.  Moderate volume stool throughout the colon and rectum with  persistent evidence of diffuse circumferential wall thickening with  hyperenhancement in the rectum extending proximally to the level of  sigmoid colon with mild perirectal fat stranding. These findings are  more pronounced on today's examination compared to prior CT dated  08/12/2024 (within limitation of differences in technique). There is  moderate volume stool throughout the colon and rectum. These findings  could be related to stercoral proctitis/colitis. However an  underlying neoplastic process can not be completely excluded.  Recommend  gastroenterology consultation and further evaluation with  sigmoidoscopy/colonoscopy as clinically warranted.  2. Reticular airspace opacities in bilateral lower lobes, likely  favored to represent atelectasis. However developing pneumonia can  also be considered in the differential in appropriate clinical  setting.   [TL]   1256 Clinically patient does not have symptomatology consistent with pneumonia.  No leukocytosis, cough or shortness of breath or hypoxia.  No increased work of breathing.  Plan for disimpaction and group home made aware that he needs to continue with his oral bowel regimen and daily enemas are needed as well as GI follow-up. [TL]      ED Course User Index  [TL] Tim Montalvo DO         Diagnoses as of 09/22/24 1427   Constipation, unspecified constipation type   Fecal impaction (Multi)   Stercoral colitis        Medical Decision Making  59-year-old male presents for constipation, on exam his abdomen is distended, he was not tolerating p.o. at his facility.  Given abdominal distention, did order abdominal lab workup, CT abdomen to assess for possible bowel obstruction, acute intra-abdominal inflammatory pathologies causing his symptoms here today.    My independent interpretation labs, CBC shows no leukocytosis, essentially normal hemoglobin, normal platelets.  Chemistry is unremarkable no electrolyte derangements, no evidence of pancreatitis on CT scan, lipase negative.  No evidence of hepatic or renal derangements on chemistry.  CT abdomen shows potential stool faction in the rectum, stercoral proctocolitis.  I did attempt a manual disimpaction however I was unable to get any stool out or him disimpact anything given the stool was too far up in the patient's rectum.  Given workup was unremarkable aside from the proctocolitis findings, discharge patient home, I did put in the discharge paperwork and emphasized the patient's caretaker that he needs to be receiving MiraLAX twice a day,  receiving daily enemas at this facility, they will follow-up with the patient's primary, return for new, concerning worsening symptoms.        Patient and or family in agreement and understanding of treatment plan.  All questions answered.      I reviewed the case with the attending ED physician. The attending ED physician agrees with the plan. Patient and/or patient´s representative was counseled regarding labs, imaging, likely diagnosis, and plan. All questions were answered.    ED Medications administered this visit:    Medications   iohexol (OMNIPaque) 350 mg iodine/mL solution 75 mL (75 mL intravenous Given 9/22/24 1135)   lactated Ringer's bolus 1,000 mL (0 mL intravenous Stopped 9/22/24 1358)       New Prescriptions from this visit:    New Prescriptions    No medications on file       Follow-up:  Zachariah Humphrey MD  [address]  [facility] [address]  [phone]    Schedule an appointment as soon as possible for a visit in 3 days      Bella DEVLIN MD  [address] [facility] [address]  [facility] [id number] [phone]    Schedule an appointment as soon as possible for a visit in 1 week          Final Impression:   1. Constipation, unspecified constipation type    2. Fecal impaction (Multi)    3. Stercoral colitis          (Please note that portions of this note were completed with a voice recognition program.  Efforts were made to edit the dictations but occasionally words are mis-transcribed.)     Jose Billingsley DO  Resident  09/22/24 8025

## 2024-09-22 NOTE — DISCHARGE INSTRUCTIONS
[facility]  ENCOUNTER:    [facility] [address]  [address] [place] [address]  [phone]        SUBJECTIVE:  Chief Complaint   Patient presents with   • Follow-up     3 month    • Office Visit         Ms. Lagos  presented to my office for checkup, has been watching her diet exercise regularly, she lost 20 lb over the last 3 months.  Her cholesterol is normal now, her blood pressure is stable.  She is to have GERD and does not anymore, she stopped taking Pepcid.  She feels okay otherwise and denies any other issues or concerns     PAST HISTORIES:  I have reviewed with the patient the past medical history, medications, allergies and social history listed in the medical record as well as the nursing notes for this encounter.     MEDICATIONS:   Current Outpatient Medications   Medication Sig Dispense Refill   • lisinopril (ZESTRIL) 10 MG tablet Take 1 tablet by mouth daily. 90 tablet 2   • aspirin 81 MG EC tablet Take 1 tablet by mouth daily. 90 tablet 1     No current facility-administered medications for this visit.          ALLERGIES:   ALLERGIES:   Allergen Reactions   • Fruit & Vegetable (Food Or Med) HIVES     A type of Berry (CURRENT)   • Seasonal Other (See Comments)     Itchy watery eyes, runny nose, sinus pain and pressure, congestion           REVIEW OF SYSTEMS:   A 12 point review of systems is negative other than that mentioned above.     OBJECTIVE:  Visit Vitals  Visit Vitals  /80   Pulse 80   Temp 98.3 °F (36.8 °C) (Tympanic)   Ht 4' 11.5\" (1.511 m)   Wt 72.1 kg (159 lb)   LMP 10/01/2020 (Approximate)   BMI 31.58 kg/m²      General: Patient is sitting up in the chair in no acute distress.  Neck: Supple, trachea in midline. No goiter.  Cardiovascular: Heart is regular rate and rhythm, no murmurs. No edema.  Respiratory: Breathing is normal. Lungs are clear to  Patient should begin utilizing if he is not already on MiraLAX twice daily and should be getting daily enemas as well as increased fiber in his diet and increase oral fluids.  Recommend PCP follow-up and GI follow-up as well.   auscultation bilaterally. No wheezing.  GI:  Abdomen is soft, nontender. No hepatosplenomegaly.  Lymphatic: No lymphadenopathy.  Musculoskeletal: Good range of motion in all major joints. Gait is normal.  Skin: Warm and dry. No significant rash or masses noted.  Psych: Alert and oriented. Normal mood and affect.     INVESTIGATIONS:     Lab Services on 03/18/2023   Component Date Value Ref Range Status   • Fasting Status 03/18/2023 12  0 - 999 Hours Final   • Sodium 03/18/2023 143  135 - 145 mmol/L Final   • Potassium 03/18/2023 4.8  3.4 - 5.1 mmol/L Final   • Chloride 03/18/2023 106  97 - 110 mmol/L Final   • Carbon Dioxide 03/18/2023 28  21 - 32 mmol/L Final   • Anion Gap 03/18/2023 14  7 - 19 mmol/L Final   • Glucose 03/18/2023 87  70 - 99 mg/dL Final   • BUN 03/18/2023 14  6 - 20 mg/dL Final   • Creatinine 03/18/2023 0.95  0.51 - 0.95 mg/dL Final   • Glomerular Filtration Rate 03/18/2023 73  >=60 Final   • BUN/Cr 03/18/2023 15  7 - 25 Final   • Calcium 03/18/2023 9.4  8.4 - 10.2 mg/dL Final   • Bilirubin, Total 03/18/2023 0.4  0.2 - 1.0 mg/dL Final   • GOT/AST 03/18/2023 18  <=37 Units/L Final   • GPT/ALT 03/18/2023 27  <64 Units/L Final   • Alkaline Phosphatase 03/18/2023 59  45 - 117 Units/L Final   • Albumin 03/18/2023 3.9  3.6 - 5.1 g/dL Final   • Protein, Total 03/18/2023 6.6  6.4 - 8.2 g/dL Final   • Globulin 03/18/2023 2.7  2.0 - 4.0 g/dL Final   • A/G Ratio 03/18/2023 1.4  1.0 - 2.4 Final   • Fasting Status 03/18/2023 12  0 - 999 Hours Final   • Cholesterol 03/18/2023 197  <=199 mg/dL Final   • Triglycerides 03/18/2023 88  <=149 mg/dL Final   • HDL 03/18/2023 66  >=50 mg/dL Final   • LDL 03/18/2023 113  <=129 mg/dL Final   • Non-HDL Cholesterol 03/18/2023 131  mg/dL Final   • Cholesterol/ HDL Ratio 03/18/2023 3.0  <=4.4 Final   • TSH 03/18/2023 1.131  0.350 - 5.000 mcUnits/mL Final           ASSESSMENT:  Essential hypertension  (primary encounter diagnosis)  History of hyperlipidemia  History of  gastroesophageal reflux (GERD)       PLAN:  Was advised to continue to eat healthy and exercise regularly  No orders of the defined types were placed in this encounter.           Return if symptoms worsen or fail to improve, for May establish with another provider in 6 mo.        There are no Patient Instructions on file for this visit.      The patient indicates understanding of these issues and agrees with the plan, all His questions were answered to His satisfaction.

## 2024-09-23 LAB — HOLD SPECIMEN: NORMAL

## 2024-09-26 ENCOUNTER — SPECIALTY PHARMACY (OUTPATIENT)
Dept: PHARMACY | Facility: CLINIC | Age: 59
End: 2024-09-26

## 2024-09-26 PROCEDURE — RXMED WILLOW AMBULATORY MEDICATION CHARGE

## 2024-10-03 ENCOUNTER — PHARMACY VISIT (OUTPATIENT)
Dept: PHARMACY | Facility: CLINIC | Age: 59
End: 2024-10-03
Payer: MEDICAID

## 2024-10-23 ENCOUNTER — APPOINTMENT (OUTPATIENT)
Dept: RADIOLOGY | Facility: HOSPITAL | Age: 59
End: 2024-10-23
Payer: MEDICAID

## 2024-10-23 ENCOUNTER — APPOINTMENT (OUTPATIENT)
Dept: CARDIOLOGY | Facility: HOSPITAL | Age: 59
End: 2024-10-23
Payer: MEDICAID

## 2024-10-23 ENCOUNTER — HOSPITAL ENCOUNTER (OUTPATIENT)
Facility: HOSPITAL | Age: 59
Setting detail: OBSERVATION
Discharge: NURSING, REHAB OR LONG-TERM CARE | End: 2024-10-25
Attending: STUDENT IN AN ORGANIZED HEALTH CARE EDUCATION/TRAINING PROGRAM | Admitting: INTERNAL MEDICINE
Payer: MEDICAID

## 2024-10-23 DIAGNOSIS — K56.41 FECAL IMPACTION (MULTI): Primary | ICD-10-CM

## 2024-10-23 DIAGNOSIS — K52.89 STERCORAL COLITIS: ICD-10-CM

## 2024-10-23 LAB
BASOPHILS # BLD AUTO: 0.01 X10*3/UL (ref 0–0.1)
BASOPHILS NFR BLD AUTO: 0.3 %
EOSINOPHIL # BLD AUTO: 0.21 X10*3/UL (ref 0–0.7)
EOSINOPHIL NFR BLD AUTO: 5.6 %
ERYTHROCYTE [DISTWIDTH] IN BLOOD BY AUTOMATED COUNT: 14.9 % (ref 11.5–14.5)
HCT VFR BLD AUTO: 43.6 % (ref 41–52)
HGB BLD-MCNC: 14.3 G/DL (ref 13.5–17.5)
IMM GRANULOCYTES # BLD AUTO: 0 X10*3/UL (ref 0–0.7)
IMM GRANULOCYTES NFR BLD AUTO: 0 % (ref 0–0.9)
LYMPHOCYTES # BLD AUTO: 1.7 X10*3/UL (ref 1.2–4.8)
LYMPHOCYTES NFR BLD AUTO: 45.3 %
MCH RBC QN AUTO: 26.4 PG (ref 26–34)
MCHC RBC AUTO-ENTMCNC: 32.8 G/DL (ref 32–36)
MCV RBC AUTO: 80 FL (ref 80–100)
MONOCYTES # BLD AUTO: 0.18 X10*3/UL (ref 0.1–1)
MONOCYTES NFR BLD AUTO: 4.8 %
NEUTROPHILS # BLD AUTO: 1.65 X10*3/UL (ref 1.2–7.7)
NEUTROPHILS NFR BLD AUTO: 44 %
NRBC BLD-RTO: 0 /100 WBCS (ref 0–0)
PLATELET # BLD AUTO: 159 X10*3/UL (ref 150–450)
RBC # BLD AUTO: 5.42 X10*6/UL (ref 4.5–5.9)
WBC # BLD AUTO: 3.8 X10*3/UL (ref 4.4–11.3)

## 2024-10-23 PROCEDURE — 36415 COLL VENOUS BLD VENIPUNCTURE: CPT | Performed by: STUDENT IN AN ORGANIZED HEALTH CARE EDUCATION/TRAINING PROGRAM

## 2024-10-23 PROCEDURE — 74176 CT ABD & PELVIS W/O CONTRAST: CPT

## 2024-10-23 PROCEDURE — 99285 EMERGENCY DEPT VISIT HI MDM: CPT | Mod: 25

## 2024-10-23 PROCEDURE — 80053 COMPREHEN METABOLIC PANEL: CPT | Performed by: STUDENT IN AN ORGANIZED HEALTH CARE EDUCATION/TRAINING PROGRAM

## 2024-10-23 PROCEDURE — 99285 EMERGENCY DEPT VISIT HI MDM: CPT | Performed by: STUDENT IN AN ORGANIZED HEALTH CARE EDUCATION/TRAINING PROGRAM

## 2024-10-23 PROCEDURE — 83930 ASSAY OF BLOOD OSMOLALITY: CPT | Mod: STJLAB | Performed by: NURSE PRACTITIONER

## 2024-10-23 PROCEDURE — 74176 CT ABD & PELVIS W/O CONTRAST: CPT | Mod: FOREIGN READ | Performed by: RADIOLOGY

## 2024-10-23 PROCEDURE — 85025 COMPLETE CBC W/AUTO DIFF WBC: CPT | Performed by: STUDENT IN AN ORGANIZED HEALTH CARE EDUCATION/TRAINING PROGRAM

## 2024-10-23 PROCEDURE — 83690 ASSAY OF LIPASE: CPT | Performed by: STUDENT IN AN ORGANIZED HEALTH CARE EDUCATION/TRAINING PROGRAM

## 2024-10-23 PROCEDURE — 93005 ELECTROCARDIOGRAM TRACING: CPT

## 2024-10-23 ASSESSMENT — PAIN SCALES - GENERAL: PAINLEVEL_OUTOF10: 0 - NO PAIN

## 2024-10-23 ASSESSMENT — COLUMBIA-SUICIDE SEVERITY RATING SCALE - C-SSRS
1. IN THE PAST MONTH, HAVE YOU WISHED YOU WERE DEAD OR WISHED YOU COULD GO TO SLEEP AND NOT WAKE UP?: NO
6. HAVE YOU EVER DONE ANYTHING, STARTED TO DO ANYTHING, OR PREPARED TO DO ANYTHING TO END YOUR LIFE?: NO
2. HAVE YOU ACTUALLY HAD ANY THOUGHTS OF KILLING YOURSELF?: NO

## 2024-10-23 ASSESSMENT — LIFESTYLE VARIABLES
EVER FELT BAD OR GUILTY ABOUT YOUR DRINKING: NO
EVER HAD A DRINK FIRST THING IN THE MORNING TO STEADY YOUR NERVES TO GET RID OF A HANGOVER: NO
TOTAL SCORE: 0
HAVE YOU EVER FELT YOU SHOULD CUT DOWN ON YOUR DRINKING: NO
HAVE PEOPLE ANNOYED YOU BY CRITICIZING YOUR DRINKING: NO

## 2024-10-23 ASSESSMENT — PAIN - FUNCTIONAL ASSESSMENT: PAIN_FUNCTIONAL_ASSESSMENT: 0-10

## 2024-10-23 NOTE — ED PROVIDER NOTES
EMERGENCY DEPARTMENT ENCOUNTER      Pt Name: Maximo Pablo  MRN: [id number]  Birthdate 1965  Date of evaluation: 10/23/2024  Provider: Danuta Mari MD    CHIEF COMPLAINT       Chief Complaint   Patient presents with    Fecal Impaction     Per caregiver, patient has had hx of bowel obstruction. No BM today with enema.      HISTORY OF PRESENT ILLNESS    Maximo Pablo is a 59 y.o. year old male who presents to the ER for possible fecal and impaction, he was referred to the ED by his PCP.  His father reports that he has not been eating for the past 3 days, he has not been passing gas for the past 3 days.  The patient has been given an MRI enema yesterday and he had 2 bowel movements that were small.  The patient had an enema today which did not work which was why he was brought into the ED.    The patient's past medical history is significant for epilepsy, hyperlipidemia, hypertension, developmental delay.     PAST MEDICAL HISTORY     Past Medical History:   Diagnosis Date    Epilepsy, unspecified, not intractable, without status epilepticus (Multi)     Epilepsy    Hyperlipidemia, unspecified     Dyslipidemia    Personal history of other diseases of the circulatory system     History of hypertension    Personal history of other diseases of the nervous system and sense organs     History of cerebral palsy    Personal history of other endocrine, nutritional and metabolic disease     History of vitamin D deficiency    Personal history of other specified conditions     History of dysphagia     CURRENT MEDICATIONS       Previous Medications    AMLODIPINE (NORVASC) 10 MG TABLET    Take 1 tablet (10 mg) by mouth once daily.    ASPIRIN 81 MG CHEWABLE TABLET    Chew 1 tablet (81 mg) once daily.    CHLORHEXIDINE (PERIDEX) 0.12 % SOLUTION    Take 15 mL by mouth twice a day.    CHOLECALCIFEROL (VITAMIN D-3) 25 MCG (1000 UT) TABLET    Take 1 tablet (25 mcg) by mouth once daily.    CLONIDINE (CATAPRES) 0.2 MG TABLET     Take 1 tablet (0.2 mg) by mouth 2 times a day.    DUPILUMAB (DUPIXENT PEN) 300 MG/2 ML PEN INJECTOR    INJECT 300MG (1 PEN) UNDER THE SKIN EVERY OTHER WEEK.    HYDROCHLOROTHIAZIDE (HYDRODIURIL) 12.5 MG TABLET    Take 1 tablet (12.5 mg) by mouth once daily.    LABETALOL (NORMODYNE) 100 MG TABLET    Take 1 tablet (100 mg) by mouth 2 times a day.    LEVETIRACETAM (KEPPRA) 500 MG TABLET    Take 1 tablet (500 mg) by mouth twice a day.    LISINOPRIL 20 MG TABLET    Take 1 tablet (20 mg) by mouth 2 times a day.    MULTIVITAMIN WITH MINERALS TABLET    Take 1 tablet by mouth once daily.    OMEPRAZOLE (PRILOSEC) 20 MG DR CAPSULE    Take 2 capsules (40 mg) by mouth once daily. Do not crush or chew.    OXCARBAZEPINE (TRILEPTAL) 150 MG TABLET    Take 1 tablet (150 mg) by mouth 2 times a day. Total of 450mg    OXCARBAZEPINE (TRILEPTAL) 300 MG TABLET    Take 1 tablet (300 mg) by mouth 2 times a day. Total of 450mg    POLYETHYLENE GLYCOL (GLYCOLAX, MIRALAX) 17 GRAM/DOSE POWDER    Take 17 g by mouth once daily.    POTASSIUM CHLORIDE ER (MICRO-K) 10 MEQ ER CAPSULE    Take 1 capsule (10 mEq) by mouth once daily at bedtime.    POTASSIUM CHLORIDE ER (MICRO-K) 10 MEQ ER CAPSULE    Take 2 capsules (20 mEq) by mouth once daily in the morning.    SENNOSIDES-DOCUSATE SODIUM (EDY-COLACE) 8.6-50 MG TABLET    Take 1 tablet by mouth 2 times a day.     SURGICAL HISTORY       Past Surgical History:   Procedure Laterality Date    OTHER SURGICAL HISTORY  11/26/2019    No history of surgery     ALLERGIES     Patient has no known allergies.  FAMILY HISTORY     No family history on file.  SOCIAL HISTORY       Social History     Tobacco Use    Smoking status: Never    Smokeless tobacco: Never   Substance Use Topics    Alcohol use: Defer    Drug use: Defer     PHYSICAL EXAM  (up to 7 for level 4, 8 or more for level 5)     ED Triage Vitals [10/23/24 1741]   Temperature Heart Rate Respirations BP   36.5 °C (97.7 °F) 50 18 (!) 174/97      Pulse Ox Temp  Source Heart Rate Source Patient Position   99 % Temporal Monitor Sitting      BP Location FiO2 (%)     Right arm --       Physical Exam  Constitutional:       Comments: Patient is developmentally delayed.   HENT:      Head: Microcephalic.      Nose: Nose normal.      Mouth/Throat:      Mouth: Mucous membranes are moist.   Eyes:      General:         Right eye: No discharge.         Left eye: No discharge.      Conjunctiva/sclera: Conjunctivae normal.   Cardiovascular:      Rate and Rhythm: Normal rate and regular rhythm.      Pulses: Normal pulses.      Heart sounds: Normal heart sounds.   Pulmonary:      Effort: Pulmonary effort is normal.      Breath sounds: Normal breath sounds. No stridor. No wheezing or rhonchi.   Abdominal:      General: Abdomen is flat. There is distension.      Tenderness: There is no abdominal tenderness. There is no guarding.   Musculoskeletal:      Cervical back: No rigidity.      Comments: Patient is contracted.   Skin:     General: Skin is warm and dry.   Neurological:      Mental Status: He is alert. Mental status is at baseline.        DIAGNOSTIC RESULTS   LABS:  Labs Reviewed   CBC WITH AUTO DIFFERENTIAL - Abnormal       Result Value    WBC 3.8 (*)     nRBC 0.0      RBC 5.42      Hemoglobin 14.3      Hematocrit 43.6      MCV 80      MCH 26.4      MCHC 32.8      RDW 14.9 (*)     Platelets 159      Neutrophils % 44.0      Immature Granulocytes %, Automated 0.0      Lymphocytes % 45.3      Monocytes % 4.8      Eosinophils % 5.6      Basophils % 0.3      Neutrophils Absolute 1.65      Immature Granulocytes Absolute, Automated 0.00      Lymphocytes Absolute 1.70      Monocytes Absolute 0.18      Eosinophils Absolute 0.21      Basophils Absolute 0.01     COMPREHENSIVE METABOLIC PANEL - Abnormal    Glucose 96      Sodium 130 (*)     Potassium 4.6      Chloride 93 (*)     Bicarbonate 30      Anion Gap 12      Urea Nitrogen 6      Creatinine 0.71      eGFR >90      Calcium 10.5 (*)      Albumin 4.3      Alkaline Phosphatase 231 (*)     Total Protein 8.5 (*)     AST 42 (*)     Bilirubin, Total 0.2      ALT 47     LIPASE - Normal    Lipase 33      Narrative:     Venipuncture immediately after or during the administration of Metamizole may lead to falsely low results. Testing should be performed immediately prior to Metamizole dosing.     All other labs were within normal range or not returned as of this dictation.  Imaging  CT abdomen pelvis wo IV contrast   Final Result   Severe constipation with fecal impaction and possible stercoral   colitis.   Signed by Dylon Barnett MD         Procedure  Procedures  EMERGENCY DEPARTMENT COURSE/MDM:   Medical Decision Making  Patient is a 59-year-old male who comes to the ED for possible fecal impaction.  The differential diagnosis include small bowel obstruction, constipation, malrotation, ileus.    On physical exam the patient is contracted, unable to straighten his extremities.  The abdomen was firm to palpation and tympanic.     IV was unable to be placed due to the patient's contracted limbs.  CT of the abdomen pelvis was ordered and showed severe constipation with fecal impaction and possible colitis.        Vitals:    Vitals:    10/23/24 1741   BP: (!) 174/97   BP Location: Right arm   Patient Position: Sitting   Pulse: 50   Resp: 18   Temp: 36.5 °C (97.7 °F)   TempSrc: Temporal   SpO2: 99%   Weight: 79.8 kg (176 lb)       ED Course as of 10/25/24 0858   Thu Oct 24, 2024   0052 Severe constipation with fecal impaction and possible stercoral  Colitis on CT     [GV]      ED Course User Index  [GV] Danuta Mari MD         Diagnoses as of 10/25/24 0858   Fecal impaction (Multi)   Stercoral colitis           External Records Reviewed: I reviewed recent and relevant outside records including inpatient notes, outpatient records      Shared decision making for disposition  Patient and/or patient´s representative was counseled regarding labs, imaging, likely  diagnosis. All questions were answered.  ED Medications administered this visit:  Medications - No data to display    New Prescriptions from this visit:    New Prescriptions    No medications on file       Follow-up:  No follow-up provider specified.      Final Impression: No diagnosis found.      Please excuse any misspellings or unintended errors related to the Dragon speech recognition software used to dictate this note.    I reviewed the case with the attending ED physician. The attending ED physician agrees with the plan.      Danuta Mari MD  Resident  10/25/24 5610      Gina:  I received sign out on this patient, pending CT scan for disposition.    The patient was seen by the resident/fellow.  I have personally performed a substantive portion of the encounter.  I have seen and examined the patient; agree with the workup, evaluation, MDM, management and diagnosis.  The care plan has been discussed with the resident; I have reviewed the resident’s note and agree with the documented findings.      While in the emergency room, the patient has not eaten, or drank, and continues to have some abdominal discomfort.  There is scleral colitis noted on the patient's abdomen however he does not have peritoneal signs.  I believe this may just be chronic constipation, and fecal impaction.  The patient be admitted to the hospital for a full bowel cleanout and further evaluation.                           Rodger Fuentes, DO  10/25/24 8717

## 2024-10-24 ENCOUNTER — APPOINTMENT (OUTPATIENT)
Dept: RADIOLOGY | Facility: HOSPITAL | Age: 59
End: 2024-10-24
Payer: MEDICAID

## 2024-10-24 PROBLEM — K56.41 FECAL IMPACTION (MULTI): Status: ACTIVE | Noted: 2024-10-24

## 2024-10-24 PROBLEM — K52.89 STERCORAL COLITIS: Status: ACTIVE | Noted: 2024-10-24

## 2024-10-24 LAB
ALBUMIN SERPL BCP-MCNC: 4.3 G/DL (ref 3.4–5)
ALP SERPL-CCNC: 231 U/L (ref 33–120)
ALT SERPL W P-5'-P-CCNC: 47 U/L (ref 10–52)
ANION GAP SERPL CALC-SCNC: 12 MMOL/L (ref 10–20)
ANION GAP SERPL CALC-SCNC: 12 MMOL/L (ref 10–20)
ANION GAP SERPL CALC-SCNC: 13 MMOL/L (ref 10–20)
AST SERPL W P-5'-P-CCNC: 42 U/L (ref 9–39)
BILIRUB SERPL-MCNC: 0.2 MG/DL (ref 0–1.2)
BUN SERPL-MCNC: 6 MG/DL (ref 6–23)
CALCIUM SERPL-MCNC: 10.3 MG/DL (ref 8.6–10.3)
CALCIUM SERPL-MCNC: 10.5 MG/DL (ref 8.6–10.3)
CALCIUM SERPL-MCNC: 10.5 MG/DL (ref 8.6–10.3)
CHLORIDE SERPL-SCNC: 90 MMOL/L (ref 98–107)
CHLORIDE SERPL-SCNC: 91 MMOL/L (ref 98–107)
CHLORIDE SERPL-SCNC: 93 MMOL/L (ref 98–107)
CHLORIDE UR-SCNC: 132 MMOL/L
CHLORIDE/CREATININE (MMOL/G) IN URINE: 184 MMOL/G CREAT (ref 23–275)
CO2 SERPL-SCNC: 29 MMOL/L (ref 21–32)
CO2 SERPL-SCNC: 30 MMOL/L (ref 21–32)
CO2 SERPL-SCNC: 30 MMOL/L (ref 21–32)
CREAT SERPL-MCNC: 0.71 MG/DL (ref 0.5–1.3)
CREAT SERPL-MCNC: 0.72 MG/DL (ref 0.5–1.3)
CREAT SERPL-MCNC: 0.73 MG/DL (ref 0.5–1.3)
CREAT UR-MCNC: 71.8 MG/DL (ref 20–370)
EGFRCR SERPLBLD CKD-EPI 2021: >90 ML/MIN/1.73M*2
ERYTHROCYTE [DISTWIDTH] IN BLOOD BY AUTOMATED COUNT: 14.7 % (ref 11.5–14.5)
GLUCOSE SERPL-MCNC: 113 MG/DL (ref 74–99)
GLUCOSE SERPL-MCNC: 120 MG/DL (ref 74–99)
GLUCOSE SERPL-MCNC: 96 MG/DL (ref 74–99)
HCT VFR BLD AUTO: 42.9 % (ref 41–52)
HGB BLD-MCNC: 14.3 G/DL (ref 13.5–17.5)
HOLD SPECIMEN: NORMAL
HOLD SPECIMEN: NORMAL
LIPASE SERPL-CCNC: 33 U/L (ref 9–82)
MAGNESIUM SERPL-MCNC: 1.94 MG/DL (ref 1.6–2.4)
MCH RBC QN AUTO: 26.5 PG (ref 26–34)
MCHC RBC AUTO-ENTMCNC: 33.3 G/DL (ref 32–36)
MCV RBC AUTO: 79 FL (ref 80–100)
NRBC BLD-RTO: 0 /100 WBCS (ref 0–0)
PHOSPHATE SERPL-MCNC: 3.7 MG/DL (ref 2.5–4.9)
PLATELET # BLD AUTO: 195 X10*3/UL (ref 150–450)
POTASSIUM SERPL-SCNC: 4 MMOL/L (ref 3.5–5.3)
POTASSIUM SERPL-SCNC: 4.1 MMOL/L (ref 3.5–5.3)
POTASSIUM SERPL-SCNC: 4.6 MMOL/L (ref 3.5–5.3)
POTASSIUM UR-SCNC: 109 MMOL/L
POTASSIUM/CREAT UR-RTO: 152 MMOL/G CREAT
PROT SERPL-MCNC: 8.5 G/DL (ref 6.4–8.2)
RBC # BLD AUTO: 5.4 X10*6/UL (ref 4.5–5.9)
SODIUM SERPL-SCNC: 128 MMOL/L (ref 136–145)
SODIUM SERPL-SCNC: 129 MMOL/L (ref 136–145)
SODIUM SERPL-SCNC: 130 MMOL/L (ref 136–145)
SODIUM UR-SCNC: 99 MMOL/L
SODIUM/CREAT UR-RTO: 138 MMOL/G CREAT
WBC # BLD AUTO: 5.1 X10*3/UL (ref 4.4–11.3)

## 2024-10-24 PROCEDURE — G0378 HOSPITAL OBSERVATION PER HR: HCPCS

## 2024-10-24 PROCEDURE — 80048 BASIC METABOLIC PNL TOTAL CA: CPT | Performed by: NURSE PRACTITIONER

## 2024-10-24 PROCEDURE — 36415 COLL VENOUS BLD VENIPUNCTURE: CPT | Performed by: NURSE PRACTITIONER

## 2024-10-24 PROCEDURE — 96374 THER/PROPH/DIAG INJ IV PUSH: CPT

## 2024-10-24 PROCEDURE — 2500000002 HC RX 250 W HCPCS SELF ADMINISTERED DRUGS (ALT 637 FOR MEDICARE OP, ALT 636 FOR OP/ED): Performed by: NURSE PRACTITIONER

## 2024-10-24 PROCEDURE — 96361 HYDRATE IV INFUSION ADD-ON: CPT

## 2024-10-24 PROCEDURE — 74018 RADEX ABDOMEN 1 VIEW: CPT

## 2024-10-24 PROCEDURE — 2500000001 HC RX 250 WO HCPCS SELF ADMINISTERED DRUGS (ALT 637 FOR MEDICARE OP)

## 2024-10-24 PROCEDURE — 2500000004 HC RX 250 GENERAL PHARMACY W/ HCPCS (ALT 636 FOR OP/ED): Performed by: NURSE PRACTITIONER

## 2024-10-24 PROCEDURE — 83735 ASSAY OF MAGNESIUM: CPT | Performed by: NURSE PRACTITIONER

## 2024-10-24 PROCEDURE — 84100 ASSAY OF PHOSPHORUS: CPT | Performed by: NURSE PRACTITIONER

## 2024-10-24 PROCEDURE — 74018 RADEX ABDOMEN 1 VIEW: CPT | Performed by: RADIOLOGY

## 2024-10-24 PROCEDURE — 85027 COMPLETE CBC AUTOMATED: CPT

## 2024-10-24 PROCEDURE — 83935 ASSAY OF URINE OSMOLALITY: CPT | Mod: STJLAB | Performed by: NURSE PRACTITIONER

## 2024-10-24 PROCEDURE — 2500000005 HC RX 250 GENERAL PHARMACY W/O HCPCS: Performed by: NURSE PRACTITIONER

## 2024-10-24 PROCEDURE — 2500000004 HC RX 250 GENERAL PHARMACY W/ HCPCS (ALT 636 FOR OP/ED)

## 2024-10-24 PROCEDURE — 84300 ASSAY OF URINE SODIUM: CPT | Performed by: NURSE PRACTITIONER

## 2024-10-24 PROCEDURE — 2500000001 HC RX 250 WO HCPCS SELF ADMINISTERED DRUGS (ALT 637 FOR MEDICARE OP): Performed by: NURSE PRACTITIONER

## 2024-10-24 RX ORDER — POLYETHYLENE GLYCOL 3350 17 G/17G
17 POWDER, FOR SOLUTION ORAL 2 TIMES DAILY
Status: DISCONTINUED | OUTPATIENT
Start: 2024-10-24 | End: 2024-10-25 | Stop reason: HOSPADM

## 2024-10-24 RX ORDER — ENOXAPARIN SODIUM 100 MG/ML
40 INJECTION SUBCUTANEOUS EVERY 24 HOURS
Status: DISCONTINUED | OUTPATIENT
Start: 2024-10-24 | End: 2024-10-25 | Stop reason: HOSPADM

## 2024-10-24 RX ORDER — ACETAMINOPHEN 650 MG/1
650 SUPPOSITORY RECTAL EVERY 6 HOURS PRN
Status: DISCONTINUED | OUTPATIENT
Start: 2024-10-24 | End: 2024-10-24

## 2024-10-24 RX ORDER — SODIUM CHLORIDE 9 MG/ML
75 INJECTION, SOLUTION INTRAVENOUS CONTINUOUS
Status: ACTIVE | OUTPATIENT
Start: 2024-10-24 | End: 2024-10-25

## 2024-10-24 RX ORDER — NAPROXEN SODIUM 220 MG/1
81 TABLET, FILM COATED ORAL
Status: DISCONTINUED | OUTPATIENT
Start: 2024-10-24 | End: 2024-10-25 | Stop reason: HOSPADM

## 2024-10-24 RX ORDER — BISACODYL 10 MG/1
10 SUPPOSITORY RECTAL ONCE
Status: COMPLETED | OUTPATIENT
Start: 2024-10-24 | End: 2024-10-24

## 2024-10-24 RX ORDER — LEVETIRACETAM 500 MG/1
500 TABLET ORAL 2 TIMES DAILY
Status: DISCONTINUED | OUTPATIENT
Start: 2024-10-24 | End: 2024-10-25 | Stop reason: HOSPADM

## 2024-10-24 RX ORDER — PANTOPRAZOLE SODIUM 40 MG/1
40 TABLET, DELAYED RELEASE ORAL
Status: DISCONTINUED | OUTPATIENT
Start: 2024-10-25 | End: 2024-10-25 | Stop reason: HOSPADM

## 2024-10-24 RX ORDER — CHLORHEXIDINE GLUCONATE ORAL RINSE 1.2 MG/ML
15 SOLUTION DENTAL 2 TIMES DAILY
Status: DISCONTINUED | OUTPATIENT
Start: 2024-10-24 | End: 2024-10-25 | Stop reason: HOSPADM

## 2024-10-24 RX ORDER — OXCARBAZEPINE 150 MG/1
150 TABLET, FILM COATED ORAL 2 TIMES DAILY
Status: DISCONTINUED | OUTPATIENT
Start: 2024-10-24 | End: 2024-10-25 | Stop reason: HOSPADM

## 2024-10-24 RX ORDER — HYDRALAZINE HYDROCHLORIDE 20 MG/ML
10 INJECTION INTRAMUSCULAR; INTRAVENOUS ONCE
Status: COMPLETED | OUTPATIENT
Start: 2024-10-24 | End: 2024-10-24

## 2024-10-24 RX ORDER — ACETAMINOPHEN 325 MG/1
650 TABLET ORAL EVERY 6 HOURS PRN
Status: DISCONTINUED | OUTPATIENT
Start: 2024-10-24 | End: 2024-10-24

## 2024-10-24 RX ORDER — LACTULOSE 10 G/15ML
20 SOLUTION ORAL ONCE
Status: COMPLETED | OUTPATIENT
Start: 2024-10-24 | End: 2024-10-24

## 2024-10-24 RX ORDER — AMOXICILLIN 250 MG
1 CAPSULE ORAL 2 TIMES DAILY
Status: DISCONTINUED | OUTPATIENT
Start: 2024-10-24 | End: 2024-10-25 | Stop reason: HOSPADM

## 2024-10-24 RX ORDER — LABETALOL 100 MG/1
100 TABLET, FILM COATED ORAL 2 TIMES DAILY
Status: DISCONTINUED | OUTPATIENT
Start: 2024-10-24 | End: 2024-10-25 | Stop reason: HOSPADM

## 2024-10-24 RX ORDER — ACETAMINOPHEN 160 MG/5ML
650 SOLUTION ORAL EVERY 6 HOURS PRN
Status: DISCONTINUED | OUTPATIENT
Start: 2024-10-24 | End: 2024-10-24

## 2024-10-24 RX ORDER — OXCARBAZEPINE 300 MG/1
300 TABLET, FILM COATED ORAL 2 TIMES DAILY
Status: DISCONTINUED | OUTPATIENT
Start: 2024-10-24 | End: 2024-10-25 | Stop reason: HOSPADM

## 2024-10-24 RX ORDER — SYRING-NEEDL,DISP,INSUL,0.3 ML 29 G X1/2"
296 SYRINGE, EMPTY DISPOSABLE MISCELLANEOUS ONCE
Status: COMPLETED | OUTPATIENT
Start: 2024-10-24 | End: 2024-10-24

## 2024-10-24 RX ORDER — HYDROCHLOROTHIAZIDE 12.5 MG/1
12.5 TABLET ORAL DAILY
Status: DISCONTINUED | OUTPATIENT
Start: 2024-10-24 | End: 2024-10-25 | Stop reason: HOSPADM

## 2024-10-24 RX ORDER — LISINOPRIL 20 MG/1
20 TABLET ORAL 2 TIMES DAILY
Status: DISCONTINUED | OUTPATIENT
Start: 2024-10-24 | End: 2024-10-25 | Stop reason: HOSPADM

## 2024-10-24 RX ORDER — AMLODIPINE BESYLATE 10 MG/1
10 TABLET ORAL DAILY
Status: DISCONTINUED | OUTPATIENT
Start: 2024-10-24 | End: 2024-10-25 | Stop reason: HOSPADM

## 2024-10-24 RX ORDER — CLONIDINE HYDROCHLORIDE 0.2 MG/1
0.2 TABLET ORAL 2 TIMES DAILY
Status: DISCONTINUED | OUTPATIENT
Start: 2024-10-24 | End: 2024-10-25 | Stop reason: HOSPADM

## 2024-10-24 RX ORDER — TALC
3 POWDER (GRAM) TOPICAL NIGHTLY PRN
Status: DISCONTINUED | OUTPATIENT
Start: 2024-10-24 | End: 2024-10-25 | Stop reason: HOSPADM

## 2024-10-24 ASSESSMENT — COGNITIVE AND FUNCTIONAL STATUS - GENERAL
DRESSING REGULAR LOWER BODY CLOTHING: TOTAL
DAILY ACTIVITIY SCORE: 6
TURNING FROM BACK TO SIDE WHILE IN FLAT BAD: TOTAL
WALKING IN HOSPITAL ROOM: TOTAL
DRESSING REGULAR LOWER BODY CLOTHING: TOTAL
TOILETING: TOTAL
HELP NEEDED FOR BATHING: TOTAL
MOVING TO AND FROM BED TO CHAIR: TOTAL
HELP NEEDED FOR BATHING: TOTAL
DRESSING REGULAR UPPER BODY CLOTHING: TOTAL
WALKING IN HOSPITAL ROOM: TOTAL
DAILY ACTIVITIY SCORE: 6
STANDING UP FROM CHAIR USING ARMS: TOTAL
EATING MEALS: TOTAL
CLIMB 3 TO 5 STEPS WITH RAILING: TOTAL
EATING MEALS: TOTAL
CLIMB 3 TO 5 STEPS WITH RAILING: TOTAL
TURNING FROM BACK TO SIDE WHILE IN FLAT BAD: TOTAL
MOVING FROM LYING ON BACK TO SITTING ON SIDE OF FLAT BED WITH BEDRAILS: TOTAL
STANDING UP FROM CHAIR USING ARMS: TOTAL
MOBILITY SCORE: 6
MOVING FROM LYING ON BACK TO SITTING ON SIDE OF FLAT BED WITH BEDRAILS: TOTAL
PERSONAL GROOMING: TOTAL
DRESSING REGULAR UPPER BODY CLOTHING: TOTAL
PERSONAL GROOMING: TOTAL
TOILETING: TOTAL

## 2024-10-24 ASSESSMENT — PAIN SCALES - WONG BAKER: WONGBAKER_NUMERICALRESPONSE: NO HURT

## 2024-10-24 ASSESSMENT — ACTIVITIES OF DAILY LIVING (ADL): LACK_OF_TRANSPORTATION: NO

## 2024-10-24 ASSESSMENT — PAIN SCALES - GENERAL: PAINLEVEL_OUTOF10: 0 - NO PAIN

## 2024-10-24 NOTE — PROGRESS NOTES
10/24/24 1016   Discharge Planning   Living Arrangements Other (Comment)  (group home)   Support Systems Organized support group (Comment)   Assistance Needed yes   Type of Residence [facility]   Care Facility Name [facility] or Post Acute Services None   Expected Discharge Disposition Home   Does the patient need discharge transport arranged? Yes   RoundTrip coordination needed? Yes   Has discharge transport been arranged? No   Financial Resource Strain   How hard is it for you to pay for the very basics like food, housing, medical care, and heating? Not hard   Housing Stability   In the last 12 months, was there a time when you were not able to pay the mortgage or rent on time? N   In the past 12 months, how many times have you moved where you were living? 1   At any time in the past 12 months, were you homeless or living in a shelter (including now)? N   Transportation Needs   In the past 12 months, has lack of transportation kept you from medical appointments or from getting medications? no   In the past 12 months, has lack of transportation kept you from meetings, work, or from getting things needed for daily living? No     Spoke to Aminah, nurse at [facility], she states patient has been a resident at their facility and plan is for him to return when medically ready. Per Aminah patient is wheelchair bound and they provide total care for him. She stated they may be able to provide transport back to [facility] depending on the day/time of d/c.     1145: Spoke to Michelle Griggs, she is patient's cousin and [employer]PO[employer]. She verified that plan is for patient to return to [facility] and is requesting to be informed with results. Nursing made aware.

## 2024-10-24 NOTE — PROGRESS NOTES
Emergency Medicine Transition of Care Note.    I received Maximo Pablo in signout from previous team.  Please see the previous ED provider note for all HPI, PE and MDM up to the time of signout at 12 AM. This is in addition to the primary record.    In brief Maximo Pablo is an 59 y.o. male with a past medical history significant for cerebral palsy, epilepsy, developmental delay, HLD, and HTN presenting for concern for possible fecal impaction.  Sent by his PCP due to not eating for the past 3 days.  Has not had a bowel movement or passed any gas.  Received an enema yesterday and had 2 small bowel movements.  Had another enema today which did not work and he therefore came to the ED for further evaluation.    Workup in the ED consisted of labs and imaging.  CBC with mild leukopenia of 3.8.  Otherwise unremarkable.  CMP with hyponatremia of 130.  Alk phos elevated at 231 and AST elevated at 42.  Lipase normal.  CT of the a/p pending.  At the time of signout we were awaiting: CT scan and reevaluation followed by discharge versus admission.    ED Course as of 10/25/24 1308   Thu Oct 24, 2024   0052 Severe constipation with fecal impaction and possible stercoral  Colitis on CT     [GV]      ED Course User Index  [GV] Danuta Mari MD         Diagnoses as of 10/25/24 1308   Fecal impaction (Multi)   Stercoral colitis       Labs Reviewed   CBC WITH AUTO DIFFERENTIAL - Abnormal       Result Value    WBC 3.8 (*)     nRBC 0.0      RBC 5.42      Hemoglobin 14.3      Hematocrit 43.6      MCV 80      MCH 26.4      MCHC 32.8      RDW 14.9 (*)     Platelets 159      Neutrophils % 44.0      Immature Granulocytes %, Automated 0.0      Lymphocytes % 45.3      Monocytes % 4.8      Eosinophils % 5.6      Basophils % 0.3      Neutrophils Absolute 1.65      Immature Granulocytes Absolute, Automated 0.00      Lymphocytes Absolute 1.70      Monocytes Absolute 0.18      Eosinophils Absolute 0.21      Basophils Absolute 0.01      COMPREHENSIVE METABOLIC PANEL - Abnormal    Glucose 96      Sodium 130 (*)     Potassium 4.6      Chloride 93 (*)     Bicarbonate 30      Anion Gap 12      Urea Nitrogen 6      Creatinine 0.71      eGFR >90      Calcium 10.5 (*)     Albumin 4.3      Alkaline Phosphatase 231 (*)     Total Protein 8.5 (*)     AST 42 (*)     Bilirubin, Total 0.2      ALT 47     LIPASE - Normal    Lipase 33      Narrative:     Venipuncture immediately after or during the administration of Metamizole may lead to falsely low results. Testing should be performed immediately prior to Metamizole dosing.       CT abdomen pelvis wo IV contrast   Final Result   Severe constipation with fecal impaction and possible stercoral   colitis.   Signed by Dylon Barnett MD            Medical Decision Making  CT revealed severe constipation with fecal impaction and possible stercoral colitis.  Upon reassessment, patient still has not had a bowel movement.  Therefore, request to inpatient admission placed.  Patient was accepted to general medicine under the care team of Dr. Husain for further evaluation and management of these findings and patient's symptoms.        Final diagnoses:   [K56.41] Fecal impaction (Multi)   [K52.89] Stercoral colitis           Procedure  Procedures    Patty Iqbal PA-C

## 2024-10-24 NOTE — H&P
History Of Present Illness  Maximo Pablo is a 59 y.o. male presenting with past medical history of epilepsy intellectual disability admitted related to.  Severe constipation patient cannot give any history     Past Medical History  He has a past medical history of Epilepsy, unspecified, not intractable, without status epilepticus (Multi), Hyperlipidemia, unspecified, Personal history of other diseases of the circulatory system, Personal history of other diseases of the nervous system and sense organs, Personal history of other endocrine, nutritional and metabolic disease, and Personal history of other specified conditions.    Surgical History  He has a past surgical history that includes Other surgical history (11/26/2019).     Social History  He reports that he has never smoked. He has never used smokeless tobacco. Alcohol use questions deferred to the physician. Drug use questions deferred to the physician.    Family History  No family history on file.     Allergies  Patient has no known allergies.    Review of Systems   Patient is nonverbal     Physical Exam  HENT:      Right Ear: External ear normal.      Left Ear: External ear normal.      Mouth/Throat:      Mouth: Mucous membranes are moist.   Cardiovascular:      Rate and Rhythm: Normal rate and regular rhythm.      Heart sounds: No murmur heard.     No friction rub. No gallop.   Pulmonary:      Effort: No accessory muscle usage or respiratory distress.      Breath sounds: No stridor. No wheezing or rhonchi.   Chest:      Chest wall: No tenderness.   Abdominal:   Distended abdomen  Musculoskeletal:         General: No deformity or signs of injury.      Cervical back: No rigidity or tenderness. Normal range of motion.      Right lower leg: No edema.      Left lower leg: No edema.   Skin:     Coloration: Skin is not jaundiced or pale.      Findings: No lesion.   Neurological:   Is nonverbal       Last Recorded Vitals  Blood pressure (!) 176/113, pulse 79,  temperature 36 °C (96.8 °F), temperature source Temporal, resp. rate 16, weight 79.8 kg (176 lb), SpO2 98%.    Labs    Admission on 10/23/2024   Component Date Value Ref Range Status    WBC 10/23/2024 3.8 (L)  4.4 - 11.3 x10*3/uL Final    nRBC 10/23/2024 0.0  0.0 - 0.0 /100 WBCs Final    RBC 10/23/2024 5.42  4.50 - 5.90 x10*6/uL Final    Hemoglobin 10/23/2024 14.3  13.5 - 17.5 g/dL Final    Hematocrit 10/23/2024 43.6  41.0 - 52.0 % Final    MCV 10/23/2024 80  80 - 100 fL Final    MCH 10/23/2024 26.4  26.0 - 34.0 pg Final    MCHC 10/23/2024 32.8  32.0 - 36.0 g/dL Final    RDW 10/23/2024 14.9 (H)  11.5 - 14.5 % Final    Platelets 10/23/2024 159  150 - 450 x10*3/uL Final    Neutrophils % 10/23/2024 44.0  40.0 - 80.0 % Final    Immature Granulocytes %, Automated 10/23/2024 0.0  0.0 - 0.9 % Final    Immature Granulocyte Count (IG) includes promyelocytes, myelocytes and metamyelocytes but does not include bands. Percent differential counts (%) should be interpreted in the context of the absolute cell counts (cells/UL).    Lymphocytes % 10/23/2024 45.3  13.0 - 44.0 % Final    Monocytes % 10/23/2024 4.8  2.0 - 10.0 % Final    Eosinophils % 10/23/2024 5.6  0.0 - 6.0 % Final    Basophils % 10/23/2024 0.3  0.0 - 2.0 % Final    Neutrophils Absolute 10/23/2024 1.65  1.20 - 7.70 x10*3/uL Final    Percent differential counts (%) should be interpreted in the context of the absolute cell counts (cells/uL).    Immature Granulocytes Absolute, Au* 10/23/2024 0.00  0.00 - 0.70 x10*3/uL Final    Lymphocytes Absolute 10/23/2024 1.70  1.20 - 4.80 x10*3/uL Final    Monocytes Absolute 10/23/2024 0.18  0.10 - 1.00 x10*3/uL Final    Eosinophils Absolute 10/23/2024 0.21  0.00 - 0.70 x10*3/uL Final    Basophils Absolute 10/23/2024 0.01  0.00 - 0.10 x10*3/uL Final    Glucose 10/23/2024 96  74 - 99 mg/dL Final    Sodium 10/23/2024 130 (L)  136 - 145 mmol/L Final    Potassium 10/23/2024 4.6  3.5 - 5.3 mmol/L Final    Chloride 10/23/2024 93 (L)  98  - 107 mmol/L Final    Bicarbonate 10/23/2024 30  21 - 32 mmol/L Final    Anion Gap 10/23/2024 12  10 - 20 mmol/L Final    Urea Nitrogen 10/23/2024 6  6 - 23 mg/dL Final    Creatinine 10/23/2024 0.71  0.50 - 1.30 mg/dL Final    eGFR 10/23/2024 >90  >60 mL/min/1.73m*2 Final    Calculations of estimated GFR are performed using the 2021 CKD-EPI Study Refit equation without the race variable for the IDMS-Traceable creatinine methods.  https://jasn.asnjournals.org/content/early/2021/09/22/ASN.5090264134    Calcium 10/23/2024 10.5 (H)  8.6 - 10.3 mg/dL Final    Albumin 10/23/2024 4.3  3.4 - 5.0 g/dL Final    Alkaline Phosphatase 10/23/2024 231 (H)  33 - 120 U/L Final    Total Protein 10/23/2024 8.5 (H)  6.4 - 8.2 g/dL Final    AST 10/23/2024 42 (H)  9 - 39 U/L Final    Bilirubin, Total 10/23/2024 0.2  0.0 - 1.2 mg/dL Final    ALT 10/23/2024 47  10 - 52 U/L Final    Patients treated with Sulfasalazine may generate falsely decreased results for ALT.    Lipase 10/23/2024 33  9 - 82 U/L Final    Extra Tube 10/23/2024 Hold for add-ons.   Final    Auto resulted.    Extra Tube 10/23/2024 Hold for add-ons.   Final    Auto resulted.         Imaging     CT abdomen pelvis wo IV contrast  Narrative: STUDY:  CT Abdomen and Pelvis without IV Contrast; 10/23/2024 at 11:36 PM  INDICATION:  Constipation.  History of fecal impaction.  COMPARISON:  CTA chest and CT abdomen and pelvis 8/9/2023, CT abdomen and pelvis  8/9/2023.  ACCESSION NUMBER(S):  [id number]  ORDERING CLINICIAN:  ISAMAR ESCOBAR  TECHNIQUE:  CT of the abdomen and pelvis was performed.  Contiguous axial images  were obtained at 3 mm slice thickness through the abdomen and pelvis.   Coronal and sagittal reconstructions at 3 mm slice thickness were  performed. No intravenous contrast was administered.    Automated mA/kV exposure control was utilized and patient examination  was performed in strict accordance with principles of ALARA.  FINDINGS:  Please note that the  evaluation of vessels, lymph nodes and organs is  limited without intravenous contrast.      LOWER CHEST:  No cardiomegaly.  No pericardial effusion.  Lung bases are clear.     ABDOMEN and PELVIS:  The liver, gallbladder and biliary tree show no concerning finding.  The pancreas and spleen show no concerning finding.  The adrenal glands show no concerning finding.  The kidneys are normally located.  No stones or hydronephrosis.  No  evidence of mass.  No ureteral stones or hydroureter.  The urinary bladder has a normal CT appearance.  The rectum and distal colon are markedly distended with stool.  The  rectum is mildly thickened.  Large amount of formed stool present  throughout the remainder of the colon.  Normal mesentery.  No lymphadenopathy.  No free air or fluid.  The pelvic organs show no concerning CT finding.  The imaged skeleton shows no acute pathology.  Small fat-containing umbilical hernia defect.  Impression: Severe constipation with fecal impaction and possible stercoral  colitis.  Signed by Dylon Barnett MD       Patient Active Problem List   Diagnosis    Other fatigue    Intestinal obstruction, unspecified cause, unspecified whether partial or complete (Multi)    Acute hypoxic respiratory failure (Multi)    Aspiration pneumonia of both lungs due to gastric secretions (Multi)    Hyponatremia    Transaminitis    Leukocytosis    Sepsis with acute hypoxic respiratory failure (Multi)    Constipation    Acute coronary syndrome (Multi)    Allergic contact dermatitis due to other agents    Altered mental status    Cauliflower right ear    Cerebral palsy    Disease due to severe acute respiratory syndrome coronavirus 2 (SARS-CoV-2)    Drooling    Dyslipidemia    Dysphagia    Elevation of levels of liver transaminase levels    Essential hypertension    Fever    Epilepsy    History of brain disorder    History of hypertension    Hypernatremia    Neoplasm of uncertain behavior of skin    Neurogenic bladder     Nausea and vomiting    Atopic dermatitis, unspecified    Profound intellectual disability    Rash and other nonspecific skin eruption    Seizure (Multi)    Symptom of leg swelling    Vitamin D deficiency    Stercoral colitis    Fecal impaction (Multi)         Assessment/Plan   Assessment & Plan  Transaminitis    Constipation    Stercoral colitis    Fecal impaction (Multi)    Hyponatremia      Sterile coral colitis patient will be given mag citrate and Fleet enema patient has severe constipation continue with bowel regimen  Hyponatremia monitor electrolyte and monitor liver function testing       I spent 35 minutes in the professional and overall care of this patient.      KARYNA Husain MD

## 2024-10-25 ENCOUNTER — APPOINTMENT (OUTPATIENT)
Dept: RADIOLOGY | Facility: HOSPITAL | Age: 59
End: 2024-10-25
Payer: MEDICAID

## 2024-10-25 VITALS
DIASTOLIC BLOOD PRESSURE: 90 MMHG | TEMPERATURE: 96.1 F | BODY MASS INDEX: 25.99 KG/M2 | SYSTOLIC BLOOD PRESSURE: 155 MMHG | WEIGHT: 176 LBS | HEART RATE: 66 BPM | RESPIRATION RATE: 16 BRPM | OXYGEN SATURATION: 98 %

## 2024-10-25 LAB
ALBUMIN SERPL BCP-MCNC: 4 G/DL (ref 3.4–5)
ALP SERPL-CCNC: 222 U/L (ref 33–120)
ALT SERPL W P-5'-P-CCNC: 37 U/L (ref 10–52)
ANION GAP SERPL CALC-SCNC: 9 MMOL/L (ref 10–20)
AST SERPL W P-5'-P-CCNC: 33 U/L (ref 9–39)
BILIRUB SERPL-MCNC: 0.3 MG/DL (ref 0–1.2)
BUN SERPL-MCNC: 6 MG/DL (ref 6–23)
CALCIUM SERPL-MCNC: 10.3 MG/DL (ref 8.6–10.3)
CHLORIDE SERPL-SCNC: 94 MMOL/L (ref 98–107)
CO2 SERPL-SCNC: 32 MMOL/L (ref 21–32)
CREAT SERPL-MCNC: 0.67 MG/DL (ref 0.5–1.3)
EGFRCR SERPLBLD CKD-EPI 2021: >90 ML/MIN/1.73M*2
ERYTHROCYTE [DISTWIDTH] IN BLOOD BY AUTOMATED COUNT: 14.9 % (ref 11.5–14.5)
GLUCOSE SERPL-MCNC: 104 MG/DL (ref 74–99)
HCT VFR BLD AUTO: 42 % (ref 41–52)
HGB BLD-MCNC: 14 G/DL (ref 13.5–17.5)
MAGNESIUM SERPL-MCNC: 2.6 MG/DL (ref 1.6–2.4)
MCH RBC QN AUTO: 26.3 PG (ref 26–34)
MCHC RBC AUTO-ENTMCNC: 33.3 G/DL (ref 32–36)
MCV RBC AUTO: 79 FL (ref 80–100)
NRBC BLD-RTO: 0 /100 WBCS (ref 0–0)
OSMOLALITY SERPL: 281 MOSM/KG (ref 280–300)
OSMOLALITY UR: 478 MOSM/KG (ref 200–1200)
PLATELET # BLD AUTO: 184 X10*3/UL (ref 150–450)
POTASSIUM SERPL-SCNC: 3.9 MMOL/L (ref 3.5–5.3)
PROT SERPL-MCNC: 8.1 G/DL (ref 6.4–8.2)
RBC # BLD AUTO: 5.32 X10*6/UL (ref 4.5–5.9)
SODIUM SERPL-SCNC: 131 MMOL/L (ref 136–145)
WBC # BLD AUTO: 3.4 X10*3/UL (ref 4.4–11.3)

## 2024-10-25 PROCEDURE — 2500000001 HC RX 250 WO HCPCS SELF ADMINISTERED DRUGS (ALT 637 FOR MEDICARE OP): Performed by: NURSE PRACTITIONER

## 2024-10-25 PROCEDURE — 96372 THER/PROPH/DIAG INJ SC/IM: CPT

## 2024-10-25 PROCEDURE — 2500000001 HC RX 250 WO HCPCS SELF ADMINISTERED DRUGS (ALT 637 FOR MEDICARE OP)

## 2024-10-25 PROCEDURE — 2500000002 HC RX 250 W HCPCS SELF ADMINISTERED DRUGS (ALT 637 FOR MEDICARE OP, ALT 636 FOR OP/ED): Performed by: NURSE PRACTITIONER

## 2024-10-25 PROCEDURE — 80053 COMPREHEN METABOLIC PANEL: CPT | Performed by: NURSE PRACTITIONER

## 2024-10-25 PROCEDURE — 2500000004 HC RX 250 GENERAL PHARMACY W/ HCPCS (ALT 636 FOR OP/ED)

## 2024-10-25 PROCEDURE — 36415 COLL VENOUS BLD VENIPUNCTURE: CPT | Performed by: NURSE PRACTITIONER

## 2024-10-25 PROCEDURE — G0378 HOSPITAL OBSERVATION PER HR: HCPCS

## 2024-10-25 PROCEDURE — 74018 RADEX ABDOMEN 1 VIEW: CPT | Performed by: RADIOLOGY

## 2024-10-25 PROCEDURE — 83735 ASSAY OF MAGNESIUM: CPT

## 2024-10-25 PROCEDURE — 74018 RADEX ABDOMEN 1 VIEW: CPT

## 2024-10-25 PROCEDURE — 85027 COMPLETE CBC AUTOMATED: CPT

## 2024-10-25 PROCEDURE — 36415 COLL VENOUS BLD VENIPUNCTURE: CPT

## 2024-10-25 ASSESSMENT — COGNITIVE AND FUNCTIONAL STATUS - GENERAL
HELP NEEDED FOR BATHING: TOTAL
TOILETING: TOTAL
PERSONAL GROOMING: TOTAL
EATING MEALS: TOTAL
DRESSING REGULAR LOWER BODY CLOTHING: TOTAL
DAILY ACTIVITIY SCORE: 6
DRESSING REGULAR UPPER BODY CLOTHING: TOTAL

## 2024-10-25 ASSESSMENT — PAIN SCALES - GENERAL: PAINLEVEL_OUTOF10: 0 - NO PAIN

## 2024-10-25 ASSESSMENT — PAIN SCALES - WONG BAKER
WONGBAKER_NUMERICALRESPONSE: NO HURT
WONGBAKER_NUMERICALRESPONSE: NO HURT

## 2024-10-25 NOTE — DISCHARGE SUMMARY
Discharge Diagnosis  Stercoral colitis    Issues Requiring Follow-Up  Discharged to group home    Discharge Meds     Medication List      CONTINUE taking these medications     amLODIPine 10 mg tablet; Commonly known as: Norvasc   aspirin 81 mg chewable tablet   chlorhexidine 0.12 % solution; Commonly known as: Peridex   cholecalciferol 25 MCG (1000 UT) tablet; Commonly known as: Vitamin D-3   cloNIDine 0.2 mg tablet; Commonly known as: Catapres   Dupixent Pen 300 mg/2 mL pen injector; Generic drug: dupilumab; INJECT   300MG (1 PEN) UNDER THE SKIN EVERY OTHER WEEK.   labetalol 100 mg tablet; Commonly known as: Normodyne   levETIRAcetam 500 mg tablet; Commonly known as: Keppra   lisinopril 20 mg tablet   multivitamin with minerals tablet   omeprazole 20 mg DR capsule; Commonly known as: PriLOSEC; Take 2   capsules (40 mg) by mouth once daily. Do not crush or chew.   * OXcarbazepine 150 mg tablet; Commonly known as: Trileptal   * OXcarbazepine 300 mg tablet; Commonly known as: Trileptal   polyethylene glycol 17 gram/dose powder; Commonly known as: Glycolax,   Miralax   sennosides-docusate sodium 8.6-50 mg tablet; Commonly known as:   Katty-Colace  * This list has 2 medication(s) that are the same as other medications   prescribed for you. Read the directions carefully, and ask your doctor or   other care provider to review them with you.     STOP taking these medications     potassium chloride ER 10 mEq ER capsule; Commonly known as: Micro-K     ASK your doctor about these medications     hydroCHLOROthiazide 12.5 mg tablet; Commonly known as: Microzide       Test Results Pending At Discharge  Pending Labs       No current pending labs.            Hospital Course   I am thinking patient was admitted to the hospital related to severe constipation and stool impaction patient lives in a group home he has intellectual disability with being nonverbal patient seen in emergency room CAT scan consistent with sternal colitis  patient treated with mag citrate and enema with improvement patient will be discharged back on bowel regimen    Pertinent Physical Exam At Time of Discharge  Physical Exam  Patient is contracted nonverbal  Outpatient Follow-Up  Future Appointments   Date Time Provider Department Center   2/28/2025 11:10 AM Keturah Nathan DO [id number] Logan Husain MD

## 2024-10-25 NOTE — CARE PLAN
The patient's goals for the shift include  Patient will have normal bowel movements during the shift    The clinical goals for the shift include patient will remain safe during the shift    Over the shift, the patient did not make progress toward the following goals. Barriers to progression include patient inc and having loose stools. Recommendations to address these barriers include continue to monitor  Problem: Pain - Adult  Goal: Verbalizes/displays adequate comfort level or baseline comfort level  Outcome: Progressing     Problem: Safety - Adult  Goal: Free from fall injury  Outcome: Progressing     Problem: Skin  Goal: Decreased wound size/increased tissue granulation at next dressing change  Outcome: Progressing   .

## 2024-10-25 NOTE — NURSING NOTE
PT.DISCHARGED BACK TO [facility]--GROUP HOME STAFF FOR TRANSPORT. SALINE LOCK REMOVED--DISCHARGE PAPERS GIVEN TO THEIR STAFF. PRIOR TO DISCHARGE---PT.HAD XL LIQUID/SEMI FORMED BM--PT.CLEANED UP PRIOR TO DEPARTURE. NO OTHER DISTRESS NOTED.

## 2024-10-28 NOTE — PROGRESS NOTES
Emergency Medicine Attending Attestation:     ED Course as of 10/28/24 1310   Thu Oct 24, 2024   0052 Severe constipation with fecal impaction and possible stercoral  Colitis on CT     [GV]      ED Course User Index  [GV] Danuta Mari MD         Diagnoses as of 10/28/24 1310   Fecal impaction (Multi)   Stercoral colitis       The patient was seen by the resident/fellow.  I have personally performed a substantive portion of the encounter.  I have seen and examined the patient; agree with the workup, evaluation, MDM, management and diagnosis.  The care plan has been discussed with the resident/fellow; I have reviewed the resident/fellow’s note and agree with the documented findings with the exception/addition of the followin-year-old male with past medical history of epilepsy, HLD, developmental delay C/B severe constipation, extremity contractions presenting to the emergency department with decreased p.o. intake.  Presents with his health aide who states that he said decreased p.o. intake, is otherwise been acting his normal self, has not had a bowel movement in the past several days.  Has a history of stool holding behavior.  On physical exam, patient appears nontoxic in appearance, alert, moans and makes facial expressions to help communicate with his nursing aide at his stated baseline.  Has bilateral upper extremity contractures present again at patient's baseline.  Abdomen is soft, mildly distended, no large masses, patient does not appear to have acute pain or tenderness and does not wince when palpated in all 4 quadrants.  Has no rebound or guarding.  Labs showing no evidence of new severe metabolic derangement or clinically significant electrode abnormalities or VIVIENNE to suggest severe dehydration.  Will be sent for a CT the abdomen pelvis to evaluate for stercoral colitis, bowel obstruction, ileus, or other severe constipation.  Of note, patient did have a small bowel movement in his bed in the  emergency department.  Has been receiving a bowel regimen at home.  Patient handed off in stable condition pending results of CT imaging.    I independently interpreted patient's EKG and agree with the above mentioned interpretation.      Alexander Johnson MD

## 2024-10-29 ENCOUNTER — SPECIALTY PHARMACY (OUTPATIENT)
Dept: PHARMACY | Facility: CLINIC | Age: 59
End: 2024-10-29

## 2024-10-29 PROCEDURE — RXMED WILLOW AMBULATORY MEDICATION CHARGE

## 2024-10-30 ENCOUNTER — PHARMACY VISIT (OUTPATIENT)
Dept: PHARMACY | Facility: CLINIC | Age: 59
End: 2024-10-30
Payer: MEDICAID

## 2024-11-04 ENCOUNTER — LAB REQUISITION (OUTPATIENT)
Dept: LAB | Facility: HOSPITAL | Age: 59
End: 2024-11-04
Payer: MEDICAID

## 2024-11-04 DIAGNOSIS — E87.1 HYPO-OSMOLALITY AND HYPONATREMIA: ICD-10-CM

## 2024-11-04 LAB
ANION GAP SERPL CALC-SCNC: 9 MMOL/L (ref 10–20)
BUN SERPL-MCNC: 9 MG/DL (ref 6–23)
CALCIUM SERPL-MCNC: 11 MG/DL (ref 8.6–10.3)
CHLORIDE SERPL-SCNC: 97 MMOL/L (ref 98–107)
CO2 SERPL-SCNC: 34 MMOL/L (ref 21–32)
CREAT SERPL-MCNC: 1.19 MG/DL (ref 0.5–1.3)
EGFRCR SERPLBLD CKD-EPI 2021: 71 ML/MIN/1.73M*2
GLUCOSE SERPL-MCNC: 80 MG/DL (ref 74–99)
POTASSIUM SERPL-SCNC: 4.8 MMOL/L (ref 3.5–5.3)
SODIUM SERPL-SCNC: 135 MMOL/L (ref 136–145)

## 2024-11-04 PROCEDURE — 80048 BASIC METABOLIC PNL TOTAL CA: CPT | Mod: OUT | Performed by: INTERNAL MEDICINE

## 2024-11-06 ENCOUNTER — APPOINTMENT (OUTPATIENT)
Dept: RADIOLOGY | Facility: HOSPITAL | Age: 59
End: 2024-11-06
Payer: MEDICAID

## 2024-11-06 ENCOUNTER — HOSPITAL ENCOUNTER (INPATIENT)
Facility: HOSPITAL | Age: 59
End: 2024-11-06
Attending: STUDENT IN AN ORGANIZED HEALTH CARE EDUCATION/TRAINING PROGRAM | Admitting: INTERNAL MEDICINE
Payer: MEDICAID

## 2024-11-06 ENCOUNTER — APPOINTMENT (OUTPATIENT)
Dept: CARDIOLOGY | Facility: HOSPITAL | Age: 59
End: 2024-11-06
Payer: MEDICAID

## 2024-11-06 DIAGNOSIS — T68.XXXA HYPOTHERMIA, INITIAL ENCOUNTER: Primary | ICD-10-CM

## 2024-11-06 LAB
ALBUMIN SERPL BCP-MCNC: 3.6 G/DL (ref 3.4–5)
ALBUMIN SERPL BCP-MCNC: 4 G/DL (ref 3.4–5)
ALP SERPL-CCNC: 219 U/L (ref 33–120)
ALP SERPL-CCNC: 258 U/L (ref 33–120)
ALT SERPL W P-5'-P-CCNC: 32 U/L (ref 10–52)
ALT SERPL W P-5'-P-CCNC: 39 U/L (ref 10–52)
AMYLASE SERPL-CCNC: 60 U/L (ref 29–103)
ANION GAP SERPL CALC-SCNC: 11 MMOL/L (ref 10–20)
ANION GAP SERPL CALC-SCNC: 8 MMOL/L (ref 10–20)
AST SERPL W P-5'-P-CCNC: 41 U/L (ref 9–39)
AST SERPL W P-5'-P-CCNC: 56 U/L (ref 9–39)
BASOPHILS # BLD AUTO: 0 X10*3/UL (ref 0–0.1)
BASOPHILS # BLD AUTO: 0.01 X10*3/UL (ref 0–0.1)
BASOPHILS NFR BLD AUTO: 0 %
BASOPHILS NFR BLD AUTO: 0.3 %
BILIRUB SERPL-MCNC: 0.2 MG/DL (ref 0–1.2)
BILIRUB SERPL-MCNC: 0.3 MG/DL (ref 0–1.2)
BUN SERPL-MCNC: 10 MG/DL (ref 6–23)
BUN SERPL-MCNC: 8 MG/DL (ref 6–23)
CALCIUM SERPL-MCNC: 11.1 MG/DL (ref 8.6–10.3)
CALCIUM SERPL-MCNC: 9.9 MG/DL (ref 8.6–10.3)
CARDIAC TROPONIN I PNL SERPL HS: 16 NG/L (ref 0–20)
CARDIAC TROPONIN I PNL SERPL HS: 17 NG/L (ref 0–20)
CHLORIDE SERPL-SCNC: 97 MMOL/L (ref 98–107)
CHLORIDE SERPL-SCNC: 97 MMOL/L (ref 98–107)
CO2 SERPL-SCNC: 29 MMOL/L (ref 21–32)
CO2 SERPL-SCNC: 36 MMOL/L (ref 21–32)
CREAT SERPL-MCNC: 1.1 MG/DL (ref 0.5–1.3)
CREAT SERPL-MCNC: 1.19 MG/DL (ref 0.5–1.3)
EGFRCR SERPLBLD CKD-EPI 2021: 71 ML/MIN/1.73M*2
EGFRCR SERPLBLD CKD-EPI 2021: 78 ML/MIN/1.73M*2
EOSINOPHIL # BLD AUTO: 0.04 X10*3/UL (ref 0–0.7)
EOSINOPHIL # BLD AUTO: 0.06 X10*3/UL (ref 0–0.7)
EOSINOPHIL NFR BLD AUTO: 1.1 %
EOSINOPHIL NFR BLD AUTO: 2.2 %
ERYTHROCYTE [DISTWIDTH] IN BLOOD BY AUTOMATED COUNT: 15.4 % (ref 11.5–14.5)
ERYTHROCYTE [DISTWIDTH] IN BLOOD BY AUTOMATED COUNT: 15.6 % (ref 11.5–14.5)
GIANT PLATELETS BLD QL SMEAR: NORMAL
GLUCOSE BLD MANUAL STRIP-MCNC: 164 MG/DL (ref 74–99)
GLUCOSE BLD MANUAL STRIP-MCNC: 76 MG/DL (ref 74–99)
GLUCOSE BLD MANUAL STRIP-MCNC: 84 MG/DL (ref 74–99)
GLUCOSE SERPL-MCNC: 160 MG/DL (ref 74–99)
GLUCOSE SERPL-MCNC: 79 MG/DL (ref 74–99)
HCT VFR BLD AUTO: 38.1 % (ref 41–52)
HCT VFR BLD AUTO: 41.1 % (ref 41–52)
HGB BLD-MCNC: 12.6 G/DL (ref 13.5–17.5)
HGB BLD-MCNC: 13.6 G/DL (ref 13.5–17.5)
HOLD SPECIMEN: NORMAL
HOLD SPECIMEN: NORMAL
IMM GRANULOCYTES # BLD AUTO: 0.01 X10*3/UL (ref 0–0.7)
IMM GRANULOCYTES # BLD AUTO: 0.01 X10*3/UL (ref 0–0.7)
IMM GRANULOCYTES NFR BLD AUTO: 0.3 % (ref 0–0.9)
IMM GRANULOCYTES NFR BLD AUTO: 0.4 % (ref 0–0.9)
LACTATE SERPL-SCNC: 1 MMOL/L (ref 0.4–2)
LIPASE SERPL-CCNC: 114 U/L (ref 9–82)
LYMPHOCYTES # BLD AUTO: 0.64 X10*3/UL (ref 1.2–4.8)
LYMPHOCYTES # BLD AUTO: 0.88 X10*3/UL (ref 1.2–4.8)
LYMPHOCYTES NFR BLD AUTO: 17.6 %
LYMPHOCYTES NFR BLD AUTO: 31.8 %
MCH RBC QN AUTO: 26.3 PG (ref 26–34)
MCH RBC QN AUTO: 26.4 PG (ref 26–34)
MCHC RBC AUTO-ENTMCNC: 33.1 G/DL (ref 32–36)
MCHC RBC AUTO-ENTMCNC: 33.1 G/DL (ref 32–36)
MCV RBC AUTO: 80 FL (ref 80–100)
MCV RBC AUTO: 80 FL (ref 80–100)
MONOCYTES # BLD AUTO: 0.15 X10*3/UL (ref 0.1–1)
MONOCYTES # BLD AUTO: 0.19 X10*3/UL (ref 0.1–1)
MONOCYTES NFR BLD AUTO: 5.2 %
MONOCYTES NFR BLD AUTO: 5.4 %
NEUTROPHILS # BLD AUTO: 1.67 X10*3/UL (ref 1.2–7.7)
NEUTROPHILS # BLD AUTO: 2.75 X10*3/UL (ref 1.2–7.7)
NEUTROPHILS NFR BLD AUTO: 60.2 %
NEUTROPHILS NFR BLD AUTO: 75.5 %
NRBC BLD-RTO: 0 /100 WBCS (ref 0–0)
NRBC BLD-RTO: 0.5 /100 WBCS (ref 0–0)
PLATELET # BLD AUTO: 77 X10*3/UL (ref 150–450)
PLATELET # BLD AUTO: 85 X10*3/UL (ref 150–450)
POTASSIUM SERPL-SCNC: 4.3 MMOL/L (ref 3.5–5.3)
POTASSIUM SERPL-SCNC: 4.4 MMOL/L (ref 3.5–5.3)
PROT SERPL-MCNC: 7.1 G/DL (ref 6.4–8.2)
PROT SERPL-MCNC: 8.2 G/DL (ref 6.4–8.2)
RBC # BLD AUTO: 4.79 X10*6/UL (ref 4.5–5.9)
RBC # BLD AUTO: 5.15 X10*6/UL (ref 4.5–5.9)
RBC MORPH BLD: NORMAL
SODIUM SERPL-SCNC: 133 MMOL/L (ref 136–145)
SODIUM SERPL-SCNC: 137 MMOL/L (ref 136–145)
TSH SERPL-ACNC: 3.31 MIU/L (ref 0.44–3.98)
WBC # BLD AUTO: 2.8 X10*3/UL (ref 4.4–11.3)
WBC # BLD AUTO: 3.6 X10*3/UL (ref 4.4–11.3)

## 2024-11-06 PROCEDURE — 96366 THER/PROPH/DIAG IV INF ADDON: CPT

## 2024-11-06 PROCEDURE — 96375 TX/PRO/DX INJ NEW DRUG ADDON: CPT

## 2024-11-06 PROCEDURE — 71045 X-RAY EXAM CHEST 1 VIEW: CPT | Performed by: RADIOLOGY

## 2024-11-06 PROCEDURE — 74177 CT ABD & PELVIS W/CONTRAST: CPT

## 2024-11-06 PROCEDURE — 85025 COMPLETE CBC W/AUTO DIFF WBC: CPT | Performed by: STUDENT IN AN ORGANIZED HEALTH CARE EDUCATION/TRAINING PROGRAM

## 2024-11-06 PROCEDURE — 80053 COMPREHEN METABOLIC PANEL: CPT | Performed by: STUDENT IN AN ORGANIZED HEALTH CARE EDUCATION/TRAINING PROGRAM

## 2024-11-06 PROCEDURE — 2500000004 HC RX 250 GENERAL PHARMACY W/ HCPCS (ALT 636 FOR OP/ED)

## 2024-11-06 PROCEDURE — 2500000005 HC RX 250 GENERAL PHARMACY W/O HCPCS: Performed by: STUDENT IN AN ORGANIZED HEALTH CARE EDUCATION/TRAINING PROGRAM

## 2024-11-06 PROCEDURE — 84484 ASSAY OF TROPONIN QUANT: CPT | Performed by: STUDENT IN AN ORGANIZED HEALTH CARE EDUCATION/TRAINING PROGRAM

## 2024-11-06 PROCEDURE — 93010 ELECTROCARDIOGRAM REPORT: CPT | Performed by: STUDENT IN AN ORGANIZED HEALTH CARE EDUCATION/TRAINING PROGRAM

## 2024-11-06 PROCEDURE — 99291 CRITICAL CARE FIRST HOUR: CPT | Performed by: STUDENT IN AN ORGANIZED HEALTH CARE EDUCATION/TRAINING PROGRAM

## 2024-11-06 PROCEDURE — 83690 ASSAY OF LIPASE: CPT | Performed by: STUDENT IN AN ORGANIZED HEALTH CARE EDUCATION/TRAINING PROGRAM

## 2024-11-06 PROCEDURE — 80177 DRUG SCRN QUAN LEVETIRACETAM: CPT | Mod: STJLAB

## 2024-11-06 PROCEDURE — 85025 COMPLETE CBC W/AUTO DIFF WBC: CPT

## 2024-11-06 PROCEDURE — 82530 CORTISOL FREE: CPT | Performed by: STUDENT IN AN ORGANIZED HEALTH CARE EDUCATION/TRAINING PROGRAM

## 2024-11-06 PROCEDURE — 84443 ASSAY THYROID STIM HORMONE: CPT | Performed by: STUDENT IN AN ORGANIZED HEALTH CARE EDUCATION/TRAINING PROGRAM

## 2024-11-06 PROCEDURE — 82947 ASSAY GLUCOSE BLOOD QUANT: CPT

## 2024-11-06 PROCEDURE — 96365 THER/PROPH/DIAG IV INF INIT: CPT

## 2024-11-06 PROCEDURE — 74018 RADEX ABDOMEN 1 VIEW: CPT

## 2024-11-06 PROCEDURE — 2500000002 HC RX 250 W HCPCS SELF ADMINISTERED DRUGS (ALT 637 FOR MEDICARE OP, ALT 636 FOR OP/ED)

## 2024-11-06 PROCEDURE — 2020000001 HC ICU ROOM DAILY

## 2024-11-06 PROCEDURE — 96361 HYDRATE IV INFUSION ADD-ON: CPT

## 2024-11-06 PROCEDURE — 84145 PROCALCITONIN (PCT): CPT | Mod: STJLAB

## 2024-11-06 PROCEDURE — 36415 COLL VENOUS BLD VENIPUNCTURE: CPT | Performed by: STUDENT IN AN ORGANIZED HEALTH CARE EDUCATION/TRAINING PROGRAM

## 2024-11-06 PROCEDURE — 87040 BLOOD CULTURE FOR BACTERIA: CPT | Mod: STJLAB | Performed by: STUDENT IN AN ORGANIZED HEALTH CARE EDUCATION/TRAINING PROGRAM

## 2024-11-06 PROCEDURE — 82150 ASSAY OF AMYLASE: CPT

## 2024-11-06 PROCEDURE — 80183 DRUG SCRN QUANT OXCARBAZEPIN: CPT

## 2024-11-06 PROCEDURE — 2500000001 HC RX 250 WO HCPCS SELF ADMINISTERED DRUGS (ALT 637 FOR MEDICARE OP)

## 2024-11-06 PROCEDURE — 2550000001 HC RX 255 CONTRASTS: Performed by: STUDENT IN AN ORGANIZED HEALTH CARE EDUCATION/TRAINING PROGRAM

## 2024-11-06 PROCEDURE — 74177 CT ABD & PELVIS W/CONTRAST: CPT | Performed by: RADIOLOGY

## 2024-11-06 PROCEDURE — 71045 X-RAY EXAM CHEST 1 VIEW: CPT

## 2024-11-06 PROCEDURE — 83605 ASSAY OF LACTIC ACID: CPT | Performed by: STUDENT IN AN ORGANIZED HEALTH CARE EDUCATION/TRAINING PROGRAM

## 2024-11-06 PROCEDURE — 93005 ELECTROCARDIOGRAM TRACING: CPT

## 2024-11-06 PROCEDURE — 80053 COMPREHEN METABOLIC PANEL: CPT

## 2024-11-06 PROCEDURE — 74018 RADEX ABDOMEN 1 VIEW: CPT | Performed by: RADIOLOGY

## 2024-11-06 PROCEDURE — 2500000004 HC RX 250 GENERAL PHARMACY W/ HCPCS (ALT 636 FOR OP/ED): Performed by: STUDENT IN AN ORGANIZED HEALTH CARE EDUCATION/TRAINING PROGRAM

## 2024-11-06 RX ORDER — ESOMEPRAZOLE MAGNESIUM 40 MG/1
40 GRANULE, DELAYED RELEASE ORAL
Status: DISCONTINUED | OUTPATIENT
Start: 2024-11-07 | End: 2024-11-06

## 2024-11-06 RX ORDER — LEVETIRACETAM 500 MG/1
500 TABLET ORAL 2 TIMES DAILY
Status: DISCONTINUED | OUTPATIENT
Start: 2024-11-06 | End: 2024-11-06

## 2024-11-06 RX ORDER — NOREPINEPHRINE BITARTRATE/D5W 8 MG/250ML
0-.2 PLASTIC BAG, INJECTION (ML) INTRAVENOUS CONTINUOUS
Status: DISCONTINUED | OUTPATIENT
Start: 2024-11-06 | End: 2024-11-07

## 2024-11-06 RX ORDER — POTASSIUM CHLORIDE 20 MEQ/1
40 TABLET, EXTENDED RELEASE ORAL EVERY 6 HOURS PRN
Status: DISCONTINUED | OUTPATIENT
Start: 2024-11-06 | End: 2024-11-06

## 2024-11-06 RX ORDER — LISINOPRIL 20 MG/1
20 TABLET ORAL 2 TIMES DAILY
Status: ACTIVE | OUTPATIENT
Start: 2024-11-06

## 2024-11-06 RX ORDER — ACETAMINOPHEN 325 MG/1
650 TABLET ORAL EVERY 4 HOURS PRN
Status: DISPENSED | OUTPATIENT
Start: 2024-11-06

## 2024-11-06 RX ORDER — MAGNESIUM SULFATE HEPTAHYDRATE 40 MG/ML
4 INJECTION, SOLUTION INTRAVENOUS EVERY 6 HOURS PRN
Status: DISCONTINUED | OUTPATIENT
Start: 2024-11-06 | End: 2024-11-08

## 2024-11-06 RX ORDER — POLYETHYLENE GLYCOL 3350 17 G/17G
17 POWDER, FOR SOLUTION ORAL 2 TIMES DAILY
Status: DISCONTINUED | OUTPATIENT
Start: 2024-11-06 | End: 2024-11-06

## 2024-11-06 RX ORDER — ACETAMINOPHEN 325 MG/1
650 TABLET ORAL EVERY 4 HOURS PRN
Status: ON HOLD | COMMUNITY

## 2024-11-06 RX ORDER — POTASSIUM CHLORIDE 20 MEQ/1
20 TABLET, EXTENDED RELEASE ORAL EVERY 6 HOURS PRN
Status: ACTIVE | OUTPATIENT
Start: 2024-11-06

## 2024-11-06 RX ORDER — POTASSIUM CHLORIDE 1.5 G/1.58G
20 POWDER, FOR SOLUTION ORAL EVERY 6 HOURS PRN
Status: DISCONTINUED | OUTPATIENT
Start: 2024-11-06 | End: 2024-11-06

## 2024-11-06 RX ORDER — OXCARBAZEPINE 150 MG/1
150 TABLET, FILM COATED ORAL 2 TIMES DAILY
Status: DISCONTINUED | OUTPATIENT
Start: 2024-11-06 | End: 2024-11-06

## 2024-11-06 RX ORDER — VANCOMYCIN HYDROCHLORIDE 1 G/20ML
INJECTION, POWDER, LYOPHILIZED, FOR SOLUTION INTRAVENOUS DAILY PRN
Status: DISCONTINUED | OUTPATIENT
Start: 2024-11-06 | End: 2024-11-07

## 2024-11-06 RX ORDER — POLYETHYLENE GLYCOL 3350 17 G/17G
17 POWDER, FOR SOLUTION ORAL 2 TIMES DAILY
Status: DISCONTINUED | OUTPATIENT
Start: 2024-11-07 | End: 2024-11-07

## 2024-11-06 RX ORDER — POTASSIUM CHLORIDE 20 MEQ/1
20 TABLET, EXTENDED RELEASE ORAL EVERY 6 HOURS PRN
Status: DISCONTINUED | OUTPATIENT
Start: 2024-11-06 | End: 2024-11-06

## 2024-11-06 RX ORDER — POTASSIUM CHLORIDE 20 MEQ/1
40 TABLET, EXTENDED RELEASE ORAL EVERY 6 HOURS PRN
Status: ACTIVE | OUTPATIENT
Start: 2024-11-06

## 2024-11-06 RX ORDER — ATROPINE SULFATE 10 MG/ML
1 SOLUTION/ DROPS OPHTHALMIC
Status: ON HOLD | COMMUNITY

## 2024-11-06 RX ORDER — CHOLECALCIFEROL (VITAMIN D3) 25 MCG
1000 TABLET ORAL DAILY
Status: ON HOLD | COMMUNITY

## 2024-11-06 RX ORDER — INSULIN LISPRO 100 [IU]/ML
0-5 INJECTION, SOLUTION INTRAVENOUS; SUBCUTANEOUS EVERY 4 HOURS
Status: DISCONTINUED | OUTPATIENT
Start: 2024-11-06 | End: 2024-11-10

## 2024-11-06 RX ORDER — LEVETIRACETAM 500 MG/1
500 TABLET ORAL 2 TIMES DAILY
Status: DISPENSED | OUTPATIENT
Start: 2024-11-07

## 2024-11-06 RX ORDER — AMLODIPINE BESYLATE 10 MG/1
10 TABLET ORAL DAILY
Status: ACTIVE | OUTPATIENT
Start: 2024-11-06

## 2024-11-06 RX ORDER — ENOXAPARIN SODIUM 100 MG/ML
40 INJECTION SUBCUTANEOUS EVERY 24 HOURS
Status: DISCONTINUED | OUTPATIENT
Start: 2024-11-06 | End: 2024-11-09

## 2024-11-06 RX ORDER — BISACODYL 10 MG/1
10 SUPPOSITORY RECTAL DAILY PRN
Status: ON HOLD | COMMUNITY

## 2024-11-06 RX ORDER — DEXTROSE 50 % IN WATER (D50W) INTRAVENOUS SYRINGE
25
Status: ACTIVE | OUTPATIENT
Start: 2024-11-06

## 2024-11-06 RX ORDER — ENEMA 19; 7 G/133ML; G/133ML
1 ENEMA RECTAL DAILY PRN
Status: ON HOLD | COMMUNITY

## 2024-11-06 RX ORDER — LABETALOL 100 MG/1
100 TABLET, FILM COATED ORAL 2 TIMES DAILY
Status: DISPENSED | OUTPATIENT
Start: 2024-11-06

## 2024-11-06 RX ORDER — NAPROXEN SODIUM 220 MG/1
81 TABLET, FILM COATED ORAL
Status: DISCONTINUED | OUTPATIENT
Start: 2024-11-06 | End: 2024-11-06

## 2024-11-06 RX ORDER — ESOMEPRAZOLE MAGNESIUM 40 MG/1
40 GRANULE, DELAYED RELEASE ORAL
Status: DISCONTINUED | OUTPATIENT
Start: 2024-11-07 | End: 2024-11-07

## 2024-11-06 RX ORDER — PANTOPRAZOLE SODIUM 40 MG/1
40 TABLET, DELAYED RELEASE ORAL
Status: DISCONTINUED | OUTPATIENT
Start: 2024-11-07 | End: 2024-11-06

## 2024-11-06 RX ORDER — CLONIDINE HYDROCHLORIDE 0.2 MG/1
0.2 TABLET ORAL 2 TIMES DAILY
Status: ACTIVE | OUTPATIENT
Start: 2024-11-06

## 2024-11-06 RX ORDER — DEXTROSE 50 % IN WATER (D50W) INTRAVENOUS SYRINGE
12.5
Status: DISPENSED | OUTPATIENT
Start: 2024-11-06

## 2024-11-06 RX ORDER — LANOLIN ALCOHOL/MO/W.PET/CERES
100 CREAM (GRAM) TOPICAL DAILY
Status: DISPENSED | OUTPATIENT
Start: 2024-11-07

## 2024-11-06 RX ORDER — OXCARBAZEPINE 150 MG/1
150 TABLET, FILM COATED ORAL 2 TIMES DAILY
Status: DISPENSED | OUTPATIENT
Start: 2024-11-07

## 2024-11-06 RX ORDER — OXCARBAZEPINE 300 MG/1
300 TABLET, FILM COATED ORAL 2 TIMES DAILY
Status: DISCONTINUED | OUTPATIENT
Start: 2024-11-06 | End: 2024-11-06

## 2024-11-06 RX ORDER — NAPROXEN SODIUM 220 MG/1
81 TABLET, FILM COATED ORAL DAILY
Status: DISPENSED | OUTPATIENT
Start: 2024-11-07

## 2024-11-06 RX ORDER — VANCOMYCIN HYDROCHLORIDE 1 G/200ML
1000 INJECTION, SOLUTION INTRAVENOUS ONCE
Status: DISCONTINUED | OUTPATIENT
Start: 2024-11-06 | End: 2024-11-07

## 2024-11-06 RX ORDER — CHOLECALCIFEROL (VITAMIN D3) 25 MCG
1000 TABLET ORAL DAILY
Status: DISCONTINUED | OUTPATIENT
Start: 2024-11-06 | End: 2024-11-06

## 2024-11-06 RX ORDER — IPRATROPIUM BROMIDE AND ALBUTEROL SULFATE 2.5; .5 MG/3ML; MG/3ML
3 SOLUTION RESPIRATORY (INHALATION) EVERY 4 HOURS PRN
Status: ACTIVE | OUTPATIENT
Start: 2024-11-06

## 2024-11-06 RX ORDER — BISACODYL 10 MG/1
10 SUPPOSITORY RECTAL DAILY PRN
Status: ACTIVE | OUTPATIENT
Start: 2024-11-06

## 2024-11-06 RX ORDER — CHOLECALCIFEROL (VITAMIN D3) 25 MCG
1000 TABLET ORAL DAILY
Status: DISPENSED | OUTPATIENT
Start: 2024-11-07

## 2024-11-06 RX ORDER — LANOLIN ALCOHOL/MO/W.PET/CERES
100 CREAM (GRAM) TOPICAL DAILY
Status: DISCONTINUED | OUTPATIENT
Start: 2024-11-06 | End: 2024-11-06

## 2024-11-06 RX ORDER — ACETAMINOPHEN 650 MG/1
650 SUPPOSITORY RECTAL EVERY 4 HOURS PRN
Status: DISCONTINUED | OUTPATIENT
Start: 2024-11-06 | End: 2024-11-08

## 2024-11-06 RX ORDER — POTASSIUM CHLORIDE 14.9 MG/ML
20 INJECTION INTRAVENOUS EVERY 6 HOURS PRN
Status: DISCONTINUED | OUTPATIENT
Start: 2024-11-06 | End: 2024-11-08

## 2024-11-06 RX ORDER — MAGNESIUM SULFATE HEPTAHYDRATE 40 MG/ML
2 INJECTION, SOLUTION INTRAVENOUS EVERY 6 HOURS PRN
Status: DISCONTINUED | OUTPATIENT
Start: 2024-11-06 | End: 2024-11-08

## 2024-11-06 RX ORDER — OXCARBAZEPINE 300 MG/1
300 TABLET, FILM COATED ORAL 2 TIMES DAILY
Status: DISPENSED | OUTPATIENT
Start: 2024-11-07

## 2024-11-06 RX ORDER — PANTOPRAZOLE SODIUM 40 MG/10ML
40 INJECTION, POWDER, LYOPHILIZED, FOR SOLUTION INTRAVENOUS
Status: DISCONTINUED | OUTPATIENT
Start: 2024-11-07 | End: 2024-11-07

## 2024-11-06 RX ORDER — VANCOMYCIN HYDROCHLORIDE 750 MG/150ML
750 INJECTION, SOLUTION INTRAVENOUS EVERY 12 HOURS
Status: DISCONTINUED | OUTPATIENT
Start: 2024-11-07 | End: 2024-11-07

## 2024-11-06 RX ORDER — CHLORHEXIDINE GLUCONATE ORAL RINSE 1.2 MG/ML
15 SOLUTION DENTAL 2 TIMES DAILY
Status: DISPENSED | OUTPATIENT
Start: 2024-11-06

## 2024-11-06 RX ORDER — POTASSIUM CHLORIDE 1.5 G/1.58G
40 POWDER, FOR SOLUTION ORAL EVERY 6 HOURS PRN
Status: ACTIVE | OUTPATIENT
Start: 2024-11-06

## 2024-11-06 RX ORDER — PANTOPRAZOLE SODIUM 40 MG/10ML
40 INJECTION, POWDER, LYOPHILIZED, FOR SOLUTION INTRAVENOUS
Status: DISCONTINUED | OUTPATIENT
Start: 2024-11-07 | End: 2024-11-06

## 2024-11-06 RX ORDER — ACETAMINOPHEN 160 MG/5ML
650 SOLUTION ORAL EVERY 4 HOURS PRN
Status: ACTIVE | OUTPATIENT
Start: 2024-11-06

## 2024-11-06 RX ORDER — PANTOPRAZOLE SODIUM 40 MG/1
40 TABLET, DELAYED RELEASE ORAL
Status: DISCONTINUED | OUTPATIENT
Start: 2024-11-07 | End: 2024-11-07

## 2024-11-06 RX ORDER — POTASSIUM CHLORIDE 1.5 G/1.58G
20 POWDER, FOR SOLUTION ORAL EVERY 6 HOURS PRN
Status: ACTIVE | OUTPATIENT
Start: 2024-11-06

## 2024-11-06 RX ORDER — POTASSIUM CHLORIDE 1.5 G/1.58G
40 POWDER, FOR SOLUTION ORAL EVERY 6 HOURS PRN
Status: DISCONTINUED | OUTPATIENT
Start: 2024-11-06 | End: 2024-11-06

## 2024-11-06 SDOH — SOCIAL STABILITY: SOCIAL INSECURITY: ABUSE: ADULT

## 2024-11-06 SDOH — SOCIAL STABILITY: SOCIAL INSECURITY: ARE YOU OR HAVE YOU BEEN THREATENED OR ABUSED PHYSICALLY, EMOTIONALLY, OR SEXUALLY BY ANYONE?: UNABLE TO ASSESS

## 2024-11-06 SDOH — SOCIAL STABILITY: SOCIAL INSECURITY: DOES ANYONE TRY TO KEEP YOU FROM HAVING/CONTACTING OTHER FRIENDS OR DOING THINGS OUTSIDE YOUR HOME?: UNABLE TO ASSESS

## 2024-11-06 SDOH — SOCIAL STABILITY: SOCIAL INSECURITY: HAVE YOU HAD THOUGHTS OF HARMING ANYONE ELSE?: UNABLE TO ASSESS

## 2024-11-06 SDOH — SOCIAL STABILITY: SOCIAL INSECURITY: DO YOU FEEL UNSAFE GOING BACK TO THE PLACE WHERE YOU ARE LIVING?: UNABLE TO ASSESS

## 2024-11-06 SDOH — SOCIAL STABILITY: SOCIAL INSECURITY: HAVE YOU HAD ANY THOUGHTS OF HARMING ANYONE ELSE?: UNABLE TO ASSESS

## 2024-11-06 SDOH — SOCIAL STABILITY: SOCIAL INSECURITY: HAS ANYONE EVER THREATENED TO HURT YOUR FAMILY OR YOUR PETS?: UNABLE TO ASSESS

## 2024-11-06 SDOH — SOCIAL STABILITY: SOCIAL INSECURITY: DO YOU FEEL ANYONE HAS EXPLOITED OR TAKEN ADVANTAGE OF YOU FINANCIALLY OR OF YOUR PERSONAL PROPERTY?: UNABLE TO ASSESS

## 2024-11-06 SDOH — SOCIAL STABILITY: SOCIAL INSECURITY: ARE THERE ANY APPARENT SIGNS OF INJURIES/BEHAVIORS THAT COULD BE RELATED TO ABUSE/NEGLECT?: UNABLE TO ASSESS

## 2024-11-06 SDOH — SOCIAL STABILITY: SOCIAL INSECURITY: WERE YOU ABLE TO COMPLETE ALL THE BEHAVIORAL HEALTH SCREENINGS?: NO

## 2024-11-06 ASSESSMENT — COGNITIVE AND FUNCTIONAL STATUS - GENERAL
MOVING FROM LYING ON BACK TO SITTING ON SIDE OF FLAT BED WITH BEDRAILS: TOTAL
MOVING FROM LYING ON BACK TO SITTING ON SIDE OF FLAT BED WITH BEDRAILS: TOTAL
DAILY ACTIVITIY SCORE: 6
TOILETING: TOTAL
STANDING UP FROM CHAIR USING ARMS: TOTAL
PATIENT BASELINE BEDBOUND: YES
TURNING FROM BACK TO SIDE WHILE IN FLAT BAD: TOTAL
DRESSING REGULAR LOWER BODY CLOTHING: TOTAL
DRESSING REGULAR LOWER BODY CLOTHING: TOTAL
STANDING UP FROM CHAIR USING ARMS: TOTAL
TURNING FROM BACK TO SIDE WHILE IN FLAT BAD: TOTAL
HELP NEEDED FOR BATHING: TOTAL
CLIMB 3 TO 5 STEPS WITH RAILING: TOTAL
DAILY ACTIVITIY SCORE: 6
EATING MEALS: TOTAL
STANDING UP FROM CHAIR USING ARMS: TOTAL
EATING MEALS: TOTAL
EATING MEALS: TOTAL
MOVING FROM LYING ON BACK TO SITTING ON SIDE OF FLAT BED WITH BEDRAILS: TOTAL
PERSONAL GROOMING: TOTAL
DRESSING REGULAR UPPER BODY CLOTHING: TOTAL
MOBILITY SCORE: 6
PERSONAL GROOMING: TOTAL
MOVING TO AND FROM BED TO CHAIR: TOTAL
MOBILITY SCORE: 6
TOILETING: TOTAL
MOVING TO AND FROM BED TO CHAIR: TOTAL
DRESSING REGULAR LOWER BODY CLOTHING: TOTAL
CLIMB 3 TO 5 STEPS WITH RAILING: TOTAL
DAILY ACTIVITIY SCORE: 6
HELP NEEDED FOR BATHING: TOTAL
DRESSING REGULAR UPPER BODY CLOTHING: TOTAL
CLIMB 3 TO 5 STEPS WITH RAILING: TOTAL
DRESSING REGULAR UPPER BODY CLOTHING: TOTAL
WALKING IN HOSPITAL ROOM: TOTAL
TOILETING: TOTAL
MOBILITY SCORE: 6
HELP NEEDED FOR BATHING: TOTAL
TURNING FROM BACK TO SIDE WHILE IN FLAT BAD: TOTAL
WALKING IN HOSPITAL ROOM: TOTAL
WALKING IN HOSPITAL ROOM: TOTAL
MOVING TO AND FROM BED TO CHAIR: TOTAL
PERSONAL GROOMING: TOTAL

## 2024-11-06 ASSESSMENT — LIFESTYLE VARIABLES
HAVE YOU EVER FELT YOU SHOULD CUT DOWN ON YOUR DRINKING: NO
SKIP TO QUESTIONS 9-10: 0
HOW OFTEN DO YOU HAVE 6 OR MORE DRINKS ON ONE OCCASION: PATIENT UNABLE TO ANSWER
EVER FELT BAD OR GUILTY ABOUT YOUR DRINKING: NO
TOTAL SCORE: 0
HAVE PEOPLE ANNOYED YOU BY CRITICIZING YOUR DRINKING: NO
HOW OFTEN DO YOU HAVE A DRINK CONTAINING ALCOHOL: PATIENT UNABLE TO ANSWER
EVER HAD A DRINK FIRST THING IN THE MORNING TO STEADY YOUR NERVES TO GET RID OF A HANGOVER: NO
AUDIT-C TOTAL SCORE: -1
AUDIT-C TOTAL SCORE: -1
HOW MANY STANDARD DRINKS CONTAINING ALCOHOL DO YOU HAVE ON A TYPICAL DAY: PATIENT UNABLE TO ANSWER

## 2024-11-06 ASSESSMENT — ACTIVITIES OF DAILY LIVING (ADL)
GROOMING: DEPENDENT
FEEDING YOURSELF: DEPENDENT
WALKS IN HOME: DEPENDENT
DRESSING YOURSELF: DEPENDENT
JUDGMENT_ADEQUATE_SAFELY_COMPLETE_DAILY_ACTIVITIES: UNABLE TO ASSESS
PATIENT'S MEMORY ADEQUATE TO SAFELY COMPLETE DAILY ACTIVITIES?: UNABLE TO ASSESS
ADEQUATE_TO_COMPLETE_ADL: UNABLE TO ASSESS
TOILETING: DEPENDENT
HEARING - RIGHT EAR: FUNCTIONAL
BATHING: DEPENDENT
HEARING - LEFT EAR: FUNCTIONAL

## 2024-11-06 ASSESSMENT — PATIENT HEALTH QUESTIONNAIRE - PHQ9
SUM OF ALL RESPONSES TO PHQ9 QUESTIONS 1 & 2: 0
1. LITTLE INTEREST OR PLEASURE IN DOING THINGS: NOT AT ALL
2. FEELING DOWN, DEPRESSED OR HOPELESS: NOT AT ALL

## 2024-11-06 ASSESSMENT — PAIN - FUNCTIONAL ASSESSMENT
PAIN_FUNCTIONAL_ASSESSMENT: CPOT (CRITICAL CARE PAIN OBSERVATION TOOL)
PAIN_FUNCTIONAL_ASSESSMENT: CPOT (CRITICAL CARE PAIN OBSERVATION TOOL)
PAIN_FUNCTIONAL_ASSESSMENT: UNABLE TO SELF-REPORT

## 2024-11-06 NOTE — H&P
Critical Care Medicine History and Physical        Subjective   Patient is a 59 y.o. male admitted on 11/6/2024  9:15 AM       HPI     Maximo Pablo is a 59 y.o. year old male patient with past medical history of cerebral palsy, MRDD, nonverbal at baseline, epilepsy, HTN, CKD stage II, SBO who was admitted to the ICU for septic shock and requiring pressor support.     Patient presented to [facility] ED on 11/6 due to concern for hypothermia.  History was obtained from caretaker who is in the room with the patient as the patient is nonverbal at baseline.  She reports that his oral intake has been reduced over the past week or so.  He normally does feed himself, though he is on a puréed diet, but he has not been eating normally for the past week.  She does not think he ate any lunch or dinner yesterday.  He had minimal breakfast today.  They took his temperature while at his group home, and noted that it was initially 96 °F and then 95 °F on recheck.  His limbs were cold to the touch.  Thus he was brought in for further evaluation.  She denies anything like this ever happening before.  Aside from some constipation, he has not had any other concerning symptoms.  She reports that constipation does happen every so often this patient, and he does normally get better with suppositories followed by an enema.  This was given to him yesterday at his facility, and he did have 2 bowel movements.  Per her report, these bowel movements were relatively normal, not liquidy.    ED course:   Vitals on his and presentation to the ED: T31.7, HR 47, RR 18, /80, SpO2 96% on room air  Labs: CMP showed sodium 137, potassium 4.3, hypochloremia at 97, bicarb elevated 36, normal kidney function BUN/creatinine 10/1.19.  Elevated alkaline phosphatase of 258, AST 56, ALT 39.  T. bili 0.2.  CBC significant for leukopenia at 2.8, thrombocytopenia at 85, hemoglobin within normal limits at 13.6.  Absolute lymphocyte count low at 0.88.  Lactate 1.   Troponin 17.  Lipase elevated at 114.  TSH 3.31.  ECG: Sinus bradycardia with first-degree AV block, HR 48.  Imaging:  -CXR showed low inspiratory volume with mild bibasilar atelectasis  -CT abdomen pelvis showed moderate residual stool throughout the colon, no evidence of bowel obstruction, free intraperitoneal air, or abnormal intra-abdominal fluid collection  Intervention: Patient presented with moderate hypothermia, sepsis protocol was initiated.  Imaging revealed chronic constipation as well as a small ventral hernia.  Was cool to the touch.  Temperature-sensing Robles catheter inserted for serial core temperatures.  Low suspicion for myxedema coma given normal TSH.  Given 1 L lactated ringer.  Rewarming was initiated with Dorie hugger, patient became hypotensive with rewarming.  Levophed started for blood pressure control, in addition to an additional liter of IV fluids.  Improvement noted in blood pressure.  External warming blanket discontinued prior to being brought up to the floor, as his core temperature was greater than 34 °C.  At time of acceptance to the ICU, norepinephrine at 0.11 mcg/kg/min.     Past Medical History:   Diagnosis Date    Epilepsy, unspecified, not intractable, without status epilepticus     Epilepsy    Hyperlipidemia, unspecified     Dyslipidemia    Personal history of other diseases of the circulatory system     History of hypertension    Personal history of other diseases of the nervous system and sense organs     History of cerebral palsy    Personal history of other endocrine, nutritional and metabolic disease     History of vitamin D deficiency    Personal history of other specified conditions     History of dysphagia     Past Surgical History:   Procedure Laterality Date    OTHER SURGICAL HISTORY  11/26/2019    No history of surgery     (Not in a hospital admission)    Patient has no known allergies.  Social History     Tobacco Use    Smoking status: Never    Smokeless tobacco:  Never   Substance Use Topics    Alcohol use: Defer    Drug use: Defer     No family history on file.    Review of Systems:  Review of Systems   Unable to perform ROS: Patient nonverbal        Objective     PHYSICAL EXAM     Physical Exam   General: Not in acute distress, nonverbal at baseline, alert, does not follow commands  HEENT: Normocephalic, atraumatic, EOMI, moist mucous membranes, some white discharge noted from eyes bilaterally, no conjunctivitis  Neck: Neck supple, trachea midline, no evidence of trauma  Cardiovascular: RRR, S1 and S2 appreciated, no murmurs rubs gallops appreciated  Respiratory: Diminished breath sounds noted bilaterally with some intermittent wheezing, both inspiratory and expiratory, no acute respiratory distress noted  GI: Abdomen soft, nondistended, nontender to palpation.  Ventral hernia palpated the periumbilical region, nontender and easily reducible.  Extremities: No edema appreciated in lower extremities bilaterally, no cyanosis  Neuro: A&O x3, no focal deficits, strength and sensation intact bilaterally  Skin: No longer cool to the touch, dry.  No signs of frostbite.  Linear abrasion noted on the right upper extremity without any surrounding erythema or purulence.  Minor excoriation in the groin bilaterally.  Otherwise no skin changes noted.    Vitals:  Most Recent:  Vitals:    11/06/24 1600   BP: 123/60   Pulse: 78   Resp: 13   Temp:    SpO2: 99%       Scheduled Medications:   [Held by provider] amLODIPine, 10 mg, oral, Daily  aspirin, 81 mg, oral, Daily  chlorhexidine, 15 mL, Mouth/Throat, BID  cholecalciferol, 1,000 Units, oral, Daily  [Held by provider] cloNIDine, 0.2 mg, oral, BID  enoxaparin, 40 mg, subcutaneous, q24h  [START ON 11/7/2024] pantoprazole, 40 mg, oral, Daily before breakfast   Or  [START ON 11/7/2024] esomeprazole, 40 mg, nasoduodenal tube, Daily before breakfast   Or  [START ON 11/7/2024] pantoprazole, 40 mg, intravenous, Daily before breakfast  insulin  lispro, 0-5 Units, subcutaneous, q4h  [Held by provider] labetalol, 100 mg, oral, BID  levETIRAcetam, 500 mg, oral, BID  [Held by provider] lisinopril, 20 mg, oral, BID  OXcarbazepine, 150 mg, oral, BID  OXcarbazepine, 300 mg, oral, BID  piperacillin-tazobactam, 3.375 g, intravenous, q6h  polyethylene glycol, 17 g, oral, BID  psyllium, 1 packet, oral, Daily  vancomycin, 1,000 mg, intravenous, Once         Continuous Medications:   norepinephrine, 0-0.2 mcg/kg/min, Last Rate: 0.11 mcg/kg/min (11/06/24 1350)         PRN Medications:   PRN medications: acetaminophen **OR** acetaminophen **OR** acetaminophen, bisacodyl, dextrose, dextrose, glucagon, glucagon, magnesium sulfate, magnesium sulfate, oxygen, potassium chloride CR **OR** potassium chloride, potassium chloride CR **OR** potassium chloride, potassium chloride, sodium phosphates, vancomycin    24hr Min/Max:  Temp  Min: 31.6 °C (88.9 °F)  Max: 34.4 °C (93.9 °F)  Pulse  Min: 38  Max: 78  BP  Min: 67/39  Max: 127/74  Resp  Min: 7  Max: 29  SpO2  Min: 84 %  Max: 100 %    LDA:   External Urinary Catheter Male (Active)   Placement Date/Time: 11/06/24 1559   Placed by: alejandra  Hand Hygiene Completed: Yes  External Catheter Type: Male  External Urinary Catheter Sizes: Other (Comment)   Number of days: 0         Vent settings: None       Hemodynamic parameters for last 24 hours: Goal MAP > 65       No intake or output data in the 24 hours ending 11/06/24 1702    Lab/Radiology/Diagnostic Review:  Results for orders placed or performed during the hospital encounter of 11/06/24 (from the past 24 hours)   CBC with Differential   Result Value Ref Range    WBC 2.8 (L) 4.4 - 11.3 x10*3/uL    nRBC 0.0 0.0 - 0.0 /100 WBCs    RBC 5.15 4.50 - 5.90 x10*6/uL    Hemoglobin 13.6 13.5 - 17.5 g/dL    Hematocrit 41.1 41.0 - 52.0 %    MCV 80 80 - 100 fL    MCH 26.4 26.0 - 34.0 pg    MCHC 33.1 32.0 - 36.0 g/dL    RDW 15.6 (H) 11.5 - 14.5 %    Platelets 85 (L) 150 - 450 x10*3/uL    Neutrophils  % 60.2 40.0 - 80.0 %    Immature Granulocytes %, Automated 0.4 0.0 - 0.9 %    Lymphocytes % 31.8 13.0 - 44.0 %    Monocytes % 5.4 2.0 - 10.0 %    Eosinophils % 2.2 0.0 - 6.0 %    Basophils % 0.0 0.0 - 2.0 %    Neutrophils Absolute 1.67 1.20 - 7.70 x10*3/uL    Immature Granulocytes Absolute, Automated 0.01 0.00 - 0.70 x10*3/uL    Lymphocytes Absolute 0.88 (L) 1.20 - 4.80 x10*3/uL    Monocytes Absolute 0.15 0.10 - 1.00 x10*3/uL    Eosinophils Absolute 0.06 0.00 - 0.70 x10*3/uL    Basophils Absolute 0.00 0.00 - 0.10 x10*3/uL   Comprehensive Metabolic Panel   Result Value Ref Range    Glucose 79 74 - 99 mg/dL    Sodium 137 136 - 145 mmol/L    Potassium 4.3 3.5 - 5.3 mmol/L    Chloride 97 (L) 98 - 107 mmol/L    Bicarbonate 36 (H) 21 - 32 mmol/L    Anion Gap 8 (L) 10 - 20 mmol/L    Urea Nitrogen 10 6 - 23 mg/dL    Creatinine 1.19 0.50 - 1.30 mg/dL    eGFR 71 >60 mL/min/1.73m*2    Calcium 11.1 (H) 8.6 - 10.3 mg/dL    Albumin 4.0 3.4 - 5.0 g/dL    Alkaline Phosphatase 258 (H) 33 - 120 U/L    Total Protein 8.2 6.4 - 8.2 g/dL    AST 56 (H) 9 - 39 U/L    Bilirubin, Total 0.2 0.0 - 1.2 mg/dL    ALT 39 10 - 52 U/L   Lactate   Result Value Ref Range    Lactate 1.0 0.4 - 2.0 mmol/L   TSH with reflex to Free T4 if abnormal   Result Value Ref Range    Thyroid Stimulating Hormone 3.31 0.44 - 3.98 mIU/L   Troponin I, High Sensitivity, Initial   Result Value Ref Range    Troponin I, High Sensitivity 17 0 - 20 ng/L   Lipase   Result Value Ref Range    Lipase 114 (H) 9 - 82 U/L   Light Blue Top   Result Value Ref Range    Extra Tube Hold for add-ons.    SST TOP   Result Value Ref Range    Extra Tube Hold for add-ons.    Morphology   Result Value Ref Range    RBC Morphology See Below     Giant Platelets Few    POCT GLUCOSE   Result Value Ref Range    POCT Glucose 76 74 - 99 mg/dL   Troponin, High Sensitivity, 1 Hour   Result Value Ref Range    Troponin I, High Sensitivity 16 0 - 20 ng/L     CT abdomen pelvis w IV contrast    Result Date:  11/6/2024  Interpreted By:  Odell Lopez, STUDY: CT ABDOMEN PELVIS W IV CONTRAST; 11/6/2024 2:31 pm   INDICATION: Signs/Symptoms:constipation, hypothermia.   COMPARISON: 10/30/2024   ACCESSION NUMBER(S): [id number]   ORDERING CLINICIAN: ALVARO GARCIA   TECHNIQUE: Contiguous axial images were obtained at 3mm slice thickness through the abdomen and pelvis following intravenous contrast administration. Coronal and sagittal reconstructions at 3 mm slice thickness were performed. 75 ML of Omnipaque 350 was administered.   FINDINGS: LOWER CHEST: Evaluation of the visualized lung bases demonstrates right basilar airspace consolidation, may represent atelectasis and/or pneumonia. The heart is within normal limits for size.   ABDOMEN:   LIVER: The liver is within normal limits for appearance, without evidence of focal masses.   BILE DUCTS: No definite intra or extrahepatic biliary dilatation is identified.   GALLBLADDER: The gallbladder is nondilated. No definite calcified gallstones are seen.   PANCREAS: The pancreas is within normal limits for appearance, without evidence of focal masses.   SPLEEN: The spleen is within normal limits for size. No focal splenic mass is seen.   ADRENAL GLANDS: No definite adrenal nodules or masses are seen bilaterally.   KIDNEYS AND URETERS: There is no hydronephrosis, hydroureter or renal/ ureteral calculus identified bilaterally. No definite focal renal mass is seen, though evaluation is severely limited by the lack of intravenous contrast.   PELVIS:   BLADDER: The urinary bladder is grossly unremarkable for CT appearance.   REPRODUCTIVE ORGANS: The prostate is within normal limits for appearance.   BOWEL: A moderate amount of residual stool is seen throughout the colon. The colon and small bowel are within normal limits for course, caliber and appearance, without evidence of wall thickening or obstruction. The appendix is decompressed. No CT evidence of acute diverticulitis or  appendicitis is seen.   VESSELS: The abdominal aorta is within normal limits for course, caliber and appearance, without evidence of aneurysm.   PERITONEUM/RETROPERITONEUM/LYMPH NODES: There is no free intraperitoneal air or free fluid identified. No gross mesenteric or retroperitoneal lymphadenopathy is identified.   BONE AND SOFT TISSUE: There is no evidence of acute fracture identified. No evidence of abdominal wall mass or hernia is identified.       1. Moderate residual stool throughout the colon, as above. 2. No evidence of bowel obstruction, free intraperitoneal air or abnormal intra-abdominal fluid collection.   MACRO: None   Signed by: Odell Lopez 11/6/2024 2:49 PM Dictation workstation:   [id number]    XR chest 1 view    Result Date: 11/6/2024  Interpreted By:  Harry Stover, STUDY: XR CHEST 1 VIEW;  11/6/2024 11:35 am   INDICATION: Signs/Symptoms:hypothermia.     COMPARISON: 08/12/2024.   ACCESSION NUMBER(S): [id number]   ORDERING CLINICIAN: ALVARO GARCIA   FINDINGS: CARDIOMEDIASTINAL SILHOUETTE: Cardiac silhouette is stable and not significantly enlarged.   LUNGS: Inspiratory volume is low with mild bibasilar atelectasis. No definite pleural effusion. No pneumothorax is seen.   ABDOMEN: Mild gaseous distention of upper abdominal bowel is noted.   BONES: Thoracolumbar mild levocurvature may be exaggerated by positioning.       1.  Low inspiratory volume with mild bibasilar atelectasis.       MACRO: None.   Signed by: Harry Stover 11/6/2024 12:02 PM Dictation workstation:   [id number]      Additional Labs:  Lab Results   Component Value Date    WBC 2.8 (L) 11/06/2024    WBC 3.4 (L) 10/25/2024    WBC 5.1 10/24/2024    PLT 85 (L) 11/06/2024     10/25/2024     10/24/2024     11/06/2024     (L) 11/04/2024     (L) 10/25/2024    K 4.3 11/06/2024    K 4.8 11/04/2024    K 3.9 10/25/2024    BUN 10 11/06/2024    BUN 9 11/04/2024    BUN 6 10/25/2024    CREATININE 1.19  11/06/2024    CREATININE 1.19 11/04/2024    CREATININE 0.67 10/25/2024    MG 2.60 (H) 10/25/2024    MG 1.94 10/24/2024    MG 1.82 08/16/2024        Assessment   Principal Problem:    Hypothermia, initial encounter      ASSESSMENT   Patient is a 59-year-old male who presented to [facility] ED with primary complaint of hypothermia.  Patient was treated with rewarming and subsequently developed hypotension.  He was given a total of 2 L lactated Ringer's and was started on norepinephrine.  Concern for septic shock given his hypothermia and leukopenia with hypotension necessitating pressor support.  Infectious workup initiated.  Patient will be admitted to the ICU due to hemodynamic monitoring and norepinephrine drip.    Plan    PLAN     Neuro:   #Hypothermia  #Epilepsy  #MRDD  #Cerebral palsy  -Patient presented to the ED hypothermic, initial temperature recorded 31.7 °C.  Patient was responsive to external warming blankets.  Temperature did improve to greater than 34 °C, this blankets discontinued.  Most recent temperature recorded upon presentation to the ICU was 36 °C.  -History: epilepsy, cerebral palsy, MRDD, nonverbal at baseline  -Home meds: Keppra, Trileptal  -Pain: cannot be assessed secondary to patient's nonverbal status  -Seizure precautions  -Continue to monitor temperature  -[facility] ICU    Cardiac:   #Shock - distributive vs hypovolemic vs septic  #Hypotension  #First degree AV block  -Initially on presentation to the ED, patient's blood pressure was normotensive at 126/80.  However once rewarming therapy was started his blood pressure did drop, lowest charted 73/40.  He was not responsive to 1 L of fluids thus was started on Levophed.  -Initial ECG revealed bradycardia (48), sinus rhythm.  First-degree AV block with [place] interval 282.   -History: HTN  -Home meds: amlodipine (H), aspirin, clonidine (H), labetalol (H), lisinopril (H)  -Continuous cardiac monitoring  -Monitor hemodynamics  -Titrate IV pressors, on  norepinephrine   -Maintain Goal MAP > 65  -Monitor and optimize electrolytes, keep K > 4.0, Mag > 2.0    Pulmonary:   -No active acute issues at this time  -History: None  -Imaging:   --CXR showed low inspiratory volume with mild bibasilar atelectasis  -Continuous pulse oximetry   -O2 PRN to maintain SpO2 > 94%  -DuoNebs q4 hours prn wheezing        Gastrointestinal:   #Constipation  #Transaminatis  #Elevated lipase  #Ventral hernia  -Patient with chronic constipation, responsive to suppositories and enema  -CT A/P showed residual stool throughout colon without evidence of bowel obstruction, free intraperitoneal air or abnormal intra-abdominal fluid collection  -Elevated alkaline phosphataseon initial labs. AST elevated, ALT within normal limits. Elevated lipase, but amylase within normal limits. Initial concern for possible pancreatitis but exam benign.   -Periumbilical ventral hernia noted on exam, nontender to palpation.  Reducible on exam.  No evidence of strangulation on imaging.  -NG tube placed for feeding and medications, nutrition consulted for enteral feeds  -Thiamine 100mg daily due to concern for refeeding given poor oral intake over the past week  -History: Chronic constipation, history of dysphagia, history of small bowel obstruction (August 2024)  -Home meds: bisacodyl suppository, omeprazole, miralax, metamucil, Katty-Colace, Fleet enema  -Diet: NPO   -Prophylaxis: Protonix  -Bowel regimen: Miralax, dulcolax suppository prn, fleet enema prn  -Last BM:  Yesterday, per report from patient's caretaker    Renal:   #CKD stage II  #Oliguria  -Swain placed 11/6 for accurate core temperature management, strict I&Os  -Per ED documentation, attempt was made at swain irrigation but no drainage. Swain removed and straight cath attempted but unable to be advanced so external catheter was placed. Blood clot was noted.   -Continue to monitor urine output, bladder scan as needed  -Daily RFP,Mg,Phos  -History: CKD  stage II  -S/p 2L lactated ringers, ordered additional 500cc LR   -Electrolytes: Replete per protocol, goal K>4 Phos >3 Mg >2  Net IO Since Admission: No IO data has been entered for this period [24 1702]  Results from last 72 hours   Lab Units 24  1021 24  0625   BUN mg/dL 10 9   CREATININE mg/dL 1.19 1.19         Endocrine:   -No active acute issues at this time  -History: none  -Monitor blood glucose q4 hours, continue Novolog SSC for glycemic control  -Goal BS < 180  -Follow hypoglycemic protocol  -No history of diabetes mellitus, most recent hemoglobin A1C was 5.6 in 2024  Results from last 7 days   Lab Units 24  1025 24  1021 24  0625   POCT GLUCOSE mg/dL 76  --   --    GLUCOSE mg/dL  --  79 80        Hematology:   #Thrombocytopenia  -Patient presents with thrombocytopenia (85), no clear etiology identified  -Daily CBC  -History: none  - DVT Prophylaxis: Lovenox  Results from last 7 days   Lab Units 24  1021   HEMOGLOBIN g/dL 13.6   HEMATOCRIT % 41.1   PLATELETS AUTO x10*3/uL 85*       Infectious Disease:   #Concern for septic shock   #Leukopenia  -Patient presented hypothermic and leukopenic, triggering concern for possible sepsis. No clear infectious etiology at this time - UA pending, CXR shows no signs of pneumonia, no active signs of intraabdominal infection on imaging at this time  -Will continue to monitor for signs and symptoms of infection, continue antibiotic management  -History: prior history of aspiration pneumonia  -Abx: zosyn (start date ), vancomycin (start date )  -Cultures:   --Blood cultures x2 collected  --UA collected  Results from last 7 days   Lab Units 24  1021   WBC AUTO x10*3/uL 2.8*     Temp (24hrs), Av.6 °C (90.7 °F), Min:31.6 °C (88.9 °F), Max:34.4 °C (93.9 °F)       Musculoskeletal:   -No acute active issues  -History: cerebral palsy  -PT/OT to evaluate    Integumentary:   -No acute active issues  -Management  per ICU protocol  -History: atopic dermatitis  -Home meds: Dupixent    Lines/Tubes/Drains:   PIVs, Robles catheter placed 11/6      Code status: Full Code     Dispo: Patient to remain in ICU    Patient was seen and discussed with GREG. To be staffed with attending.     Krupa Cotton,    Internal Medicine PGY-1 Resident

## 2024-11-06 NOTE — CONSULTS
Vancomycin Dosing by Pharmacy- INITIAL    Maximo Pablo is a 59 y.o. year old male who Pharmacy has been consulted for vancomycin dosing for other septic shock . Based on the patient's indication and renal status this patient will be dosed based on a goal AUC of 400-600.     Renal function is currently stable.    Visit Vitals  /67 (BP Location: Left arm, Patient Position: Lying)   Pulse 74   Temp 36 °C (96.8 °F) (Temporal)   Resp 13        Lab Results   Component Value Date    CREATININE 1.19 2024    CREATININE 1.19 2024    CREATININE 0.67 10/25/2024    CREATININE 0.72 10/24/2024        Patient weight is as follows:   Vitals:    24 0912   Weight: 68 kg (150 lb)       Cultures:  No results found for the encounter in last 14 days.        No intake/output data recorded.  I/O during current shift:  No intake/output data recorded.    Temp (24hrs), Av.7 °C (90.9 °F), Min:31.6 °C (88.9 °F), Max:36 °C (96.8 °F)         Assessment/Plan     Patient has already been given a loading dose of 1000 mg.  Will initiate vancomycin maintenance,  750 mg every 12 hours.    This dosing regimen is predicted by [employer] to result in the following pharmacokinetic parameters:  Loading dose: 1000 mg  Regimen: 750 mg IV every 12 hours.  Start time: 17:42 on 2024  Exposure target: AUC24 (range)400-600 mg/L.hr   [id number]: 377 mg/L.hr  AUC24,ss: 503 mg/L.hr  Probability of AUC24 > 400: 76 %  Ctrough,ss: 16.6 mg/L  Probability of Ctrough,ss > 20: 31 %      Follow-up level will be ordered on  at 1000 unless clinically indicated sooner.  Will continue to monitor renal function daily while on vancomycin and order serum creatinine at least every 48 hours if not already ordered.  Follow for continued vancomycin needs, clinical response, and signs/symptoms of toxicity.       Marielena Leonard, PharmD

## 2024-11-06 NOTE — ED NOTES
Patient to exam 11 from triage; patient placed in cart and on monitors; patient's skin feels cold and the slipper socks he was wearing were saturated with water; the yomi pad the patient was on was also wet in some places; patient's brief is dry; caregiver at bedside stated that she is unaware of how the patient became wet; Dr Montalvo updated; patient placed on monitors and luis angel hugger placed; cart in locked, low position; no needs at this time.     Dory Veloz RN  11/06/24 0901    Attempt to irrigate swain made; swain not draining; swain pulled and straight cath attemped per dr Montalvo; unable to advance past the prostate; DO Karim updated and external cath placed.     Dory Veloz RN  11/06/24 1606    CCM down to see the patient and it appears that the RUE above the elbow is edematous; the IV in the upper are was ultrasound placed by Елена Cantrell RN at 1200; Levophed is infusing in the 20G IV in the back of the right forearm. Resident to be updated to assess the patient.     Dory Veloz RN  11/06/24 3646

## 2024-11-06 NOTE — ED PROVIDER NOTES
History of Present Illness     History provided by: Caregiver  Limitations to History: Mental Illness  External Records Reviewed with Brief Summary:  progress notes    HPI:  Maximo Pablo is a 59 y.o. male with PMHX of cerebral palsy, MRDD, nonverbal at baseline, epilepsy, HTN, SBO, who presents for hypothermia.  Patient is here with caretaker from [facility].  Caretaker reports that patient had his temperature checked this morning and it was 96 °F and then 95 °F.  Reports that patient has been fatigued for the last week with decreased p.o. intake.  Also reports that patient has been constipated for a couple of days but he was given suppositories and enemas and was able to have 2 bowel movements yesterday.  Also reports that patient has a scratch on his right upper arm.  Patient is compliant with medications.Denies cold exposure, urinary odor or change in color, sick contacts, or new medications.    Physical Exam   Triage vitals:  T (!) 31.7 °C (89 °F)  HR (!) 47  /80  RR 18  O2 96 % None (Room air)    Physical Exam  Constitutional:       General: He is not in acute distress.     Comments: Patient is alert and laying in bed, nonverbal. He feels cold to the touch,.    HENT:      Head: Normocephalic and atraumatic.   Cardiovascular:      Rate and Rhythm: Bradycardia present.   Pulmonary:      Effort: Pulmonary effort is normal. No respiratory distress.      Breath sounds: Normal breath sounds. No wheezing.   Abdominal:      Tenderness: There is no abdominal tenderness. There is no guarding or rebound.      Hernia: A hernia is present.   Musculoskeletal:      Right lower leg: No edema.      Left lower leg: No edema.      Comments: Abrasion on RUE, with no surrounding erythema or purulence.    Skin:     Comments: No sign of frostbite.    Neurological:      Mental Status: He is alert. Mental status is at baseline.   Psychiatric:         Mood and Affect: Mood normal.         Behavior: Behavior normal.         Medical Decision Making & ED Course   Medical Decision Makin y.o. male with PMHX of cerebral palsy, MRDD, nonverbal at baseline, epilepsy, HTN, SBO, who presents for hypothermia. Patient presented with hypothermia of 89 F. Sepsis protocol was initiated and warming blanket was applied to patient.     Patient given 1 L LR.     CBC showed leukopenia which appears to be chronic for patient. CMP showed elevated alk phosphatase 219. Lactate wnl. Lipase 114. Troponin negative. TSH wnl.     CXR showed low inspiratory volume with mild bibasilar atelectasis.     CT abdomen pelvis showed moderate residual stool throughout the colon.     After warming blanket, patient became hypotensive and was started on levophed. He was given another 1 L of LR and was given vancomycin and zosyn.     Patient's BP and HR improved, and patient was admitted to ICU for further workup.     Differential diagnoses considered include but are not limited to: infectious etiology such as pneumonia, UTI     Social Determinants of Health which Significantly Impact Care: Mental health disorder     Independent Result Review and Interpretation: Relevant laboratory and radiographic results were reviewed and independently interpreted by myself.  As necessary, they are commented on in the ED Course.    Chronic conditions affecting the patient's care: As documented above in [facility]    The patient was discussed with the following consultants/services: None    Care Considerations: As documented above in [facility]    ED Course:  ED Course as of 24   0950 Patient with moderate hypothermia long-term etiology this point.  Sepsis protocol initiated.  Cortisol level be obtained.  Patient with acute on chronic constipation will obtain CT imaging the abdomen.  Does have a small ventral hernia that is periumbilical in nature but easily reducible and nontender.  He is cold to touch.  Meeting moderate hypothermia criteria at this time.   Temperature sensing Robles catheter to be obtained to get serial core temperatures, patient is hypothermic and will obtain EKG.  TSH to be obtained.  Patient with baseline mental status that is nonverbal.  Does make good eye contact and tracks the provider on the room.  Per caregiver at bedside he is full code. [TL]   1128 EKG performed at 11: 22 and independently reviewed by provider: Reveals sinus bradycardia with a rate of 48 bpm , rightward axis, normal intervals, no ST changes, nonspecific T wave flattening with T wave inversions noted in lead V1, V2, no ectopy. No STEMI. [TL]   1130 TSH within normal limits which I believe safely excludes myxedema coma,. [TL]   1246 Re-evaluated patient after he was started on pressors and an additional L of IV fluids, and he is improving with BP of 89/59 and HR of 51. Normal capillary refill.  [HK]   1456 External warming blanket discontinued at this time given that temperatures greater than 34 °C internally/with core temp.  Patient clinical status remains unchanged other than the remains on norepinephrine at 0.11 mics per kilo per minute.  Heart rate at this time is 74 bpm. [TL]   1532 Patient accepted to ICU at this time. No further recommendations.  [TL]      ED Course User Index  [HK] Guicho Mccormick DO  [TL] Tim Montalvo DO         Diagnoses as of 11/06/24 1943   Hypothermia, initial encounter     Disposition   As a result of their workup, the patient will require admission to the hospital.  The patient was informed of his diagnosis.  The patient was given the opportunity to ask questions and I answered them. The patient agreed to be admitted to the hospital.    Procedures   Critical Care    Performed by: Tim Montalvo DO  Authorized by: Tim Montalvo DO    Critical care provider statement:     Critical care time (minutes):  45    Critical care time was exclusive of:  Separately billable procedures and treating other patients and teaching time    Critical care was  necessary to treat or prevent imminent or life-threatening deterioration of the following conditions:  Shock    Critical care was time spent personally by me on the following activities:  Ordering and performing treatments and interventions, ordering and review of laboratory studies, ordering and review of radiographic studies, pulse oximetry, re-evaluation of patient's condition, review of old charts, obtaining history from patient or surrogate, evaluation of patient's response to treatment, development of treatment plan with patient or surrogate and examination of patient    Care discussed with: admitting provider        Patient seen and discussed with ED attending physician.    Guicho Mccormick DO  Emergency Medicine     Guicho Mccormick DO  Resident  11/06/24 1959    I performed a history and physical examination of the patient and discussed his management with the resident physician.  I agree with the history, physical, assessment, and plan of care, with the following exceptions:   Patient presented to the emergency department for mildly low blood pressure on temporal thermometer at group home.  They reported decreased p.o. intake and generalized malaise of the last several days.  Recent hospitalization for stercoral colitis and constipation.  They report that it continued at home.  He did have diffuse abdominal discomfort on examination but history is generally unreliable.  He did have a small superficial cut to the his right upper extremity that did not require sutures and did not show any signs of secondary infection.  Medications were reviewed and no sign of chronic steroid use to be concern for adrenal crisis.  No known history of hypothyroidism but this was assessed and normal TSH and T4.  Patient initially normotensive with reassuring vital signs however after external warming was initiated and warming with IV fluids was initiated patient became hypotensive likely from vasoplegia/vasodilation.  No source of  infection identified at time of admission however patient was started on broad-spectrum antibiotics and blood cultures and lactic acid were obtained.  Cortisol level was ordered and pending at time of admission to the ICU.  ICU physician kindly accepted the patient had no further recommendations at time of admission.  Patient is a presumed full code however guardian/decision maker is not at bedside.    I was present for key and critical portions of the following procedures: Critical care  Time Spent in Critical Care of the patient: 45  Time spent in discussions with the patient and family: 30    DO Tim Gerber DO  11/07/24 1944

## 2024-11-07 ENCOUNTER — APPOINTMENT (OUTPATIENT)
Dept: RADIOLOGY | Facility: HOSPITAL | Age: 59
End: 2024-11-07
Payer: MEDICAID

## 2024-11-07 PROBLEM — K06.9 GUM DISEASE: Status: ACTIVE | Noted: 2024-09-09

## 2024-11-07 LAB
ALBUMIN SERPL BCP-MCNC: 3.3 G/DL (ref 3.4–5)
ANION GAP SERPL CALC-SCNC: 10 MMOL/L (ref 10–20)
APPEARANCE UR: CLEAR
BILIRUB UR STRIP.AUTO-MCNC: NEGATIVE MG/DL
BUN SERPL-MCNC: 7 MG/DL (ref 6–23)
CALCIUM SERPL-MCNC: 9.7 MG/DL (ref 8.6–10.3)
CHLORIDE SERPL-SCNC: 98 MMOL/L (ref 98–107)
CO2 SERPL-SCNC: 31 MMOL/L (ref 21–32)
COLOR UR: YELLOW
CREAT SERPL-MCNC: 1.04 MG/DL (ref 0.5–1.3)
EGFRCR SERPLBLD CKD-EPI 2021: 84 ML/MIN/1.73M*2
ERYTHROCYTE [DISTWIDTH] IN BLOOD BY AUTOMATED COUNT: 15.6 % (ref 11.5–14.5)
GLUCOSE BLD MANUAL STRIP-MCNC: 119 MG/DL (ref 74–99)
GLUCOSE BLD MANUAL STRIP-MCNC: 128 MG/DL (ref 74–99)
GLUCOSE BLD MANUAL STRIP-MCNC: 136 MG/DL (ref 74–99)
GLUCOSE BLD MANUAL STRIP-MCNC: 210 MG/DL (ref 74–99)
GLUCOSE BLD MANUAL STRIP-MCNC: 66 MG/DL (ref 74–99)
GLUCOSE BLD MANUAL STRIP-MCNC: 67 MG/DL (ref 74–99)
GLUCOSE BLD MANUAL STRIP-MCNC: 79 MG/DL (ref 74–99)
GLUCOSE BLD MANUAL STRIP-MCNC: 88 MG/DL (ref 74–99)
GLUCOSE BLD MANUAL STRIP-MCNC: 93 MG/DL (ref 74–99)
GLUCOSE SERPL-MCNC: 99 MG/DL (ref 74–99)
GLUCOSE UR STRIP.AUTO-MCNC: NORMAL MG/DL
HCT VFR BLD AUTO: 37.2 % (ref 41–52)
HGB BLD-MCNC: 12.2 G/DL (ref 13.5–17.5)
KETONES UR STRIP.AUTO-MCNC: ABNORMAL MG/DL
LEUKOCYTE ESTERASE UR QL STRIP.AUTO: NEGATIVE
LEVETIRACETAM SERPL-MCNC: 22 UG/ML (ref 10–40)
MAGNESIUM SERPL-MCNC: 1.6 MG/DL (ref 1.6–2.4)
MCH RBC QN AUTO: 26.3 PG (ref 26–34)
MCHC RBC AUTO-ENTMCNC: 32.8 G/DL (ref 32–36)
MCV RBC AUTO: 80 FL (ref 80–100)
NITRITE UR QL STRIP.AUTO: NEGATIVE
NRBC BLD-RTO: 0.4 /100 WBCS (ref 0–0)
PATH REVIEW-CBC DIFFERENTIAL: NORMAL
PH UR STRIP.AUTO: 6 [PH]
PHOSPHATE SERPL-MCNC: 2.9 MG/DL (ref 2.5–4.9)
PLATELET # BLD AUTO: 75 X10*3/UL (ref 150–450)
POTASSIUM SERPL-SCNC: 4.1 MMOL/L (ref 3.5–5.3)
PROCALCITONIN SERPL-MCNC: 0.02 NG/ML
PROT UR STRIP.AUTO-MCNC: ABNORMAL MG/DL
RBC # BLD AUTO: 4.64 X10*6/UL (ref 4.5–5.9)
RBC # UR STRIP.AUTO: ABNORMAL /UL
RBC #/AREA URNS AUTO: >20 /HPF
SODIUM SERPL-SCNC: 135 MMOL/L (ref 136–145)
SP GR UR STRIP.AUTO: >1.05
UROBILINOGEN UR STRIP.AUTO-MCNC: NORMAL MG/DL
VANCOMYCIN SERPL-MCNC: 24.4 UG/ML (ref 5–20)
WBC # BLD AUTO: 4.7 X10*3/UL (ref 4.4–11.3)
WBC #/AREA URNS AUTO: ABNORMAL /HPF

## 2024-11-07 PROCEDURE — 84100 ASSAY OF PHOSPHORUS: CPT

## 2024-11-07 PROCEDURE — 2500000004 HC RX 250 GENERAL PHARMACY W/ HCPCS (ALT 636 FOR OP/ED)

## 2024-11-07 PROCEDURE — 2500000005 HC RX 250 GENERAL PHARMACY W/O HCPCS

## 2024-11-07 PROCEDURE — 83735 ASSAY OF MAGNESIUM: CPT

## 2024-11-07 PROCEDURE — 2060000001 HC INTERMEDIATE ICU ROOM DAILY

## 2024-11-07 PROCEDURE — 85060 BLOOD SMEAR INTERPRETATION: CPT | Performed by: PATHOLOGY

## 2024-11-07 PROCEDURE — 2500000001 HC RX 250 WO HCPCS SELF ADMINISTERED DRUGS (ALT 637 FOR MEDICARE OP)

## 2024-11-07 PROCEDURE — 36415 COLL VENOUS BLD VENIPUNCTURE: CPT

## 2024-11-07 PROCEDURE — 82947 ASSAY GLUCOSE BLOOD QUANT: CPT

## 2024-11-07 PROCEDURE — 71045 X-RAY EXAM CHEST 1 VIEW: CPT

## 2024-11-07 PROCEDURE — 71045 X-RAY EXAM CHEST 1 VIEW: CPT | Performed by: RADIOLOGY

## 2024-11-07 PROCEDURE — 85027 COMPLETE CBC AUTOMATED: CPT

## 2024-11-07 PROCEDURE — 2500000002 HC RX 250 W HCPCS SELF ADMINISTERED DRUGS (ALT 637 FOR MEDICARE OP, ALT 636 FOR OP/ED)

## 2024-11-07 PROCEDURE — 80202 ASSAY OF VANCOMYCIN: CPT

## 2024-11-07 PROCEDURE — 81001 URINALYSIS AUTO W/SCOPE: CPT

## 2024-11-07 RX ORDER — SODIUM CHLORIDE, SODIUM LACTATE, POTASSIUM CHLORIDE, CALCIUM CHLORIDE 600; 310; 30; 20 MG/100ML; MG/100ML; MG/100ML; MG/100ML
75 INJECTION, SOLUTION INTRAVENOUS CONTINUOUS
Status: DISCONTINUED | OUTPATIENT
Start: 2024-11-07 | End: 2024-11-08

## 2024-11-07 RX ORDER — PANTOPRAZOLE SODIUM 40 MG/10ML
40 INJECTION, POWDER, LYOPHILIZED, FOR SOLUTION INTRAVENOUS
Status: DISCONTINUED | OUTPATIENT
Start: 2024-11-08 | End: 2024-11-07

## 2024-11-07 RX ORDER — POLYETHYLENE GLYCOL 3350 17 G/17G
17 POWDER, FOR SOLUTION ORAL 2 TIMES DAILY
Status: DISPENSED | OUTPATIENT
Start: 2024-11-07

## 2024-11-07 RX ORDER — SODIUM CHLORIDE, SODIUM LACTATE, POTASSIUM CHLORIDE, CALCIUM CHLORIDE 600; 310; 30; 20 MG/100ML; MG/100ML; MG/100ML; MG/100ML
100 INJECTION, SOLUTION INTRAVENOUS CONTINUOUS
Status: ACTIVE | OUTPATIENT
Start: 2024-11-07 | End: 2024-11-07

## 2024-11-07 ASSESSMENT — PAIN - FUNCTIONAL ASSESSMENT
PAIN_FUNCTIONAL_ASSESSMENT: CPOT (CRITICAL CARE PAIN OBSERVATION TOOL)

## 2024-11-07 NOTE — NURSING NOTE
APRN placed Coude catheter with immediate return of >350mL of urine tinged with blood; clearing as it drained. Patient tolerated well.

## 2024-11-07 NOTE — CARE PLAN
The patient's goals for the shift include      The clinical goals for the shift include Pt will be weaned off pressors by end of shift at 1900.    Over the shift, the patient did not make progress toward the following goals. Barriers to progression include acuteness of illness. Recommendations to address these barriers include continue POC.

## 2024-11-07 NOTE — PROGRESS NOTES
Nutrition Initial Assessment:   Nutrition Assessment         Nutrition Note:  Maximo Pablo is a 59 y.o. male presenting 11/6 with hypothermia (89F). Work up revealing hypovolemic shock which was improved with fluids and levophed (off levo 11/16).  Scan revealing moderate stool throughout colon and small ventral hernia. Staff attempted swallow evaluation 11/6 however pt held food in mouth; pt presently NPO; NGT placed for medications as well as to start EN. ST attempted evaluation, 11/7 however pt would not fully participate.     Past Medical History  10/24-10/25/2024 [facility] with serocoral colitis  9/9-9/14/2024 [facility] with constipation  8/12-8/16/2024 [facility] r/o intestinal obstruction and +UA.  [facility] ED 8/8/2024 due to r/o SBO; pt with +BM in ED and sent back to group home.    has a past medical history of Epilepsy, unspecified, not intractable, without status epilepticus, Hyperlipidemia, unspecified, Personal history of other diseases of the circulatory system, Personal history of other diseases of the nervous system and sense organs, Personal history of other endocrine, nutritional and metabolic disease, and Personal history of other specified conditions. dysphagia  Surgical History   has a past surgical history that includes Other surgical history (11/26/2019).       Nutrition History:  Food and Nutrient History: 11/7: Pt from group home; pt nonverbal at baseline. Called facility and spoke with caregiver (Aminah, [phone]); pt there on regular/puree level 4/moderately thick (HONEY) liquids all via spoon, and double portions for dinner . Pt usually able to feed self with right hand utlilizing adaptive equipment including a built up utensil. Diet there supplemented with [place] Instant Breakfast 3x/day. Per Aminah, pt usually eats well however pt with poor appetite ~1 week with reported no lunch/dinner 11/5 and minimal breakfast 11/6. NKFA. Note meds crushed in pudding or yogurt per  "archives.  Vitamin/Herbal Supplement Use: home meds include dulcolax, D3, MVI, miralax, metamucil, senna, fleets enema.  Food Allergies/Intolerances:  None  GI Symptoms: Constipation  Oral Problems: Chewing difficulty and Swallowing difficultybaseline food/fluids as puree level 4 with moderately thick (HONEY) liquids.        Anthropometrics:  Height: 162.6 cm (5' 4\")   Weight: 75 kg (165 lb 5.5 oz)   BMI (Calculated): 28.37   IBW: 59.1kg          Weight History:   Group home weights as follows:  11/2024: 72.6kg  10/2024: 73.5kg  8/2024: 74.5kg  7/2024: 77.2kg  Weight Change %: no significant weight change per group home record.    Critical-Care Pain Observation Score:  [0]      Nutrition Focused Physical Exam Findings:  11/7/2024  Subcutaneous Fat Loss:   Orbital Fat Pads: Well nourished (slightly bulging fat pads)  Buccal Fat Pads: Well nourished (full, rounded cheeks)  Triceps: Defer  Ribs: Defer  Muscle Wasting:  Temporalis: Well nourished (well-defined muscle)  Pectoralis (Clavicular Region): Well nourished (clavicle not visible)  Deltoid/Trapezius: Well nourished (rounded appearance at arm, shoulder, neck)  Interosseous: Well nourished (muscle bulges)  Trapezius/Infraspinatus/Supraspinatus (Scapular Region): Defer  Quadriceps: Defer (contracted)  Gastrocnemius: Defer (contracted)  Edema:  Edema: none  Physical Findings:  Hair: Negative  Eyes:  (closed)  Skin: Negative    Nutrition Significant Labs:  BMP Trend:   Results from last 7 days   Lab Units 11/07/24  0418 11/06/24  1842 11/06/24  1021 11/04/24  0625   GLUCOSE mg/dL 99 160* 79 80   CALCIUM mg/dL 9.7 9.9 11.1* 11.0*   SODIUM mmol/L 135* 133* 137 135*   POTASSIUM mmol/L 4.1 4.4 4.3 4.8   CO2 mmol/L 31 29 36* 34*   CHLORIDE mmol/L 98 97* 97* 97*   BUN mg/dL 7 8 10 9   CREATININE mg/dL 1.04 1.10 1.19 1.19    , A1C:  Lab Results   Component Value Date    HGBA1C 5.6 09/19/2024   , BG POCT trend:   Results from last 7 days   Lab Units 11/07/24  0852 " "11/07/24  0355 11/07/24  0042 11/07/24  0023 11/07/24  0021   POCT GLUCOSE mg/dL 88 119* 210* 66* 67*    , Liver Function Trend:   Results from last 7 days   Lab Units 11/06/24  1842 11/06/24  1021   ALK PHOS U/L 219* 258*   AST U/L 41* 56*   ALT U/L 32 39   BILIRUBIN TOTAL mg/dL 0.3 0.2    , Vit D:   Lab Results   Component Value Date    VITD25 63 09/19/2024    , Vit B12: No results found for: \"[id number]\" , Folate: No results found for: \"FOLATE\"     Nutrition Specific Medications:  Scheduled medications  [Held by provider] amLODIPine, 10 mg, oral, Daily  aspirin, 81 mg, nasogastric tube, Daily  chlorhexidine, 15 mL, Mouth/Throat, BID  cholecalciferol, 1,000 Units, nasogastric tube, Daily  [Held by provider] cloNIDine, 0.2 mg, oral, BID  enoxaparin, 40 mg, subcutaneous, q24h  insulin lispro, 0-5 Units, subcutaneous, q4h  [Held by provider] labetalol, 100 mg, oral, BID  levETIRAcetam, 500 mg, nasogastric tube, BID  [Held by provider] lisinopril, 20 mg, oral, BID  OXcarbazepine, 150 mg, nasogastric tube, BID  OXcarbazepine, 300 mg, nasogastric tube, BID  [START ON 11/8/2024] pantoprazole, 40 mg, intravenous, Daily before breakfast  polyethylene glycol, 17 g, nasogastric tube, BID  psyllium, 1 packet, nasogastric tube, Daily  thiamine, 100 mg, nasogastric tube, Daily      Continuous medications  lactated Ringer's, 75 mL/hr, Last Rate: 75 mL/hr (11/07/24 1108)      PRN medications  PRN medications: acetaminophen **OR** acetaminophen **OR** acetaminophen, bisacodyl, dextrose, dextrose, glucagon, glucagon, ipratropium-albuteroL, magnesium sulfate, magnesium sulfate, oxygen, potassium chloride CR **OR** potassium chloride, potassium chloride CR **OR** potassium chloride, potassium chloride, sodium phosphates     I/O:   Last BM Date: 11/07/24; Stool Appearance: Soft (11/07/24 0800)    Dietary Orders (From admission, onward)       Start     Ordered    11/06/24 2032  May Not Participate in Room Service  ( ROOM SERVICE MAY " NOT PARTICIPATE)  Once        Question:  .  Answer:  Yes    11/06/24 2031 11/06/24 1617  NPO Diet; Effective now  Diet effective now         11/06/24 1627                     Estimated Needs:   Total Energy Estimated Needs (kCal):  (1900-2200kcal (25-29kcal/kg of 75kg))     Total Protein Estimated Needs (g):  (83-100g (1.1-1.3g/kg of 75kg))     Total Fluid Estimated Needs (mL):  (1mL/kcal/d or as per physician)           Nutrition Diagnosis   Malnutrition Diagnosis  Patient has Malnutrition Diagnosis: No    Nutrition Diagnosis  Patient has Nutrition Diagnosis: Yes  Diagnosis Status (1): New  Nutrition Diagnosis 1: Increased nutrient needs  Related to (1): acute metabolic stress  As Evidenced by (1): oral intakes <75% of meals ~5-7 days with resulting hypovolemic shock.  Additional Assessment Information (1): 11/7: Case discussed during CCM rounds. NGT in place for meds and plan for ST eval today.  Additional Nutrition Diagnosis: Diagnosis 2  Diagnosis Status (2): New  Nutrition Diagnosis 2: Altered GI function  Related to (2): recurrent constipation  As Evidenced by (2): + constipation this admit as well as 3 other admissions for constipation since 8/2024.       Nutrition Interventions/Recommendations         Nutrition Prescription:  Individualized Nutrition Prescription Provided for : EN and/or  PO        Nutrition Interventions:   Interventions: Meals and snacks, Enteral intake  Meals and Snacks: General healthful diet  Goal: regular diet with food/fluid textures as per ST interventions  Enteral Intake: Other (Comment) (initiate enteral nutrition via NGT)  Goal: Suggest tube feeds via NGT with Jevity 1.5 starting at 20mL/hr and increase by 10mL every 6-8 hours until goal of 60mL/hr is reached for total regimen of 2160kcal, 92g pro, and 1094mL formula free water or 29kcal/kg and 1.2g pro/kg.  Adjust water flush pending fluid needs--suggesting start with 180mL water flush 6x/day to provide total water of 2174mL  (formula + flush).    Collaboration and Referral of Nutrition Care: Collaboration by nutrition professional with other providers, Team meeting involving nutrition professional  Goal: ST Pamela Watters, and CCM team during pt care rounds    Nutrition Education:   11/7: Pt not appropriate for education; from group home.        Nutrition Monitoring and Evaluation   Food/Nutrient Related History Monitoring  Monitoring and Evaluation Plan: Energy intake  Energy Intake: Estimated energy intake  Criteria: EN and/or PO to meet >75% of estimated nutrient needs    Body Composition/Growth/Weight History  Monitoring and Evaluation Plan: Weight  Weight: Measured weight  Criteria: daily weight; maintain stable weight    Biochemical Data, Medical Tests and Procedures  Monitoring and Evaluation Plan: Electrolyte/renal panel  Electrolyte and Renal Panel: Magnesium, Phosphorus, Potassium, Sodium  Criteria: lytes WNR              Time Spent (min): 75 minutes

## 2024-11-07 NOTE — PROGRESS NOTES
Critical Care Daily Progress        Subjective      Patient is a 59 y.o. male admitted on 11/6/2024  9:15 AM with the following indication(s) for ICU care: shock requiring pressor support    Interval History: Patient is a 59-year-old male who presented to [facility] ED from his group home with concern for hypothermia. Poor oral intake for the past week. Constipation. Temperature at facility 96 --> 95, thus was brought in. While in the ED, temperature 89. Dorie hugger applied, and patient became hypotensive. Did not respond to fluids, thus was started on levophed. Transferred up to ICU due to pressor support requirement. Core temperature improved. Has been weaned off pressor support since 11/6 at 1815. Poor urine output. NG tube placed due to poor feeding and required oral meds.     Overnight Events: Levophed weaned off. Did pull out his NG tube yesterday, was replaced without issue. Low urine output, bladder scan revealed >395mL. Caude catheter placed after a few hours of no output. Initially blood tinged urine with a few small clots, but now clear to yellow.     Complaints: Nonverbal, appears to have increased work of breathing this morning, but improvement on 6L NC.     Scheduled Medications:   [Held by provider] amLODIPine, 10 mg, oral, Daily  aspirin, 81 mg, nasogastric tube, Daily  chlorhexidine, 15 mL, Mouth/Throat, BID  cholecalciferol, 1,000 Units, nasogastric tube, Daily  [Held by provider] cloNIDine, 0.2 mg, oral, BID  enoxaparin, 40 mg, subcutaneous, q24h  insulin lispro, 0-5 Units, subcutaneous, q4h  [Held by provider] labetalol, 100 mg, oral, BID  levETIRAcetam, 500 mg, nasogastric tube, BID  [Held by provider] lisinopril, 20 mg, oral, BID  OXcarbazepine, 150 mg, nasogastric tube, BID  OXcarbazepine, 300 mg, nasogastric tube, BID  [START ON 11/8/2024] pantoprazole, 40 mg, intravenous, Daily before breakfast  polyethylene glycol, 17 g, nasogastric tube, BID  psyllium, 1 packet, nasogastric tube,  Daily  thiamine, 100 mg, nasogastric tube, Daily         Continuous Medications:   lactated Ringer's, 75 mL/hr, Last Rate: 75 mL/hr (11/07/24 1108)         PRN Medications:   PRN medications: acetaminophen **OR** acetaminophen **OR** acetaminophen, bisacodyl, dextrose, dextrose, glucagon, glucagon, ipratropium-albuteroL, magnesium sulfate, magnesium sulfate, oxygen, potassium chloride CR **OR** potassium chloride, potassium chloride CR **OR** potassium chloride, potassium chloride, sodium phosphates    Objective       Vitals:  Most Recent:  Vitals:    11/07/24 1045   BP:    Pulse: 88   Resp: 20   Temp:    SpO2: 97%       Physical Exam:   Physical Exam   General: Not in acute distress, nonverbal at baseline, alert, does not follow commands  HEENT: Normocephalic, atraumatic, EOMI, moist mucous membranes, some white discharge noted from eyes bilaterally, no conjunctivitis  Neck: Neck supple, trachea midline, no evidence of trauma  Cardiovascular: RRR, S1 and S2 appreciated, no murmurs rubs gallops appreciated  Respiratory: Diminished breath sounds noted bilaterally (R worse than L) with some intermittent wheezing, both inspiratory and expiratory, increased work of breathing noted, SpO2 91% at time of exam  GI: Abdomen soft, nondistended, nontender to palpation.  Ventral hernia palpated the periumbilical region, nontender and easily reducible.  Extremities: No edema appreciated in lower extremities bilaterally, no cyanosis; contraction of left upper extremity  Neuro: A&O x3, no focal deficits, strength and sensation intact bilaterally  Skin: No longer cool to the touch, dry.  No signs of frostbite.  Linear abrasion noted on the right upper extremity without any surrounding erythema or purulence.  Minor excoriation in the groin bilaterally.  Otherwise no skin changes noted.    24hr Min/Max:  Temp  Min: 31.6 °C (88.9 °F)  Max: 36.4 °C (97.5 °F)  Pulse  Min: 40  Max: 98  BP  Min: 67/39  Max: 159/86  Resp  Min: 8  Max:  29  SpO2  Min: 80 %  Max: 100 %    LDA:  Urethral Catheter Coude (Active)   Placement Date/Time: 11/07/24 0400   Placed by: Yesika CABA  Hand Hygiene Completed: Yes  Catheter Type: Coude  Catheter Balloon Size: 10 mL  Urine Returned: Yes   Number of days: 0       NG/OG/Feeding Tube Gastric Left nostril (Active)   Placement Date/Time: 11/06/24 1730   Placed by: Randy Condon RN  Hand Hygiene Completed: Yes  Type of Tube: NG/OG Tube  Tube Length: 56 cm  Tube Type: Gastric  Tube Location: Left nostril   Number of days: 0         Vent settings: None       Hemodynamic parameters for last 24 hours: Has been weaned off pressors       I/O:    Intake/Output Summary (Last 24 hours) at 11/7/2024 1124  Last data filed at 11/7/2024 1100  Gross per 24 hour   Intake 1450 ml   Output 138 ml   Net 1312 ml         Lab/Radiology/Diagnostic Review:  Results for orders placed or performed during the hospital encounter of 11/06/24 (from the past 24 hours)   ECG 12 lead   Result Value Ref Range    Ventricular Rate 48 BPM    Atrial Rate 48 BPM    CT Interval 282 ms    QRS Duration 86 ms    QT Interval 438 ms    QTC Calculation(Bazett) 391 ms    R Axis 138 degrees    T Axis 80 degrees    QRS Count 8 beats    Q Onset 218 ms    P Onset 77 ms    P Offset 138 ms    T Offset 437 ms    QTC Fredericia 406 ms   Troponin, High Sensitivity, 1 Hour   Result Value Ref Range    Troponin I, High Sensitivity 16 0 - 20 ng/L   Amylase   Result Value Ref Range    Amylase 60 29 - 103 U/L   Comprehensive metabolic panel   Result Value Ref Range    Glucose 160 (H) 74 - 99 mg/dL    Sodium 133 (L) 136 - 145 mmol/L    Potassium 4.4 3.5 - 5.3 mmol/L    Chloride 97 (L) 98 - 107 mmol/L    Bicarbonate 29 21 - 32 mmol/L    Anion Gap 11 10 - 20 mmol/L    Urea Nitrogen 8 6 - 23 mg/dL    Creatinine 1.10 0.50 - 1.30 mg/dL    eGFR 78 >60 mL/min/1.73m*2    Calcium 9.9 8.6 - 10.3 mg/dL    Albumin 3.6 3.4 - 5.0 g/dL    Alkaline Phosphatase 219 (H) 33 - 120 U/L     Total Protein 7.1 6.4 - 8.2 g/dL    AST 41 (H) 9 - 39 U/L    Bilirubin, Total 0.3 0.0 - 1.2 mg/dL    ALT 32 10 - 52 U/L   CBC and Auto Differential   Result Value Ref Range    WBC 3.6 (L) 4.4 - 11.3 x10*3/uL    nRBC 0.5 (H) 0.0 - 0.0 /100 WBCs    RBC 4.79 4.50 - 5.90 x10*6/uL    Hemoglobin 12.6 (L) 13.5 - 17.5 g/dL    Hematocrit 38.1 (L) 41.0 - 52.0 %    MCV 80 80 - 100 fL    MCH 26.3 26.0 - 34.0 pg    MCHC 33.1 32.0 - 36.0 g/dL    RDW 15.4 (H) 11.5 - 14.5 %    Platelets 77 (L) 150 - 450 x10*3/uL    Neutrophils % 75.5 40.0 - 80.0 %    Immature Granulocytes %, Automated 0.3 0.0 - 0.9 %    Lymphocytes % 17.6 13.0 - 44.0 %    Monocytes % 5.2 2.0 - 10.0 %    Eosinophils % 1.1 0.0 - 6.0 %    Basophils % 0.3 0.0 - 2.0 %    Neutrophils Absolute 2.75 1.20 - 7.70 x10*3/uL    Immature Granulocytes Absolute, Automated 0.01 0.00 - 0.70 x10*3/uL    Lymphocytes Absolute 0.64 (L) 1.20 - 4.80 x10*3/uL    Monocytes Absolute 0.19 0.10 - 1.00 x10*3/uL    Eosinophils Absolute 0.04 0.00 - 0.70 x10*3/uL    Basophils Absolute 0.01 0.00 - 0.10 x10*3/uL   Procalcitonin   Result Value Ref Range    Procalcitonin 0.02 <=0.07 ng/mL   Levetiracetam   Result Value Ref Range    Keppra 22 10 - 40 ug/mL   POCT GLUCOSE   Result Value Ref Range    POCT Glucose 164 (H) 74 - 99 mg/dL   POCT GLUCOSE   Result Value Ref Range    POCT Glucose 84 74 - 99 mg/dL   POCT GLUCOSE   Result Value Ref Range    POCT Glucose 67 (L) 74 - 99 mg/dL   POCT GLUCOSE   Result Value Ref Range    POCT Glucose 66 (L) 74 - 99 mg/dL   POCT GLUCOSE   Result Value Ref Range    POCT Glucose 210 (H) 74 - 99 mg/dL   POCT GLUCOSE   Result Value Ref Range    POCT Glucose 119 (H) 74 - 99 mg/dL   Renal Function Panel   Result Value Ref Range    Glucose 99 74 - 99 mg/dL    Sodium 135 (L) 136 - 145 mmol/L    Potassium 4.1 3.5 - 5.3 mmol/L    Chloride 98 98 - 107 mmol/L    Bicarbonate 31 21 - 32 mmol/L    Anion Gap 10 10 - 20 mmol/L    Urea Nitrogen 7 6 - 23 mg/dL    Creatinine 1.04 0.50 -  1.30 mg/dL    eGFR 84 >60 mL/min/1.73m*2    Calcium 9.7 8.6 - 10.3 mg/dL    Phosphorus 2.9 2.5 - 4.9 mg/dL    Albumin 3.3 (L) 3.4 - 5.0 g/dL   Magnesium   Result Value Ref Range    Magnesium 1.60 1.60 - 2.40 mg/dL   CBC   Result Value Ref Range    WBC 4.7 4.4 - 11.3 x10*3/uL    nRBC 0.4 (H) 0.0 - 0.0 /100 WBCs    RBC 4.64 4.50 - 5.90 x10*6/uL    Hemoglobin 12.2 (L) 13.5 - 17.5 g/dL    Hematocrit 37.2 (L) 41.0 - 52.0 %    MCV 80 80 - 100 fL    MCH 26.3 26.0 - 34.0 pg    MCHC 32.8 32.0 - 36.0 g/dL    RDW 15.6 (H) 11.5 - 14.5 %    Platelets 75 (L) 150 - 450 x10*3/uL   Urinalysis with Reflex Microscopic   Result Value Ref Range    Color, Urine Yellow Light-Yellow, Yellow, Dark-Yellow    Appearance, Urine Clear Clear    Specific Gravity, Urine >1.050 (N) 1.005 - 1.035    pH, Urine 6.0 5.0, 5.5, 6.0, 6.5, 7.0, 7.5, 8.0    Protein, Urine 30 (1+) (A) NEGATIVE, 10 (TRACE), 20 (TRACE) mg/dL    Glucose, Urine Normal Normal mg/dL    Blood, Urine OVER (3+) (A) NEGATIVE    Ketones, Urine TRACE (A) NEGATIVE mg/dL    Bilirubin, Urine NEGATIVE NEGATIVE    Urobilinogen, Urine Normal Normal mg/dL    Nitrite, Urine NEGATIVE NEGATIVE    Leukocyte Esterase, Urine NEGATIVE NEGATIVE   Microscopic Only, Urine   Result Value Ref Range    WBC, Urine 1-5 1-5, NONE /HPF    RBC, Urine >20 (A) NONE, 1-2, 3-5 /HPF   POCT GLUCOSE   Result Value Ref Range    POCT Glucose 88 74 - 99 mg/dL   Vancomycin   Result Value Ref Range    Vancomycin 24.4 (H) 5.0 - 20.0 ug/mL     ECG 12 lead    Result Date: 11/7/2024  Marked sinus bradycardia with 1st degree AV block Right axis deviation Nonspecific T wave abnormality Abnormal ECG When compared with ECG of 12-AUG-2024 05:53, OR interval has increased Vent. rate has decreased BY  82 BPM QRS axis Shifted right Minimal criteria for Anterior infarct are no longer Present Non-specific change in ST segment in Lateral leads    XR chest 1 view    Result Date: 11/7/2024  Interpreted By:  Marvin Tejada, STUDY: XR CHEST 1  VIEW;  11/7/2024 8:23 am   INDICATION: Signs/Symptoms:Decreased breath sounds on left, reduced SpO2, concern for aspiration event.   COMPARISON: 11/06/2024   ACCESSION NUMBER(S): [id number]   ORDERING CLINICIAN: HARRY CARRILLO   FINDINGS: The patient is kyphotic and rotated which can limit evaluation. The mandible partially obscures the lung apices. Nasogastric tube terminates in the stomach.   Small lung volumes. No definite focal infiltrates or pleural effusion. Cardiomediastinal silhouette unchanged. No pulmonary vascular congestion.       No definite active disease in the chest identified on hypoventilatory exam.   MACRO: None   Signed by: Marvin Tejada 11/7/2024 10:03 AM Dictation workstation:   [id number]    XR abdomen 1 view    Result Date: 11/6/2024  Interpreted By:  Harry Chow, STUDY: XR ABDOMEN 1 VIEW;  11/6/2024 7:29 pm   INDICATION: Signs/Symptoms:NG tube placement confirmation.     COMPARISON: None.   ACCESSION NUMBER(S): [id number]   ORDERING CLINICIAN: HARRY CARRILLO   FINDINGS: There is a nasogastric tube noted ending in the left upper quadrant satisfactory position.       1.  There is a nasogastric tube noted ending in the left upper quadrant satisfactory position.   MACRO: None   Signed by: Harry Chow 11/6/2024 7:37 PM Dictation workstation:   [id number]    CT abdomen pelvis w IV contrast    Result Date: 11/6/2024  Interpreted By:  Odell Lopez, STUDY: CT ABDOMEN PELVIS W IV CONTRAST; 11/6/2024 2:31 pm   INDICATION: Signs/Symptoms:constipation, hypothermia.   COMPARISON: 10/30/2024   ACCESSION NUMBER(S): [id number]   ORDERING CLINICIAN: ALVARO GARCIA   TECHNIQUE: Contiguous axial images were obtained at 3mm slice thickness through the abdomen and pelvis following intravenous contrast administration. Coronal and sagittal reconstructions at 3 mm slice thickness were performed. 75 ML of Omnipaque 350 was administered.   FINDINGS: LOWER CHEST: Evaluation of the visualized  lung bases demonstrates right basilar airspace consolidation, may represent atelectasis and/or pneumonia. The heart is within normal limits for size.   ABDOMEN:   LIVER: The liver is within normal limits for appearance, without evidence of focal masses.   BILE DUCTS: No definite intra or extrahepatic biliary dilatation is identified.   GALLBLADDER: The gallbladder is nondilated. No definite calcified gallstones are seen.   PANCREAS: The pancreas is within normal limits for appearance, without evidence of focal masses.   SPLEEN: The spleen is within normal limits for size. No focal splenic mass is seen.   ADRENAL GLANDS: No definite adrenal nodules or masses are seen bilaterally.   KIDNEYS AND URETERS: There is no hydronephrosis, hydroureter or renal/ ureteral calculus identified bilaterally. No definite focal renal mass is seen, though evaluation is severely limited by the lack of intravenous contrast.   PELVIS:   BLADDER: The urinary bladder is grossly unremarkable for CT appearance.   REPRODUCTIVE ORGANS: The prostate is within normal limits for appearance.   BOWEL: A moderate amount of residual stool is seen throughout the colon. The colon and small bowel are within normal limits for course, caliber and appearance, without evidence of wall thickening or obstruction. The appendix is decompressed. No CT evidence of acute diverticulitis or appendicitis is seen.   VESSELS: The abdominal aorta is within normal limits for course, caliber and appearance, without evidence of aneurysm.   PERITONEUM/RETROPERITONEUM/LYMPH NODES: There is no free intraperitoneal air or free fluid identified. No gross mesenteric or retroperitoneal lymphadenopathy is identified.   BONE AND SOFT TISSUE: There is no evidence of acute fracture identified. No evidence of abdominal wall mass or hernia is identified.       1. Moderate residual stool throughout the colon, as above. 2. No evidence of bowel obstruction, free intraperitoneal air or  abnormal intra-abdominal fluid collection.   MACRO: None   Signed by: Odell Lopez 11/6/2024 2:49 PM Dictation workstation:   [id number]    XR chest 1 view    Result Date: 11/6/2024  Interpreted By:  Harry Stover, STUDY: XR CHEST 1 VIEW;  11/6/2024 11:35 am   INDICATION: Signs/Symptoms:hypothermia.     COMPARISON: 08/12/2024.   ACCESSION NUMBER(S): [id number]   ORDERING CLINICIAN: ALVARO GARCIA   FINDINGS: CARDIOMEDIASTINAL SILHOUETTE: Cardiac silhouette is stable and not significantly enlarged.   LUNGS: Inspiratory volume is low with mild bibasilar atelectasis. No definite pleural effusion. No pneumothorax is seen.   ABDOMEN: Mild gaseous distention of upper abdominal bowel is noted.   BONES: Thoracolumbar mild levocurvature may be exaggerated by positioning.       1.  Low inspiratory volume with mild bibasilar atelectasis.       MACRO: None.   Signed by: Harry Stover 11/6/2024 12:02 PM Dictation workstation:   [id number]             Assessment/Plan   Principal Problem:    Hypothermia, initial encounter      ASSESSMENT   Patient is a 59-year-old male who presented to [facility] ED with primary complaint of hypothermia.  Patient was treated with rewarming and subsequently developed hypotension.  He was given a total of 2 L lactated Ringer's and was started on norepinephrine.  Concern for septic shock given his hypothermia and leukopenia with hypotension necessitating pressor support.  Infectious workup initiated.  Patient will be admitted to the ICU due to hemodynamic monitoring and norepinephrine drip.     PLAN       Neuro:   #Hypothermia  #Epilepsy  #MRDD  #Cerebral palsy  -Patient presented to the ED hypothermic, initial temperature recorded 31.7 °C.  Patient was responsive to external warming blankets.  Temperature did improve to greater than 34 °C, thus blankets discontinued.  Most recent temperature recorded upon presentation to the ICU was 36 °C. Temperature continues to remain in appropriate range.    -History: epilepsy, cerebral palsy, MRDD, nonverbal at baseline  -Home meds: Keppra, Trileptal  -Pain: cannot be assessed secondary to patient's nonverbal status  -Seizure precautions  -Continue to monitor temperature  -Keppra level within normal limits, Trileptal level pending  -[facility] ICU    Cardiac:   #Shock - distributive vs hypovolemic vs septic  #Hypotension  #First degree AV block  -Initially on presentation to the ED, patient's blood pressure was normotensive at 126/80.  However once rewarming therapy was started his blood pressure did drop, lowest charted 73/40.  He was not responsive to 1 L of fluids thus was started on Levophed.  -Levophed weaned off on 11/6 at 1815; likely shock was secondary to vasodilatory effect of rewarming therapy vs hypovolemia  -Initial ECG revealed bradycardia (48), sinus rhythm.  First-degree AV block with SC interval 282.   -History: HTN  -Home meds: amlodipine (H), aspirin, clonidine (H), labetalol (H), lisinopril (H)  -Continuous cardiac monitoring  -Monitor hemodynamics  -Titrate IV pressors, was on norepinephrine  -Maintain Goal MAP > 65  -Monitor and optimize electrolytes, keep K > 4.0, Mag > 2.0    Pulmonary:   -Initally no active issues, patient was saturating well on 2L NC and appeared to be breathing comfortably. However, he did desat down to high 80s on 11/7 with increased work of breathing. Diminished breath sounds heard, worse on R than L. Repeat imaging was not concerning for aspiration pneumonia or acute process. Respiratory status improved.   -History: history of prior aspiration pneumonia  -Imaging:   --CXR 11/6 showed low inspiratory volume with mild bibasilar atelectasis  --CXR 11/7 showed no acute cardiopulmonary process  -Continuous pulse oximetry   -O2 PRN to maintain SpO2 > 94%  -DuoNebs q4 hours prn wheezing        Gastrointestinal:   #Constipation  #Transaminatis  #Elevated lipase  #Ventral hernia  -Patient with chronic constipation, responsive to  suppositories and enema  -CT A/P showed residual stool throughout colon without evidence of bowel obstruction, free intraperitoneal air or abnormal intra-abdominal fluid collection  -Elevated alkaline phosphataseon initial labs. AST elevated, ALT within normal limits. Elevated lipase, but amylase within normal limits. Initial concern for possible pancreatitis but exam benign.   -Periumbilical ventral hernia noted on exam, nontender to palpation.  Reducible on exam.  No evidence of strangulation on imaging.  -NG tube placed for feeding and medications, nutrition consulted for enteral feeds  -Thiamine 100mg daily due to concern for refeeding given poor oral intake over the past week  -History: Chronic constipation, history of dysphagia, history of small bowel obstruction (August 2024)  -Home meds: bisacodyl suppository, omeprazole, miralax, metamucil, Katty-Colace, Fleet enema  -Diet: NPO, will start enteral feeding  -Prophylaxis: Protonix (home med)  -Bowel regimen: Miralax, dulcolax suppository prn, fleet enema prn  -Last BM Date: 11/07/24    Renal:   #CKD stage II  #Oliguria  -Swain placed 11/6 for accurate core temperature management, strict I&Os  -Per ED documentation, attempt was made at swain irrigation but no drainage. Swain removed and straight cath attempted but unable to be advanced so external catheter was placed. Blood clot was noted.   -Overnight on 11/6, Caude catheter placed due to acute urinary retention. Initially output of blood tinged urine, but did clear up to pale yellow.   -Continue to monitor urine output, bladder scan as needed  -Daily RFP,Mg,Phos  -History: CKD stage II  -S/p 2.5L lactated ringers, continue maintenance fluids at 75 mL/hr until tube feeds initiated  -Electrolytes: Replete per protocol, goal K>4 Phos >3 Mg >2  Net IO Since Admission: 1,312 mL [11/07/24 1124]  Results from last 72 hours   Lab Units 11/07/24  0418 11/06/24  1842   BUN mg/dL 7 8   CREATININE mg/dL 1.04 1.10          Endocrine:   -No active acute issues at this time  -History: none  -Monitor blood glucose q4 hours, continue Novolog SSC for glycemic control  -Goal BS < 180  -Follow hypoglycemic protocol  -No history of diabetes mellitus, most recent hemoglobin A1C was 5.6 in September 2024  -TSH 3.31, no concern for myxedema coma  -Cortisol level pending, likely not related to adrenal insufficiency given improvement without steroids  Results from last 7 days   Lab Units 11/07/24  0852 11/07/24  0418 11/07/24  0355 11/07/24  0042 11/07/24  0023 11/07/24  0021 11/06/24  2121 11/06/24  1849 11/06/24  1842   POCT GLUCOSE mg/dL 88  --  119* 210* 66* 67* 84   < >  --    GLUCOSE mg/dL  --  99  --   --   --   --   --   --  160*    < > = values in this interval not displayed.        Hematology:   #Thrombocytopenia  -Patient presents with thrombocytopenia (85), no clear etiology identified --> 75 on 11/7  -Ordered peripheral smear for further evaluation  -Daily CBC  -History: none  - DVT Prophylaxis: Lovenox, SCDs  Results from last 7 days   Lab Units 11/07/24 0418 11/06/24  1842   HEMOGLOBIN g/dL 12.2* 12.6*   HEMATOCRIT % 37.2* 38.1*   PLATELETS AUTO x10*3/uL 75* 77*       Infectious Disease:   #Concern for septic shock   #Leukopenia  -Patient presented hypothermic and leukopenic, triggering concern for possible sepsis. No clear infectious etiology at this time - UA pending, CXR shows no signs of pneumonia, no active signs of intraabdominal infection on imaging at this time  -Leukopenia improved on 11/7, remains afebrile  -History: prior history of aspiration pneumonia  -Abx: zosyn (start date 11/6), vancomycin (start date 11/6) - antibiotics discontinued 11/7 due to low concern for infectious etiology  -Cultures:   --Blood cultures x2 collected   --UA collected, clear yellow urine with 1+ protein, 3+ blood, trace ketones, no leukocyte esterase or nitrites  Results from last 7 days   Lab Units 11/07/24 0418 11/06/24  1842   WBC  AUTO x10*3/uL 4.7 3.6*     Temp (24hrs), Av.4 °C (92.1 °F), Min:31.6 °C (88.9 °F), Max:36.4 °C (97.5 °F)       Musculoskeletal:   -No acute active issues  -History: cerebral palsy  -PT/OT to evaluate    Integumentary:   -No acute active issues  -Management per ICU protocol  -History: atopic dermatitis  -Home meds: Dupixent    Lines/Tubes/Drains:   PIVs, NG tube (), Coude catheter ()      Code status: Full Code     Dispo: Patient is stable for downgrade      ICU to [facility] Transfer Summary     I:  ICU Admission Reason & Brief ICU Course:    Patient is a 59-year-old male who presented to [facility] ED with primary complaint of hypothermia.  Patient was treated with rewarming and subsequently developed hypotension.  He was given a total of 2 L lactated Ringer's and was started on norepinephrine.  Concern for septic shock given his hypothermia and leukopenia with hypotension necessitating pressor support.  Infectious workup initiated.  Patient will be admitted to the ICU due to hemodynamic monitoring and norepinephrine drip. Levophed weaned off 1815, remains stable. Low urine output, caude catheter placed.       C: Code Status/DPOA Info/Goals of Care/ACP Note    Full Code    U: Unprescribing & Pertinent High-Risk Medications    Changes to home meds: None     Anticoagulation: Lovenox, SCDs    Antibiotics:   Initially given Vancomycin & Zosyn (start date ) - discontinued  due to low suspicion for infectious etiology    P: Pending Tests at the Time of Transfer   Peripheral smear  Free cortisol level      A: Active consultants, including Rehab:   []  Subspecialty Consultants: None at this time  [x]  PT  [x]  OT  []  SLP  []  Wound Care    Nutrition consulted    U: Uncertainty Measure/Diagnostic Pause:    Working diagnosis at the time of transfer: hypothermia, shock likely distributive vs hypovolemia      S: Summary of Major Problems and To-Dos:   #Hypothermia - resolved  #Shock - resolved (weaned off  levophed 11/6 1815)  #Oliguria  #Thrombocytopenia       E: Exam, including Lines/Drains/Airways & Data Review:   See above for physical exam  Difficult airway? N/A  Lines/drains assessed for removal? Yes, describe: PIVs, NG tube (placed 11/6, first NG tube was removed by patient but replaced without issue), Caude catheter (11/7)          Patient discussed with attending physician.     Krupa Cotton, DO    Internal Medicine PGY-1 Resident

## 2024-11-07 NOTE — SIGNIFICANT EVENT
ICU downgrade acceptance note    Mr Pablo is a 58yo man with cerebral palsy, epilepsy, HTN, CKD admitted initially to ICU for hypothermia and hypotension requiring presors. Patient's hypotension likely distributive in the setting of rewarming and improved with fluid resuscitation. Patient subsequently normothermic off luis angel hugger. Unclear etiology of hypothermia. There was initial concern for sepsis causing hypothermia, however infectious workup not suggestive of infection. Antibiotics discontinued. TSH WNL, low suspicion myxedema coma. AM cortisol pending, but adrenal insufficiency felt to be less likely. He is on Trileptal, which has been associated with hypothermia, but this is a chronic medication, and he has been on it for at least 5 years. Will monitor temperature curve today and tonight. If hypothermia reoccurs we may consider discussing alternatives to trileptal with neurology. If temp stable, can likely discharge tomorrow.  Patient will continue on bowel regimen for constipation seen on CT. He was assessed by speech, await recommendations in regards to diet. Previously on pureed diet and moderately/honey thick liquids by spoon during prior admission. BP still on the soft side, continue holding antihypertensives for today.

## 2024-11-07 NOTE — PROGRESS NOTES
Speech-Language Pathology                 Therapy Communication Note    Patient Name: Maximo Pablo  MRN: [id number]  Department: [facility] ICU  Room: 2126/2126-A  Today's Date: 11/7/2024     Discipline: Speech Language Pathology    Missed Visit Reason:  Lethargy    Missed Time: Attempt [id number]    Comment: Patient was seen at bedside for attempted swallow evaluation. Patient was initially alert, and was positioned upright. Oral care was completed with use of suction, and blood-tinged secretions and fresh blood noted in oral cavity (RN aware). Following oral care, patient with head down and eyes closed, not responding to stimulation or PO trial offered. Will plan to re-attempt at a later date, when alertness improves.    *Of note, patient was on a modified diet (puree and moderately/honey thick liquids BY SPOON) at baseline, consistent with MBSS completed August 2024.*

## 2024-11-07 NOTE — PROGRESS NOTES
Vancomycin Dosing by Pharmacy- Cessation of Therapy    Consult to pharmacy for vancomycin dosing has been discontinued by the prescriber, pharmacy will sign off at this time.    Please call pharmacy if there are further questions or re-enter a consult if vancomycin is resumed.     Gerri Cárdenas, SoniaD

## 2024-11-07 NOTE — SIGNIFICANT EVENT
ICU to [facility] Transfer Summary     I:  ICU Admission Reason & Brief ICU Course:    Patient is a 59-year-old male who presented to [facility] ED with primary complaint of hypothermia.  Patient was treated with rewarming and subsequently developed hypotension.  He was given a total of 2 L lactated Ringer's and was started on norepinephrine.  Concern for septic shock given his hypothermia and leukopenia with hypotension necessitating pressor support.  Infectious workup initiated.  Patient will be admitted to the ICU due to hemodynamic monitoring and norepinephrine drip. Levophed weaned off 11/6 1815, remains stable. Low urine output, caude catheter placed.       C: Code Status/DPOA Info/Goals of Care/ACP Note    Full Code      U: Unprescribing & Pertinent High-Risk Medications    Changes to home meds: None     Anticoagulation: Lovenox, SCDs     Antibiotics:   Initially given Vancomycin & Zosyn (start date 11/6) - discontinued 11/7 due to low suspicion for infectious etiology    P: Pending Tests at the Time of Transfer   Peripheral smear  Free cortisol level      A: Active consultants, including Rehab:   []  Subspecialty Consultants: none  [x]  PT  [x]  OT  []  SLP  []  Wound Care    Nutrition    U: Uncertainty Measure/Diagnostic Pause:    Working diagnosis at the time of transfer: hypothermia, shock likely distributive vs hypovolemia     S: Summary of Major Problems and To-Dos:   #Hypothermia - resolved  #Shock - resolved (weaned off levophed 11/6 1815)  #Oliguria  #Thrombocytopenia    E: Exam, including Lines/Drains/Airways & Data Review:   General: Not in acute distress, nonverbal at baseline, alert, does not follow commands  HEENT: Normocephalic, atraumatic, EOMI, moist mucous membranes  Neck: Neck supple, trachea midline, no evidence of trauma  Cardiovascular: RRR, S1 and S2 appreciated, no murmurs rubs gallops appreciated  Respiratory: Diminished breath sounds noted bilaterally (R worse than L) with some intermittent  wheezing, both inspiratory and expiratory  GI: Abdomen soft, nondistended, nontender to palpation.  Ventral hernia palpated the periumbilical region, nontender and easily reducible.  Extremities: No edema appreciated in lower extremities bilaterally, no cyanosis; contraction of left upper extremity  Neuro: A&O x3, no focal deficits, strength and sensation intact bilaterally  Skin: No longer cool to the touch, dry.  No signs of frostbite.  Linear abrasion noted on the right upper extremity without any surrounding erythema or purulence.  Minor excoriation in the groin bilaterally.  Otherwise no skin changes noted.    Difficult airway? N/A  Lines/drains assessed for removal? Yes, describe: PIVs, NG tube (placed 11/6, first NG tube was removed by patient but replaced without issue), Caude catheter (11/7)      Patient was accepted for downgrade at 11:57 on 11/7. Accepted by Dr. Michelle Blair.       Krupa Cotton DO  Internal Medicine PGY-1 Resident

## 2024-11-07 NOTE — PROGRESS NOTES
11/07/24 1049   Discharge Planning   Living Arrangements Other (Comment)   Support Systems Home care staff   Type of Residence [facility]   Care Facility Name [facility]   Home or Post Acute Services None   Expected Discharge Disposition Home   Does the patient need discharge transport arranged? Yes   RoundTrip coordination needed? Yes     Pt admitted with hypothermia. H/O MRDD and cerebral palsy. Pt arrived from [facility]. TC to Michelle, cousin and POA. Plan is for patient to return to group home on discharge. [facility] may be able to provide transportation depending on availability. TCC team will continue to follow.

## 2024-11-07 NOTE — CARE PLAN
The clinical goals for the shift include Pt will be weaned off pressors by end of shift at 1900.    Pt was given 500cc LR bolus. Weaned of pressors with adequate MAP's. NG placed to left nare per order for medication administration. Pt failed swallow eval, [facility] recommended for ST to see pt tomorrow.       Problem: Pain - Adult  Goal: Verbalizes/displays adequate comfort level or baseline comfort level  Outcome: Met     Problem: Safety - Adult  Goal: Free from fall injury  Outcome: Met     Problem: Discharge Planning  Goal: Discharge to home or other facility with appropriate resources  Outcome: Progressing     Problem: Chronic Conditions and Co-morbidities  Goal: Patient's chronic conditions and co-morbidity symptoms are monitored and maintained or improved  Outcome: Progressing     Problem: Skin  Goal: Decreased wound size/increased tissue granulation at next dressing change  Outcome: Progressing  Flowsheets (Taken 11/6/2024 1800)  Decreased wound size/increased tissue granulation at next dressing change:   Promote sleep for wound healing   Utilize specialty bed per algorithm  Goal: Participates in plan/prevention/treatment measures  Outcome: Progressing  Flowsheets (Taken 11/6/2024 1800)  Participates in plan/prevention/treatment measures: Elevate heels  Goal: Prevent/manage excess moisture  Outcome: Progressing  Flowsheets (Taken 11/6/2024 1800)  Prevent/manage excess moisture:   Cleanse incontinence/protect with barrier cream   Moisturize dry skin   Follow provider orders for dressing changes   Monitor for/manage infection if present  Goal: Prevent/minimize sheer/friction injuries  Outcome: Progressing  Flowsheets (Taken 11/6/2024 1800)  Prevent/minimize sheer/friction injuries:   Complete micro-shifts as needed if patient unable. Adjust patient position to relieve pressure points, not a full turn   Use pull sheet   HOB 30 degrees or less   Turn/reposition every 2 hours/use positioning/transfer devices   Utilize  specialty bed per algorithm  Goal: Promote/optimize nutrition  Outcome: Progressing  Flowsheets (Taken 11/6/2024 1800)  Promote/optimize nutrition: Discuss with provider if NPO > 2 days  Goal: Promote skin healing  Outcome: Progressing  Flowsheets (Taken 11/6/2024 1800)  Promote skin healing:   Assess skin/pad under line(s)/device(s)   Protective dressings over bony prominences   Turn/reposition every 2 hours/use positioning/transfer devices   Ensure correct size (line/device) and apply per  instructions   Rotate device position/do not position patient on device

## 2024-11-07 NOTE — NURSING NOTE
Patient observed with little to no urinary output since start of shift. Bladder scan revealed >395mL. Per APRN, monitor for output; if none in a couple of hours, will insert coude catheter. New order to start IVF at 100mL/hr. Patient appears to be in no distress at this time.

## 2024-11-07 NOTE — PROCEDURES
Bladder Catheterization    Date/Time: 11/7/2024 4:30 AM    Performed by: VIANEY Dela Cruz  Authorized by: VIANEY Dela Cruz    Chaperone present: yes    Prep: patient was prepped and draped in usual sterile fashion    Prep type: betadine      Procedure Details    Procedure: catheter insertion      Position: supine    Catheter insertion: indwelling      Inserted by: clinician    Catheter type: coudé      Catheter size: 18 Fr    Catheter provided by: health care organization      Complexity: complex      Complexity details: bleeding      Complexity details comment: Had bleeding from urethra from previous attempts in ED    Number of initial attempts: 1    Number of initial attempts comment: 2 attempts made in ED    Urine volume (mL): 400    Urine characteristics: blood-tinged    Urine characteristics comment: Initially blood tinged, a few small clots, then to clear yellow    Balloon inflation amount (mL): 10    Post-Procedure Details     Outcome: patient tolerated procedure well with no complications

## 2024-11-08 LAB
ALBUMIN SERPL BCP-MCNC: 3.3 G/DL (ref 3.4–5)
ANION GAP SERPL CALC-SCNC: 7 MMOL/L (ref 10–20)
BUN SERPL-MCNC: 8 MG/DL (ref 6–23)
CALCIUM SERPL-MCNC: 9.4 MG/DL (ref 8.6–10.3)
CHLORIDE SERPL-SCNC: 97 MMOL/L (ref 98–107)
CO2 SERPL-SCNC: 34 MMOL/L (ref 21–32)
CREAT SERPL-MCNC: 0.88 MG/DL (ref 0.5–1.3)
EGFRCR SERPLBLD CKD-EPI 2021: >90 ML/MIN/1.73M*2
ERYTHROCYTE [DISTWIDTH] IN BLOOD BY AUTOMATED COUNT: 15.5 % (ref 11.5–14.5)
GLUCOSE BLD MANUAL STRIP-MCNC: 102 MG/DL (ref 74–99)
GLUCOSE BLD MANUAL STRIP-MCNC: 133 MG/DL (ref 74–99)
GLUCOSE BLD MANUAL STRIP-MCNC: 145 MG/DL (ref 74–99)
GLUCOSE BLD MANUAL STRIP-MCNC: 152 MG/DL (ref 74–99)
GLUCOSE BLD MANUAL STRIP-MCNC: 99 MG/DL (ref 74–99)
GLUCOSE SERPL-MCNC: 114 MG/DL (ref 74–99)
HCT VFR BLD AUTO: 35.7 % (ref 41–52)
HGB BLD-MCNC: 11.8 G/DL (ref 13.5–17.5)
MAGNESIUM SERPL-MCNC: 2.33 MG/DL (ref 1.6–2.4)
MCH RBC QN AUTO: 26.4 PG (ref 26–34)
MCHC RBC AUTO-ENTMCNC: 33.1 G/DL (ref 32–36)
MCV RBC AUTO: 80 FL (ref 80–100)
NRBC BLD-RTO: 0.3 /100 WBCS (ref 0–0)
PHOSPHATE SERPL-MCNC: 2.5 MG/DL (ref 2.5–4.9)
PLATELET # BLD AUTO: 63 X10*3/UL (ref 150–450)
POTASSIUM SERPL-SCNC: 4.2 MMOL/L (ref 3.5–5.3)
RBC # BLD AUTO: 4.47 X10*6/UL (ref 4.5–5.9)
SODIUM SERPL-SCNC: 134 MMOL/L (ref 136–145)
WBC # BLD AUTO: 9.2 X10*3/UL (ref 4.4–11.3)

## 2024-11-08 PROCEDURE — 80069 RENAL FUNCTION PANEL: CPT

## 2024-11-08 PROCEDURE — 2500000001 HC RX 250 WO HCPCS SELF ADMINISTERED DRUGS (ALT 637 FOR MEDICARE OP): Performed by: INTERNAL MEDICINE

## 2024-11-08 PROCEDURE — 82947 ASSAY GLUCOSE BLOOD QUANT: CPT

## 2024-11-08 PROCEDURE — 2500000004 HC RX 250 GENERAL PHARMACY W/ HCPCS (ALT 636 FOR OP/ED): Performed by: INTERNAL MEDICINE

## 2024-11-08 PROCEDURE — 2500000002 HC RX 250 W HCPCS SELF ADMINISTERED DRUGS (ALT 637 FOR MEDICARE OP, ALT 636 FOR OP/ED)

## 2024-11-08 PROCEDURE — 2500000001 HC RX 250 WO HCPCS SELF ADMINISTERED DRUGS (ALT 637 FOR MEDICARE OP)

## 2024-11-08 PROCEDURE — 2060000001 HC INTERMEDIATE ICU ROOM DAILY

## 2024-11-08 PROCEDURE — 99233 SBSQ HOSP IP/OBS HIGH 50: CPT | Performed by: INTERNAL MEDICINE

## 2024-11-08 PROCEDURE — 85027 COMPLETE CBC AUTOMATED: CPT

## 2024-11-08 PROCEDURE — 92610 EVALUATE SWALLOWING FUNCTION: CPT | Mod: GN | Performed by: STUDENT IN AN ORGANIZED HEALTH CARE EDUCATION/TRAINING PROGRAM

## 2024-11-08 PROCEDURE — 36415 COLL VENOUS BLD VENIPUNCTURE: CPT

## 2024-11-08 PROCEDURE — 83735 ASSAY OF MAGNESIUM: CPT

## 2024-11-08 PROCEDURE — 2500000004 HC RX 250 GENERAL PHARMACY W/ HCPCS (ALT 636 FOR OP/ED)

## 2024-11-08 ASSESSMENT — COGNITIVE AND FUNCTIONAL STATUS - GENERAL
DRESSING REGULAR UPPER BODY CLOTHING: TOTAL
TURNING FROM BACK TO SIDE WHILE IN FLAT BAD: TOTAL
MOVING TO AND FROM BED TO CHAIR: TOTAL
DRESSING REGULAR LOWER BODY CLOTHING: TOTAL
STANDING UP FROM CHAIR USING ARMS: TOTAL
TOILETING: TOTAL
PERSONAL GROOMING: TOTAL
EATING MEALS: TOTAL
WALKING IN HOSPITAL ROOM: TOTAL
DAILY ACTIVITIY SCORE: 6
MOVING FROM LYING ON BACK TO SITTING ON SIDE OF FLAT BED WITH BEDRAILS: TOTAL
CLIMB 3 TO 5 STEPS WITH RAILING: TOTAL
HELP NEEDED FOR BATHING: TOTAL
MOBILITY SCORE: 6

## 2024-11-08 ASSESSMENT — PAIN SCALES - PAIN ASSESSMENT IN ADVANCED DEMENTIA (PAINAD)
FACIALEXPRESSION: SMILING OR INEXPRESSIVE
CONSOLABILITY: NO NEED TO CONSOLE
TOTALSCORE: 0
BODYLANGUAGE: RELAXED
BODYLANGUAGE: RELAXED
BREATHING: NORMAL
FACIALEXPRESSION: SMILING OR INEXPRESSIVE
CONSOLABILITY: NO NEED TO CONSOLE
TOTALSCORE: REPOSITIONED;EMOTIONAL SUPPORT;ELEVATED
TOTALSCORE: 0
BREATHING: NORMAL

## 2024-11-08 ASSESSMENT — PAIN SCALES - GENERAL: PAINLEVEL_OUTOF10: 0 - NO PAIN

## 2024-11-08 ASSESSMENT — PAIN - FUNCTIONAL ASSESSMENT
PAIN_FUNCTIONAL_ASSESSMENT: PAINAD (PAIN ASSESSMENT IN ADVANCED DEMENTIA SCALE)
PAIN_FUNCTIONAL_ASSESSMENT: 0-10
PAIN_FUNCTIONAL_ASSESSMENT: CPOT (CRITICAL CARE PAIN OBSERVATION TOOL)
PAIN_FUNCTIONAL_ASSESSMENT: PAINAD (PAIN ASSESSMENT IN ADVANCED DEMENTIA SCALE)
PAIN_FUNCTIONAL_ASSESSMENT: CPOT (CRITICAL CARE PAIN OBSERVATION TOOL)

## 2024-11-08 NOTE — NURSING NOTE
Patient arrived to unit alert, nonverbal on 1 L NC  Pt smiled with RN talked about the group home.    Call from facility states patient usually nods yes/no I did not recv a response.  Spoon feeds self with honey thick liquids.  Built up utensils.    Weaned pt off oxygen 94% on RA

## 2024-11-08 NOTE — PROGRESS NOTES
Speech-Language Pathology    Inpatient Clinical Swallow Evaluation    Patient Name: Maximo Pablo  MRN: [id number]  Today's Date: 11/8/2024   Time Calculation  Start Time: 1425  Stop Time: 1438  Time Calculation (min): 13 min          Current Problem:   1. Hypothermia, initial encounter          Recommendations:  Risk for Aspiration: Yes   Solid Diet Recommendations : NPO   Liquid Diet Recommendations: NPO   Compensatory Swallowing Strategies:  (regular oral care)     Medication Administration Recommendations: Non Oral      Assessment:  Consistencies Trialed: Consistencies Trialed: Honey thick/liquidised/moderately thick (IDDSI Level 3) - Spoon   Oral Motor: Unable to Complete      Oral/Motor Assessment  Dentition: Adequate/Natural  Oral Motor: Unable to Complete    Patient presents with known oropharyngeal dysphagia. A clinical swallow evaluation was conducted in order to assess if the patient is appropriate to return to his baseline oral diet. Oral care with a suction toothbrush was attempted but the patient was not particularly cooperative and did not open his mouth so care was limited. A spoon coated with moderately thick liquid  was presented x2. The patient partially opened his mouth and allowed the spoon to enter his mouth but there was no visible attempt to manipulate the bolus or initiate a swallow. A towel was used to clear the anterior portion of his oral cavity before leaving. Anterior loss of saliva noted. ST to follow.   Prognosis: Good   Medical Staff Made Aware: Yes     Baseline Assessment:  Baseline Assessment  Respiratory Status: Oxygen via nasal cannula  Patient Positioning: Partially/Semi Reclined     Relevant Imaging:  CXR IMPRESSION:  1.  Low inspiratory volume with mild bibasilar atelectasis.    MBSS completed 8/15/24 with recommendation for puree and moderately thick liquid  via spoon only.    Subjective   Patient seemed to pull away during oral care. He was alert but quite passive for the  evaluation.    Plan:  SLP Plan: Skilled SLP Skilled speech therapy for dysphagia treatment is warranted in order to provide training and instruction regarding the use of compensatory swallow strategies, oropharyngeal strengthening exercises, and pt/caregiver education in order to reduce risk of aspiration, dehydration and malnutrition.  SLP Frequency: 2x per week   Duration: 2 weeks   Next Treatment Priority: assess for baseline PO   Discussed POC: Nursing   Discussed Risks/Benefits: Yes   Patient/Caregiver Agreeable: Yes   Patient will be discontinued from speech therapy when no further skilled needs are identified in this setting.   SLP Discharge Recommendations: Skilled nursing facility placement    Goals: Established 11/8/24  Patient will:  Patient/Caregiver will demonstrate knowledge of taught compensatory strategies and dietary consistency recommendations to optimize functional swallow function without overt clinical signs and symptoms of aspiration or dysphagia    General Visit Information:  Pt. Admitted  11/6/24  Past medical history: Past Medical History Relevant to Rehab: 59 y.o. year old male patient with past medical history of cerebral palsy, MRDD, nonverbal at baseline, epilepsy, HTN, CKD stage II, SBO who was admitted to the ICU for septic shock and requiring pressor support.    Living Environment: Other (comment)           Reason for Referral: Patient was seen for a clinical swallow evaluation (CSE) to assess swallow function, determine least restrictive diet, determine if a modified barium swallow study is warranted and develop appropriate POC for the current setting.      Current Diet : NG tube   Prior to Session Communication: Bedside nurse   Pain:  Pain Assessment  Pain Assessment: CPOT (Critical Care Pain Observation Tool)  Unable to Self-Report Pain Reason: Nonverbal     Treatment Completed with evaluation:   No    Education:  Learner:  Patient  Barriers to Learning: Cognitive limitations ,  Communication limitations  Method: Verbal  Education - Topic: ST provided patient education regarding role of ST, purpose of assessment, clinical impressions, goals of treatment, and plan of care. Patient verbalized full comprehension, consistent with cognitive status. Education will be reinforced. ST further coordinated with RN regarding recommendations and precautions per this assessment, with RN verbalizing understanding.  Outcome:  Unable to meet

## 2024-11-08 NOTE — CARE PLAN
Pt. Will remain safe this shift.    Pt. Will tolerate q2hr turns to prevent skin breakdown this shift.    Pt. Will have right wrist restraint removed when criteria is met to have wrist restraint removed.    Pt. Will require less oxygenation needs by end of this shift.    1340:  Pt. Remained safe this shift.  Pt. Tolerated q2hr turns to prevent skin breakdown this shift.  Pt. Has not met criteria to have right wrist restraint removed.  Pt. Has required less oxygenation needs this shift.  Currently on 1 L NC.

## 2024-11-08 NOTE — PROGRESS NOTES
Maximo Pablo is a 59 y.o. male on day 2 of admission presenting with Hypothermia, initial encounter.      Subjective   Patient is seen and evaluated.  Not really responding.  Ended up calling patient's caregiver at group home and she reports that he is usually alert and nonverbal.  He will use his right hand to maneuver around and feed himself.  He is not to have his own liquids and he is spoon fed by the staff over there.  They have been dealing with constipation for him for a while and sometimes the regimen works other times it does not and he ends up in the hospital.       Objective     Last Recorded Vitals  BP (!) 132/93   Pulse 84   Temp 36.1 °C (97 °F) (Temporal)   Resp 19   Wt 78.4 kg (172 lb 13.5 oz)   SpO2 100%   Intake/Output last 3 Shifts:    Intake/Output Summary (Last 24 hours) at 11/8/2024 1138  Last data filed at 11/8/2024 1000  Gross per 24 hour   Intake 3330 ml   Output 2115 ml   Net 1215 ml       Admission Weight  Weight: 68 kg (150 lb) (11/06/24 0912)    Daily Weight  11/08/24 : 78.4 kg (172 lb 13.5 oz)      Physical Exam:  General: Sleepy, arousable, NG tube in place  HEENT: PERRLA, head is tilted to the side  Neck: Normal to inspection  Lungs: Clear to auscultation, work of breathing within normal limit  Cardiac: Regular rate and rhythm  Abdomen: Soft nontender, positive bowel sounds  : Robles catheter in place  Skin: Intact  Hematology: No petechia or excessive ecchymosis  Musculoskeletal: Upper extremity mittens and restraint noted  Neurological: Unable to assess.  His left upper extremity is contracted  Psych: Unable to assess    Relevant Results  Scheduled medications  [Held by provider] amLODIPine, 10 mg, oral, Daily  aspirin, 81 mg, nasogastric tube, Daily  chlorhexidine, 15 mL, Mouth/Throat, BID  cholecalciferol, 1,000 Units, nasogastric tube, Daily  [Held by provider] cloNIDine, 0.2 mg, oral, BID  enoxaparin, 40 mg, subcutaneous, q24h  insulin lispro, 0-5 Units, subcutaneous,  q4h  [Held by provider] labetalol, 100 mg, oral, BID  levETIRAcetam, 500 mg, nasogastric tube, BID  [Held by provider] lisinopril, 20 mg, oral, BID  OXcarbazepine, 150 mg, nasogastric tube, BID  OXcarbazepine, 300 mg, nasogastric tube, BID  polyethylene glycol, 17 g, nasogastric tube, BID  psyllium, 1 packet, nasogastric tube, Daily  thiamine, 100 mg, nasogastric tube, Daily      Continuous medications     PRN medications  PRN medications: acetaminophen **OR** acetaminophen **OR** [DISCONTINUED] acetaminophen, bisacodyl, dextrose, dextrose, glucagon, glucagon, ipratropium-albuteroL, oxygen, potassium chloride CR **OR** potassium chloride, potassium chloride CR **OR** potassium chloride, sodium phosphates   Labs  Results from last 7 days   Lab Units 11/08/24 0326 11/07/24 0418 11/06/24  1842   WBC AUTO x10*3/uL 9.2 4.7 3.6*   HEMOGLOBIN g/dL 11.8* 12.2* 12.6*   HEMATOCRIT % 35.7* 37.2* 38.1*   PLATELETS AUTO x10*3/uL 63* 75* 77*     Results from last 7 days   Lab Units 11/08/24 0326 11/07/24 0418 11/06/24  1842 11/06/24  1021   SODIUM mmol/L 134* 135* 133* 137   POTASSIUM mmol/L 4.2 4.1 4.4 4.3   CHLORIDE mmol/L 97* 98 97* 97*   CO2 mmol/L 34* 31 29 36*   BUN mg/dL 8 7 8 10   CREATININE mg/dL 0.88 1.04 1.10 1.19   CALCIUM mg/dL 9.4 9.7 9.9 11.1*   PROTEIN TOTAL g/dL  --   --  7.1 8.2   BILIRUBIN TOTAL mg/dL  --   --  0.3 0.2   ALK PHOS U/L  --   --  219* 258*   ALT U/L  --   --  32 39   AST U/L  --   --  41* 56*   GLUCOSE mg/dL 114* 99 160* 79       ECG 12 lead    Result Date: 11/7/2024  Marked sinus bradycardia with 1st degree AV block Right axis deviation Nonspecific T wave abnormality Abnormal ECG When compared with ECG of 12-AUG-2024 05:53, OH interval has increased Vent. rate has decreased BY  82 BPM QRS axis Shifted right Minimal criteria for Anterior infarct are no longer Present Non-specific change in ST segment in Lateral leads    XR chest 1 view    Result Date: 11/7/2024  Interpreted By:  Marvin Tejada,  STUDY: XR CHEST 1 VIEW;  11/7/2024 8:23 am   INDICATION: Signs/Symptoms:Decreased breath sounds on left, reduced SpO2, concern for aspiration event.   COMPARISON: 11/06/2024   ACCESSION NUMBER(S): [id number]   ORDERING CLINICIAN: HARRY CARRILLO   FINDINGS: The patient is kyphotic and rotated which can limit evaluation. The mandible partially obscures the lung apices. Nasogastric tube terminates in the stomach.   Small lung volumes. No definite focal infiltrates or pleural effusion. Cardiomediastinal silhouette unchanged. No pulmonary vascular congestion.       No definite active disease in the chest identified on hypoventilatory exam.   MACRO: None   Signed by: Marvin Tejada 11/7/2024 10:03 AM Dictation workstation:   [id number]    XR abdomen 1 view    Result Date: 11/6/2024  Interpreted By:  Harry Chow, STUDY: XR ABDOMEN 1 VIEW;  11/6/2024 7:29 pm   INDICATION: Signs/Symptoms:NG tube placement confirmation.     COMPARISON: None.   ACCESSION NUMBER(S): [id number]   ORDERING CLINICIAN: HARRY CARRILLO   FINDINGS: There is a nasogastric tube noted ending in the left upper quadrant satisfactory position.       1.  There is a nasogastric tube noted ending in the left upper quadrant satisfactory position.   MACRO: None   Signed by: Harry Chow 11/6/2024 7:37 PM Dictation workstation:   [id number]    CT abdomen pelvis w IV contrast    Result Date: 11/6/2024  Interpreted By:  Odell Lopez, STUDY: CT ABDOMEN PELVIS W IV CONTRAST; 11/6/2024 2:31 pm   INDICATION: Signs/Symptoms:constipation, hypothermia.   COMPARISON: 10/30/2024   ACCESSION NUMBER(S): [id number]   ORDERING CLINICIAN: ALVARO GARCIA   TECHNIQUE: Contiguous axial images were obtained at 3mm slice thickness through the abdomen and pelvis following intravenous contrast administration. Coronal and sagittal reconstructions at 3 mm slice thickness were performed. 75 ML of Omnipaque 350 was administered.   FINDINGS: LOWER CHEST: Evaluation of  the visualized lung bases demonstrates right basilar airspace consolidation, may represent atelectasis and/or pneumonia. The heart is within normal limits for size.   ABDOMEN:   LIVER: The liver is within normal limits for appearance, without evidence of focal masses.   BILE DUCTS: No definite intra or extrahepatic biliary dilatation is identified.   GALLBLADDER: The gallbladder is nondilated. No definite calcified gallstones are seen.   PANCREAS: The pancreas is within normal limits for appearance, without evidence of focal masses.   SPLEEN: The spleen is within normal limits for size. No focal splenic mass is seen.   ADRENAL GLANDS: No definite adrenal nodules or masses are seen bilaterally.   KIDNEYS AND URETERS: There is no hydronephrosis, hydroureter or renal/ ureteral calculus identified bilaterally. No definite focal renal mass is seen, though evaluation is severely limited by the lack of intravenous contrast.   PELVIS:   BLADDER: The urinary bladder is grossly unremarkable for CT appearance.   REPRODUCTIVE ORGANS: The prostate is within normal limits for appearance.   BOWEL: A moderate amount of residual stool is seen throughout the colon. The colon and small bowel are within normal limits for course, caliber and appearance, without evidence of wall thickening or obstruction. The appendix is decompressed. No CT evidence of acute diverticulitis or appendicitis is seen.   VESSELS: The abdominal aorta is within normal limits for course, caliber and appearance, without evidence of aneurysm.   PERITONEUM/RETROPERITONEUM/LYMPH NODES: There is no free intraperitoneal air or free fluid identified. No gross mesenteric or retroperitoneal lymphadenopathy is identified.   BONE AND SOFT TISSUE: There is no evidence of acute fracture identified. No evidence of abdominal wall mass or hernia is identified.       1. Moderate residual stool throughout the colon, as above. 2. No evidence of bowel obstruction, free  intraperitoneal air or abnormal intra-abdominal fluid collection.   MACRO: None   Signed by: Odell Lopez 11/6/2024 2:49 PM Dictation workstation:   [id number]    XR chest 1 view    Result Date: 11/6/2024  Interpreted By:  Harry Stover, STUDY: XR CHEST 1 VIEW;  11/6/2024 11:35 am   INDICATION: Signs/Symptoms:hypothermia.     COMPARISON: 08/12/2024.   ACCESSION NUMBER(S): [id number]   ORDERING CLINICIAN: ALVARO GARCIA   FINDINGS: CARDIOMEDIASTINAL SILHOUETTE: Cardiac silhouette is stable and not significantly enlarged.   LUNGS: Inspiratory volume is low with mild bibasilar atelectasis. No definite pleural effusion. No pneumothorax is seen.   ABDOMEN: Mild gaseous distention of upper abdominal bowel is noted.   BONES: Thoracolumbar mild levocurvature may be exaggerated by positioning.       1.  Low inspiratory volume with mild bibasilar atelectasis.       MACRO: None.   Signed by: Harry Stover 11/6/2024 12:02 PM Dictation workstation:   [id number]    XR abdomen 1 view    Result Date: 10/25/2024  Interpreted By:  Marvin Tejada, STUDY: XR ABDOMEN 1 VIEW;  10/25/2024 5:14 am   INDICATION: Signs/Symptoms:constipation.   COMPARISON: 10/24/2024   ACCESSION NUMBER(S): [id number]   ORDERING CLINICIAN: CRYSTAL JOSUE   FINDINGS: Contracted upper extremities obscure the upper abdomen. Gas scattered throughout nondilated small bowel. Stool burden appears low-to-moderate.       Nonobstructive bowel gas pattern.   MACRO: None   Signed by: Marvin Tejada 10/25/2024 8:07 AM Dictation workstation:   [id number]    XR abdomen 1 view    Result Date: 10/24/2024  Interpreted By:  Marvin Tejada, STUDY: XR ABDOMEN 1 VIEW;  10/24/2024 9:11 am   INDICATION: Signs/Symptoms:Abdominal distention, assess for ileus.   COMPARISON: 08/12/2024   ACCESSION NUMBER(S): [id number]   ORDERING CLINICIAN: LINWOOD MOON   FINDINGS: Gas scattered throughout small and large bowel without significant dilatation. Free air not excluded on supine images.  Upper abdomen partially obscured by upper extremities.       Nonobstructive bowel gas pattern without apparent ileus. Previous small bowel dilatation has resolved.   MACRO: None   Signed by: Marvin Tejada 10/24/2024 9:23 AM Dictation workstation:   [id number]    CT abdomen pelvis wo IV contrast    Result Date: 10/24/2024  STUDY: CT Abdomen and Pelvis without IV Contrast; 10/23/2024 at 11:36 PM INDICATION: Constipation.  History of fecal impaction. COMPARISON: CTA chest and CT abdomen and pelvis 8/9/2023, CT abdomen and pelvis 8/9/2023. ACCESSION NUMBER(S): [id number] ORDERING CLINICIAN: ISAMAR ESCOBAR TECHNIQUE: CT of the abdomen and pelvis was performed.  Contiguous axial images were obtained at 3 mm slice thickness through the abdomen and pelvis. Coronal and sagittal reconstructions at 3 mm slice thickness were performed. No intravenous contrast was administered.  Automated mA/kV exposure control was utilized and patient examination was performed in strict accordance with principles of ALARA. FINDINGS: Please note that the evaluation of vessels, lymph nodes and organs is limited without intravenous contrast.  LOWER CHEST: No cardiomegaly.  No pericardial effusion.  Lung bases are clear.  ABDOMEN and PELVIS: The liver, gallbladder and biliary tree show no concerning finding. The pancreas and spleen show no concerning finding. The adrenal glands show no concerning finding. The kidneys are normally located.  No stones or hydronephrosis.  No evidence of mass. No ureteral stones or hydroureter. The urinary bladder has a normal CT appearance. The rectum and distal colon are markedly distended with stool.  The rectum is mildly thickened.  Large amount of formed stool present throughout the remainder of the colon. Normal mesentery.  No lymphadenopathy.  No free air or fluid. The pelvic organs show no concerning CT finding. The imaged skeleton shows no acute pathology. Small fat-containing umbilical hernia  defect.    Severe constipation with fecal impaction and possible stercoral colitis. Signed by Dylon Barnett MD                    Assessment/Plan   Maximo Pablo is a 59 y.o. male on day 2 of admission presenting with Hypothermia, initial encounter.  Principal Problem:    Hypothermia, initial encounter    59-year-old male who lives in a group home secondary to cerebral palsy, MRDD, epilepsy, hypertension, CKD was admitted to ICU on 11/6/2024 due to hypothermia and hypotension.    Hypothermia, hypotension  Concerning for infectious etiology but that has been ruled out  Blood cultures have been negative  Urinalysis negative on admission  Patient remains off antibiotics  Hold off on blood pressure medications for now    Acute metabolic encephalopathy secondary to above  Restraints to protect the NG tube  Patient already pulled out once  Tube feeds  Will improve the nutrition status and monitor  Patient is on puréed diet and thickened liquid that is spoon fed by the staff    Recurrent constipation  Continue with aggressive bowel regimen    Epilepsy  Continue with Keppra, Trileptal    DVT prophylaxis  Continue with Lovenox     Plan discussed with nursing staff at bedside and spoke with patient's caregiver nurse Aminah Amos at [phone].      High Level of MDM based on above issue and discussing plan    This note is created using voice recognition software. All efforts are made to minimize errors, if there are errors there due to transcription.    Jason Pitts  Hospitalist

## 2024-11-08 NOTE — NURSING NOTE
0715:  Report received from previous shift RN.  See shift assessment, vital signs, care plan and education.  Chart check done.    1000:  Robles catheter removed w/o difficulty.  Pt tolerated procedure w/o difficulty.  Continue to monitor.      1209:  Oxygen decreased to 1 L NC.  Continue to monitor.    1315:  Pt. Transferred to room 2114 via bed, oxygen, transporter, monitor and RN.  Report called to Jose Juan at 1133 and 1158.  Jose Juan requested pt not be moved until 1 pm.

## 2024-11-08 NOTE — CARE PLAN
The clinical goals for the shift include patient will remain hemodynamically stable throughout the shift        Problem: Discharge Planning  Goal: Discharge to home or other facility with appropriate resources  Outcome: Progressing     Problem: Chronic Conditions and Co-morbidities  Goal: Patient's chronic conditions and co-morbidity symptoms are monitored and maintained or improved  Outcome: Progressing     Problem: Skin  Goal: Decreased wound size/increased tissue granulation at next dressing change  Outcome: Progressing  Goal: Participates in plan/prevention/treatment measures  Outcome: Progressing  Goal: Prevent/manage excess moisture  Outcome: Progressing  Goal: Prevent/minimize sheer/friction injuries  Outcome: Progressing  Goal: Promote/optimize nutrition  Outcome: Progressing  Goal: Promote skin healing  Outcome: Progressing     Problem: Safety - Medical Restraint  Goal: Remains free of injury from restraints (Restraint for Interference with Medical Device)  Outcome: Progressing  Goal: Free from restraint(s) (Restraint for Interference with Medical Device)  Outcome: Progressing     Problem: Indwelling Catheter Maintenance  Goal: I will have no complications from indwelling catheter  Outcome: Progressing

## 2024-11-09 LAB
10OH-CARBAZEPINE SERPL-MCNC: 46.3 UG/ML (ref 10–35)
ALBUMIN SERPL BCP-MCNC: 3.2 G/DL (ref 3.4–5)
ANION GAP SERPL CALC-SCNC: 9 MMOL/L (ref 10–20)
ATRIAL RATE: 48 BPM
BUN SERPL-MCNC: 12 MG/DL (ref 6–23)
CALCIUM SERPL-MCNC: 9.2 MG/DL (ref 8.6–10.3)
CHLORIDE SERPL-SCNC: 101 MMOL/L (ref 98–107)
CO2 SERPL-SCNC: 32 MMOL/L (ref 21–32)
CREAT SERPL-MCNC: 0.71 MG/DL (ref 0.5–1.3)
EGFRCR SERPLBLD CKD-EPI 2021: >90 ML/MIN/1.73M*2
ERYTHROCYTE [DISTWIDTH] IN BLOOD BY AUTOMATED COUNT: 15.7 % (ref 11.5–14.5)
GLUCOSE BLD MANUAL STRIP-MCNC: 123 MG/DL (ref 74–99)
GLUCOSE BLD MANUAL STRIP-MCNC: 127 MG/DL (ref 74–99)
GLUCOSE BLD MANUAL STRIP-MCNC: 136 MG/DL (ref 74–99)
GLUCOSE BLD MANUAL STRIP-MCNC: 137 MG/DL (ref 74–99)
GLUCOSE BLD MANUAL STRIP-MCNC: 152 MG/DL (ref 74–99)
GLUCOSE SERPL-MCNC: 147 MG/DL (ref 74–99)
HCT VFR BLD AUTO: 35 % (ref 41–52)
HGB BLD-MCNC: 11.3 G/DL (ref 13.5–17.5)
MAGNESIUM SERPL-MCNC: 2.05 MG/DL (ref 1.6–2.4)
MCH RBC QN AUTO: 26.3 PG (ref 26–34)
MCHC RBC AUTO-ENTMCNC: 32.3 G/DL (ref 32–36)
MCV RBC AUTO: 81 FL (ref 80–100)
NRBC BLD-RTO: 0.7 /100 WBCS (ref 0–0)
P OFFSET: 138 MS
P ONSET: 77 MS
PHOSPHATE SERPL-MCNC: 1.6 MG/DL (ref 2.5–4.9)
PLATELET # BLD AUTO: 53 X10*3/UL (ref 150–450)
POTASSIUM SERPL-SCNC: 4.5 MMOL/L (ref 3.5–5.3)
PR INTERVAL: 282 MS
Q ONSET: 218 MS
QRS COUNT: 8 BEATS
QRS DURATION: 86 MS
QT INTERVAL: 438 MS
QTC CALCULATION(BAZETT): 391 MS
QTC FREDERICIA: 406 MS
R AXIS: 138 DEGREES
RBC # BLD AUTO: 4.3 X10*6/UL (ref 4.5–5.9)
SODIUM SERPL-SCNC: 137 MMOL/L (ref 136–145)
T AXIS: 80 DEGREES
T OFFSET: 437 MS
VENTRICULAR RATE: 48 BPM
WBC # BLD AUTO: 4.5 X10*3/UL (ref 4.4–11.3)

## 2024-11-09 PROCEDURE — 2500000004 HC RX 250 GENERAL PHARMACY W/ HCPCS (ALT 636 FOR OP/ED): Performed by: INTERNAL MEDICINE

## 2024-11-09 PROCEDURE — 2500000001 HC RX 250 WO HCPCS SELF ADMINISTERED DRUGS (ALT 637 FOR MEDICARE OP): Performed by: INTERNAL MEDICINE

## 2024-11-09 PROCEDURE — 99233 SBSQ HOSP IP/OBS HIGH 50: CPT | Performed by: INTERNAL MEDICINE

## 2024-11-09 PROCEDURE — 80069 RENAL FUNCTION PANEL: CPT | Performed by: INTERNAL MEDICINE

## 2024-11-09 PROCEDURE — 83735 ASSAY OF MAGNESIUM: CPT | Performed by: INTERNAL MEDICINE

## 2024-11-09 PROCEDURE — 2060000001 HC INTERMEDIATE ICU ROOM DAILY

## 2024-11-09 PROCEDURE — 82947 ASSAY GLUCOSE BLOOD QUANT: CPT

## 2024-11-09 PROCEDURE — 36415 COLL VENOUS BLD VENIPUNCTURE: CPT | Performed by: INTERNAL MEDICINE

## 2024-11-09 PROCEDURE — 2500000002 HC RX 250 W HCPCS SELF ADMINISTERED DRUGS (ALT 637 FOR MEDICARE OP, ALT 636 FOR OP/ED): Performed by: INTERNAL MEDICINE

## 2024-11-09 PROCEDURE — 85027 COMPLETE CBC AUTOMATED: CPT | Performed by: INTERNAL MEDICINE

## 2024-11-09 SDOH — ECONOMIC STABILITY: INCOME INSECURITY
IN THE PAST 12 MONTHS HAS THE ELECTRIC, GAS, OIL, OR WATER COMPANY THREATENED TO SHUT OFF SERVICES IN YOUR HOME?: PATIENT DECLINED

## 2024-11-09 SDOH — ECONOMIC STABILITY: FOOD INSECURITY: HOW HARD IS IT FOR YOU TO PAY FOR THE VERY BASICS LIKE FOOD, HOUSING, MEDICAL CARE, AND HEATING?: PATIENT DECLINED

## 2024-11-09 SDOH — SOCIAL STABILITY: SOCIAL INSECURITY
WITHIN THE LAST YEAR, HAVE YOU BEEN HUMILIATED OR EMOTIONALLY ABUSED IN OTHER WAYS BY YOUR PARTNER OR EX-PARTNER?: PATIENT DECLINED

## 2024-11-09 SDOH — ECONOMIC STABILITY: FOOD INSECURITY: WITHIN THE PAST 12 MONTHS, THE FOOD YOU BOUGHT JUST DIDN'T LAST AND YOU DIDN'T HAVE MONEY TO GET MORE.: PATIENT DECLINED

## 2024-11-09 SDOH — SOCIAL STABILITY: SOCIAL INSECURITY
WITHIN THE LAST YEAR, HAVE YOU BEEN KICKED, HIT, SLAPPED, OR OTHERWISE PHYSICALLY HURT BY YOUR PARTNER OR EX-PARTNER?: PATIENT DECLINED

## 2024-11-09 SDOH — ECONOMIC STABILITY: TRANSPORTATION INSECURITY
IN THE PAST 12 MONTHS, HAS LACK OF TRANSPORTATION KEPT YOU FROM MEDICAL APPOINTMENTS OR FROM GETTING MEDICATIONS?: PATIENT DECLINED

## 2024-11-09 SDOH — SOCIAL STABILITY: SOCIAL INSECURITY
WITHIN THE LAST YEAR, HAVE YOU BEEN RAPED OR FORCED TO HAVE ANY KIND OF SEXUAL ACTIVITY BY YOUR PARTNER OR EX-PARTNER?: PATIENT DECLINED

## 2024-11-09 SDOH — ECONOMIC STABILITY: HOUSING INSECURITY: AT ANY TIME IN THE PAST 12 MONTHS, WERE YOU HOMELESS OR LIVING IN A SHELTER (INCLUDING NOW)?: PATIENT DECLINED

## 2024-11-09 SDOH — ECONOMIC STABILITY: FOOD INSECURITY
WITHIN THE PAST 12 MONTHS, YOU WORRIED THAT YOUR FOOD WOULD RUN OUT BEFORE YOU GOT THE MONEY TO BUY MORE.: PATIENT DECLINED

## 2024-11-09 SDOH — HEALTH STABILITY: PHYSICAL HEALTH: ON AVERAGE, HOW MANY MINUTES DO YOU ENGAGE IN EXERCISE AT THIS LEVEL?: PATIENT DECLINED

## 2024-11-09 SDOH — HEALTH STABILITY: PHYSICAL HEALTH
ON AVERAGE, HOW MANY DAYS PER WEEK DO YOU ENGAGE IN MODERATE TO STRENUOUS EXERCISE (LIKE A BRISK WALK)?: PATIENT DECLINED

## 2024-11-09 SDOH — SOCIAL STABILITY: SOCIAL INSECURITY: WITHIN THE LAST YEAR, HAVE YOU BEEN AFRAID OF YOUR PARTNER OR EX-PARTNER?: PATIENT DECLINED

## 2024-11-09 SDOH — ECONOMIC STABILITY: HOUSING INSECURITY: IN THE LAST 12 MONTHS, WAS THERE A TIME WHEN YOU WERE NOT ABLE TO PAY THE MORTGAGE OR RENT ON TIME?: PATIENT DECLINED

## 2024-11-09 ASSESSMENT — COGNITIVE AND FUNCTIONAL STATUS - GENERAL
STANDING UP FROM CHAIR USING ARMS: TOTAL
MOVING TO AND FROM BED TO CHAIR: TOTAL
TURNING FROM BACK TO SIDE WHILE IN FLAT BAD: TOTAL
DAILY ACTIVITIY SCORE: 6
CLIMB 3 TO 5 STEPS WITH RAILING: TOTAL
EATING MEALS: TOTAL
HELP NEEDED FOR BATHING: TOTAL
DRESSING REGULAR UPPER BODY CLOTHING: TOTAL
MOBILITY SCORE: 6
WALKING IN HOSPITAL ROOM: TOTAL
TOILETING: TOTAL
MOVING FROM LYING ON BACK TO SITTING ON SIDE OF FLAT BED WITH BEDRAILS: TOTAL
DRESSING REGULAR LOWER BODY CLOTHING: TOTAL
PERSONAL GROOMING: TOTAL

## 2024-11-09 ASSESSMENT — PAIN SCALES - PAIN ASSESSMENT IN ADVANCED DEMENTIA (PAINAD)
FACIALEXPRESSION: SMILING OR INEXPRESSIVE
BREATHING: NORMAL
FACIALEXPRESSION: SMILING OR INEXPRESSIVE
TOTALSCORE: 0
BREATHING: NORMAL
BODYLANGUAGE: RELAXED
TOTALSCORE: 0
TOTALSCORE: 0
CONSOLABILITY: NO NEED TO CONSOLE
BODYLANGUAGE: RELAXED
FACIALEXPRESSION: SMILING OR INEXPRESSIVE
TOTALSCORE: 0
BODYLANGUAGE: RELAXED
CONSOLABILITY: NO NEED TO CONSOLE
BREATHING: NORMAL
FACIALEXPRESSION: SMILING OR INEXPRESSIVE
BREATHING: NORMAL
BODYLANGUAGE: RELAXED

## 2024-11-09 ASSESSMENT — PAIN SCALES - GENERAL: PAINLEVEL_OUTOF10: 0 - NO PAIN

## 2024-11-09 ASSESSMENT — ACTIVITIES OF DAILY LIVING (ADL): LACK_OF_TRANSPORTATION: PATIENT DECLINED

## 2024-11-09 ASSESSMENT — PAIN - FUNCTIONAL ASSESSMENT
PAIN_FUNCTIONAL_ASSESSMENT: PAINAD (PAIN ASSESSMENT IN ADVANCED DEMENTIA SCALE)

## 2024-11-09 ASSESSMENT — PAIN SCALES - WONG BAKER: WONGBAKER_NUMERICALRESPONSE: NO HURT

## 2024-11-09 NOTE — NURSING NOTE
THE PT HAS HAD SPONT PERIODS OF WAKEFULNESS AND ALERTNESS AND SLEEP. THERE HAS BEEN NO VOCASLIZATION. THE PT X1 WAS NOTED TO KNODD IN RESPONSE TRO A QUESTION HOWEVER IT WAS NEVER REPRODUCED. THE PT APPEARS SEVERELY CONTRACTED X4 UPPER EXT> LE. POSITION OF COMFORT IS IN A RT FETAL POSITION. HE IS VERY STIFF AND RIGID TO MOVE OR BEND. BILAT HANDS ARE BADLY CONTRACTED. NO PURPOSEFUL MOVEMENT OBSERVED. THE PTS NECK IS ALSO NOTED STIFF AND RIGID. LAYS WITH IT EXTENDED OFF THE PILLOW AT TIMES. THE PT DOES NOT FOLLOW COMMANDS. PUPILS ARE PINPOINT AND REACTIVE. THE PT IS NOTED TO TRACK THE TRACKER AT TIMES. THE PT WITHDRAWS TO NOXIOUS STIMULI. RESPS ARE UNLABORED. C/O INTERM RHONCHI, INSPIR/EXPIR WHEEZING FROM UPPER AIRWAY SECRETIONS. BS ARE DIM IN BLL R>L. EQUAL CHEST SYMM WITH RESPS. ORALLY SUCTIONED SEVERAL TIMES FOR CLEAR THIN SECRETIONS. NOTED TO DROOL IF HIS HEAD IS TURNED DOWNWARD. UNABLE TO PROTECT HIS AIRWAY. C/O A MOIST UPPER AIRWAY RATTLE AT TIMES. THE PT IS RESISTANT TO SUCTIONING. SCANT AMTS SUCTIONED EFFECTIVELY. NO STRIATOR OBSERVED. ON RA; O2 SATS MAINTAINED > 97%. ASPIR PRECAUTIONS MAINTAINED. ABD IS SOFT. C/O A PALPABLE  NOTED HERNIA IN THE PTS UMBILICAL REGION. BS + THE PT WAS INCONT OF A LG AMT OF STOOL- LOOSE AND BROWN X2.  TUBE FEEDS WERE MAINTAINED @ THE GOAL RATE WITH WATER FLUSHES THRU A LT NARE NG TUBE. < 10ml RESIDUALS NOTED. THE PTS BS RANGED BETWEEN 133- 152-147. NO SEIZURE ACTIVITY HAS BEEN OBSERVED. THE PT REMAINS ON KEPPRA AND TRILEPTAL . U/O > 800ML THIS SHIFT. BP HAVE BEEN WNL. TEMP AS WELL. NO SIGNS OF DISTRESS OR PAIN WERE OBSERVED T/O THIS SHIFT. THE PT WAS TURNED Q 2 HRS  AND  SIMPLE PASSIVE ROM EXCERCISING WAS PERFORMED.

## 2024-11-09 NOTE — PROGRESS NOTES
Maximo Pablo is a 59 y.o. male on day 3 of admission presenting with Hypothermia, initial encounter.      Subjective   Patient is awake and looking around.  Seems to improved significantly.  NG tube in place.  Does not really respond which is his baseline and nonverbal       Objective     Last Recorded Vitals  /72 (BP Location: Right arm, Patient Position: Lying)   Pulse 98   Temp 36.4 °C (97.5 °F) (Temporal)   Resp 20   Wt 75.2 kg (165 lb 12.6 oz)   SpO2 98%   Intake/Output last 3 Shifts:    Intake/Output Summary (Last 24 hours) at 11/9/2024 1038  Last data filed at 11/9/2024 1000  Gross per 24 hour   Intake 2500 ml   Output 1030 ml   Net 1470 ml       Admission Weight  Weight: 68 kg (150 lb) (11/06/24 0912)    Daily Weight  11/09/24 : 75.2 kg (165 lb 12.6 oz)      Physical Exam:  General: Awake and looking around and grimacing every now and then.  Does not appear to be in distress.  NG tube in place  HEENT: PERRLA, head is tilted to the side and drooling  Neck: Normal to inspection  Lungs: Clear to auscultation, work of breathing within normal limit  Cardiac: Regular rate and rhythm  Abdomen: Soft nontender, positive bowel sounds  :  deferred  Skin: Intact  Hematology: No petechia or excessive ecchymosis  Musculoskeletal: Upper extremity mittens and restraint noted  Neurological: Unable to assess.  His left upper extremity is contracted  Psych: Unable to assess    Relevant Results  Scheduled medications  [Held by provider] amLODIPine, 10 mg, oral, Daily  aspirin, 81 mg, nasogastric tube, Daily  chlorhexidine, 15 mL, Mouth/Throat, BID  cholecalciferol, 1,000 Units, nasogastric tube, Daily  [Held by provider] cloNIDine, 0.2 mg, oral, BID  insulin lispro, 0-5 Units, subcutaneous, q4h  [Held by provider] labetalol, 100 mg, oral, BID  levETIRAcetam, 500 mg, nasogastric tube, BID  [Held by provider] lisinopril, 20 mg, oral, BID  OXcarbazepine, 150 mg, nasogastric tube, BID  OXcarbazepine, 300 mg,  nasogastric tube, BID  polyethylene glycol, 17 g, nasogastric tube, BID  psyllium, 1 packet, nasogastric tube, Daily  thiamine, 100 mg, nasogastric tube, Daily      Continuous medications     PRN medications  PRN medications: acetaminophen **OR** acetaminophen **OR** [DISCONTINUED] acetaminophen, bisacodyl, dextrose, dextrose, glucagon, glucagon, ipratropium-albuteroL, oxygen, potassium chloride CR **OR** potassium chloride, potassium chloride CR **OR** potassium chloride, sodium phosphates   Labs  Results from last 7 days   Lab Units 11/09/24 0344 11/08/24 0326 11/07/24 0418   WBC AUTO x10*3/uL 4.5 9.2 4.7   HEMOGLOBIN g/dL 11.3* 11.8* 12.2*   HEMATOCRIT % 35.0* 35.7* 37.2*   PLATELETS AUTO x10*3/uL 53* 63* 75*     Results from last 7 days   Lab Units 11/09/24 0344 11/08/24 0326 11/07/24 0418 11/06/24  1842 11/06/24  1021   SODIUM mmol/L 137 134* 135* 133* 137   POTASSIUM mmol/L 4.5 4.2 4.1 4.4 4.3   CHLORIDE mmol/L 101 97* 98 97* 97*   CO2 mmol/L 32 34* 31 29 36*   BUN mg/dL 12 8 7 8 10   CREATININE mg/dL 0.71 0.88 1.04 1.10 1.19   CALCIUM mg/dL 9.2 9.4 9.7 9.9 11.1*   PROTEIN TOTAL g/dL  --   --   --  7.1 8.2   BILIRUBIN TOTAL mg/dL  --   --   --  0.3 0.2   ALK PHOS U/L  --   --   --  219* 258*   ALT U/L  --   --   --  32 39   AST U/L  --   --   --  41* 56*   GLUCOSE mg/dL 147* 114* 99 160* 79       ECG 12 lead    Result Date: 11/7/2024  Marked sinus bradycardia with 1st degree AV block Right axis deviation Nonspecific T wave abnormality Abnormal ECG When compared with ECG of 12-AUG-2024 05:53, MN interval has increased Vent. rate has decreased BY  82 BPM QRS axis Shifted right Minimal criteria for Anterior infarct are no longer Present Non-specific change in ST segment in Lateral leads    XR chest 1 view    Result Date: 11/7/2024  Interpreted By:  Marvin Tejada, STUDY: XR CHEST 1 VIEW;  11/7/2024 8:23 am   INDICATION: Signs/Symptoms:Decreased breath sounds on left, reduced SpO2, concern for aspiration  event.   COMPARISON: 11/06/2024   ACCESSION NUMBER(S): [id number]   ORDERING CLINICIAN: HARRY CARRILLO   FINDINGS: The patient is kyphotic and rotated which can limit evaluation. The mandible partially obscures the lung apices. Nasogastric tube terminates in the stomach.   Small lung volumes. No definite focal infiltrates or pleural effusion. Cardiomediastinal silhouette unchanged. No pulmonary vascular congestion.       No definite active disease in the chest identified on hypoventilatory exam.   MACRO: None   Signed by: Marvin Tejada 11/7/2024 10:03 AM Dictation workstation:   [id number]    XR abdomen 1 view    Result Date: 11/6/2024  Interpreted By:  Harry Chow, STUDY: XR ABDOMEN 1 VIEW;  11/6/2024 7:29 pm   INDICATION: Signs/Symptoms:NG tube placement confirmation.     COMPARISON: None.   ACCESSION NUMBER(S): [id number]   ORDERING CLINICIAN: HARRY CARRILLO   FINDINGS: There is a nasogastric tube noted ending in the left upper quadrant satisfactory position.       1.  There is a nasogastric tube noted ending in the left upper quadrant satisfactory position.   MACRO: None   Signed by: Harry Chow 11/6/2024 7:37 PM Dictation workstation:   [id number]    CT abdomen pelvis w IV contrast    Result Date: 11/6/2024  Interpreted By:  Odell Lopez, STUDY: CT ABDOMEN PELVIS W IV CONTRAST; 11/6/2024 2:31 pm   INDICATION: Signs/Symptoms:constipation, hypothermia.   COMPARISON: 10/30/2024   ACCESSION NUMBER(S): [id number]   ORDERING CLINICIAN: ALVARO GARCIA   TECHNIQUE: Contiguous axial images were obtained at 3mm slice thickness through the abdomen and pelvis following intravenous contrast administration. Coronal and sagittal reconstructions at 3 mm slice thickness were performed. 75 ML of Omnipaque 350 was administered.   FINDINGS: LOWER CHEST: Evaluation of the visualized lung bases demonstrates right basilar airspace consolidation, may represent atelectasis and/or pneumonia. The heart is within  normal limits for size.   ABDOMEN:   LIVER: The liver is within normal limits for appearance, without evidence of focal masses.   BILE DUCTS: No definite intra or extrahepatic biliary dilatation is identified.   GALLBLADDER: The gallbladder is nondilated. No definite calcified gallstones are seen.   PANCREAS: The pancreas is within normal limits for appearance, without evidence of focal masses.   SPLEEN: The spleen is within normal limits for size. No focal splenic mass is seen.   ADRENAL GLANDS: No definite adrenal nodules or masses are seen bilaterally.   KIDNEYS AND URETERS: There is no hydronephrosis, hydroureter or renal/ ureteral calculus identified bilaterally. No definite focal renal mass is seen, though evaluation is severely limited by the lack of intravenous contrast.   PELVIS:   BLADDER: The urinary bladder is grossly unremarkable for CT appearance.   REPRODUCTIVE ORGANS: The prostate is within normal limits for appearance.   BOWEL: A moderate amount of residual stool is seen throughout the colon. The colon and small bowel are within normal limits for course, caliber and appearance, without evidence of wall thickening or obstruction. The appendix is decompressed. No CT evidence of acute diverticulitis or appendicitis is seen.   VESSELS: The abdominal aorta is within normal limits for course, caliber and appearance, without evidence of aneurysm.   PERITONEUM/RETROPERITONEUM/LYMPH NODES: There is no free intraperitoneal air or free fluid identified. No gross mesenteric or retroperitoneal lymphadenopathy is identified.   BONE AND SOFT TISSUE: There is no evidence of acute fracture identified. No evidence of abdominal wall mass or hernia is identified.       1. Moderate residual stool throughout the colon, as above. 2. No evidence of bowel obstruction, free intraperitoneal air or abnormal intra-abdominal fluid collection.   MACRO: None   Signed by: Odell Lopez 11/6/2024 2:49 PM Dictation workstation:    [id number]    XR chest 1 view    Result Date: 11/6/2024  Interpreted By:  Harry Stover, STUDY: XR CHEST 1 VIEW;  11/6/2024 11:35 am   INDICATION: Signs/Symptoms:hypothermia.     COMPARISON: 08/12/2024.   ACCESSION NUMBER(S): [id number]   ORDERING CLINICIAN: ALVARO GARCIA   FINDINGS: CARDIOMEDIASTINAL SILHOUETTE: Cardiac silhouette is stable and not significantly enlarged.   LUNGS: Inspiratory volume is low with mild bibasilar atelectasis. No definite pleural effusion. No pneumothorax is seen.   ABDOMEN: Mild gaseous distention of upper abdominal bowel is noted.   BONES: Thoracolumbar mild levocurvature may be exaggerated by positioning.       1.  Low inspiratory volume with mild bibasilar atelectasis.       MACRO: None.   Signed by: Harry Stover 11/6/2024 12:02 PM Dictation workstation:   [id number]    XR abdomen 1 view    Result Date: 10/25/2024  Interpreted By:  Marvin Tejada, STUDY: XR ABDOMEN 1 VIEW;  10/25/2024 5:14 am   INDICATION: Signs/Symptoms:constipation.   COMPARISON: 10/24/2024   ACCESSION NUMBER(S): [id number]   ORDERING CLINICIAN: CRYSTAL JOSUE   FINDINGS: Contracted upper extremities obscure the upper abdomen. Gas scattered throughout nondilated small bowel. Stool burden appears low-to-moderate.       Nonobstructive bowel gas pattern.   MACRO: None   Signed by: Marvin Tejada 10/25/2024 8:07 AM Dictation workstation:   [id number]    XR abdomen 1 view    Result Date: 10/24/2024  Interpreted By:  Marvin Tejada, STUDY: XR ABDOMEN 1 VIEW;  10/24/2024 9:11 am   INDICATION: Signs/Symptoms:Abdominal distention, assess for ileus.   COMPARISON: 08/12/2024   ACCESSION NUMBER(S): [id number]   ORDERING CLINICIAN: LINWOOD MOON   FINDINGS: Gas scattered throughout small and large bowel without significant dilatation. Free air not excluded on supine images. Upper abdomen partially obscured by upper extremities.       Nonobstructive bowel gas pattern without apparent ileus. Previous small bowel  dilatation has resolved.   MACRO: None   Signed by: Marvin Tejada 10/24/2024 9:23 AM Dictation workstation:   [id number]    CT abdomen pelvis wo IV contrast    Result Date: 10/24/2024  STUDY: CT Abdomen and Pelvis without IV Contrast; 10/23/2024 at 11:36 PM INDICATION: Constipation.  History of fecal impaction. COMPARISON: CTA chest and CT abdomen and pelvis 8/9/2023, CT abdomen and pelvis 8/9/2023. ACCESSION NUMBER(S): [id number] ORDERING CLINICIAN: ISAMAR ESCOBAR TECHNIQUE: CT of the abdomen and pelvis was performed.  Contiguous axial images were obtained at 3 mm slice thickness through the abdomen and pelvis. Coronal and sagittal reconstructions at 3 mm slice thickness were performed. No intravenous contrast was administered.  Automated mA/kV exposure control was utilized and patient examination was performed in strict accordance with principles of ALARA. FINDINGS: Please note that the evaluation of vessels, lymph nodes and organs is limited without intravenous contrast.  LOWER CHEST: No cardiomegaly.  No pericardial effusion.  Lung bases are clear.  ABDOMEN and PELVIS: The liver, gallbladder and biliary tree show no concerning finding. The pancreas and spleen show no concerning finding. The adrenal glands show no concerning finding. The kidneys are normally located.  No stones or hydronephrosis.  No evidence of mass. No ureteral stones or hydroureter. The urinary bladder has a normal CT appearance. The rectum and distal colon are markedly distended with stool.  The rectum is mildly thickened.  Large amount of formed stool present throughout the remainder of the colon. Normal mesentery.  No lymphadenopathy.  No free air or fluid. The pelvic organs show no concerning CT finding. The imaged skeleton shows no acute pathology. Small fat-containing umbilical hernia defect.    Severe constipation with fecal impaction and possible stercoral colitis. Signed by Dylon Barnett MD                    Assessment/Plan    Maximo Pablo is a 59 y.o. male on day 3 of admission presenting with Hypothermia, initial encounter.  Principal Problem:    Hypothermia, initial encounter    59-year-old male who lives in a group home secondary to cerebral palsy, MRDD, epilepsy, hypertension, CKD was admitted to ICU on 11/6/2024 due to hypothermia and hypotension.    Hypothermia, hypotension  Concerning for infectious etiology but that has been ruled out  Blood cultures have been negativex 2 days  Urinalysis negative on admission   Patient remains off antibiotics  Restart labetalol  Holding off on clonidine, lisinopril and amlodipine for the time being    Acute metabolic encephalopathy secondary to above-clinically improving and patient is looking around now  Discontinue restrain  Tube feeds via NG tube until speech and swallow evaluates patient and resuming the diet  Clinically improving and will continue to monitor  Patient was on puréed diet and thickened liquid that is spoon fed by the staff    Recurrent constipation  Continue with aggressive bowel regimen  Having multiple bowel movements    Epilepsy  Continue with Keppra, Trileptal    Thrombocytopenia  Discontinue Lovenox  Continue to trend off Lovenox    DVT prophylaxis  SCDs     Plan discussed with nursing staff at bedside.  On 11/8/2024 spoke with patient's caregiver nurse Aminah Amos at [phone].      High Level of MDM based on above issue and discussing plan    This note is created using voice recognition software. All efforts are made to minimize errors, if there are errors there due to transcription.    Jason Pitts  Hospitalist

## 2024-11-09 NOTE — CARE PLAN
The patient's goals for the shift include  helen;nv.    The clinical goals for the shift include the patient will remain hemodynamically stable throughout the shift and tolerate his goal tf rate of 60ml/hr = H20 BOLUSES OF 180ML Q4HRS ; SNF PRODUCE 30ML/HR OF URINE OUTPUT BY THE END OF THIS SHIFT.         normal (ped)...

## 2024-11-09 NOTE — CARE PLAN
The clinical goals for the shift include patient will remain hemodynamically stable throughout the shift    Pt has remained hemodynamically stable throughout shift, TF at goal rate, multiple loose Bms throughout shift, turned and repositioned every two hours    Problem: Discharge Planning  Goal: Discharge to home or other facility with appropriate resources  Outcome: Progressing     Problem: Chronic Conditions and Co-morbidities  Goal: Patient's chronic conditions and co-morbidity symptoms are monitored and maintained or improved  Outcome: Progressing     Problem: Skin  Goal: Decreased wound size/increased tissue granulation at next dressing change  Outcome: Progressing  Goal: Participates in plan/prevention/treatment measures  Outcome: Progressing  Goal: Prevent/manage excess moisture  Outcome: Progressing  Goal: Prevent/minimize sheer/friction injuries  Outcome: Progressing  Goal: Promote/optimize nutrition  Outcome: Progressing  Goal: Promote skin healing  Outcome: Progressing       Problem: Fall/Injury  Goal: Not fall by end of shift  Outcome: Progressing  Goal: Be free from injury by end of the shift  Outcome: Progressing  Goal: Verbalize understanding of personal risk factors for fall in the hospital  Outcome: Progressing  Goal: Use assistive devices by end of the shift  Outcome: Progressing  Goal: Pace activities to prevent fatigue by end of the shift  Outcome: Progressing

## 2024-11-10 VITALS
RESPIRATION RATE: 15 BRPM | HEART RATE: 84 BPM | HEIGHT: 64 IN | OXYGEN SATURATION: 97 % | SYSTOLIC BLOOD PRESSURE: 120 MMHG | WEIGHT: 174.8 LBS | TEMPERATURE: 97.5 F | BODY MASS INDEX: 29.84 KG/M2 | DIASTOLIC BLOOD PRESSURE: 54 MMHG

## 2024-11-10 LAB
ALBUMIN SERPL BCP-MCNC: 3 G/DL (ref 3.4–5)
ALP SERPL-CCNC: 213 U/L (ref 33–120)
ALT SERPL W P-5'-P-CCNC: 38 U/L (ref 10–52)
ANION GAP SERPL CALC-SCNC: 7 MMOL/L (ref 10–20)
AST SERPL W P-5'-P-CCNC: 68 U/L (ref 9–39)
BACTERIA BLD CULT: NORMAL
BACTERIA BLD CULT: NORMAL
BASOPHILS # BLD AUTO: 0.01 X10*3/UL (ref 0–0.1)
BASOPHILS NFR BLD AUTO: 0.2 %
BILIRUB SERPL-MCNC: 0.3 MG/DL (ref 0–1.2)
BUN SERPL-MCNC: 11 MG/DL (ref 6–23)
CALCIUM SERPL-MCNC: 9 MG/DL (ref 8.6–10.3)
CHLORIDE SERPL-SCNC: 100 MMOL/L (ref 98–107)
CO2 SERPL-SCNC: 32 MMOL/L (ref 21–32)
CREAT SERPL-MCNC: 0.73 MG/DL (ref 0.5–1.3)
EGFRCR SERPLBLD CKD-EPI 2021: >90 ML/MIN/1.73M*2
EOSINOPHIL # BLD AUTO: 0.18 X10*3/UL (ref 0–0.7)
EOSINOPHIL NFR BLD AUTO: 3 %
ERYTHROCYTE [DISTWIDTH] IN BLOOD BY AUTOMATED COUNT: 16.1 % (ref 11.5–14.5)
GLUCOSE BLD MANUAL STRIP-MCNC: 130 MG/DL (ref 74–99)
GLUCOSE BLD MANUAL STRIP-MCNC: 130 MG/DL (ref 74–99)
GLUCOSE BLD MANUAL STRIP-MCNC: 132 MG/DL (ref 74–99)
GLUCOSE BLD MANUAL STRIP-MCNC: 138 MG/DL (ref 74–99)
GLUCOSE BLD MANUAL STRIP-MCNC: 146 MG/DL (ref 74–99)
GLUCOSE BLD MANUAL STRIP-MCNC: 164 MG/DL (ref 74–99)
GLUCOSE BLD MANUAL STRIP-MCNC: 174 MG/DL (ref 74–99)
GLUCOSE SERPL-MCNC: 133 MG/DL (ref 74–99)
HCT VFR BLD AUTO: 31.8 % (ref 41–52)
HGB BLD-MCNC: 10.4 G/DL (ref 13.5–17.5)
IMM GRANULOCYTES # BLD AUTO: 0.03 X10*3/UL (ref 0–0.7)
IMM GRANULOCYTES NFR BLD AUTO: 0.5 % (ref 0–0.9)
LYMPHOCYTES # BLD AUTO: 1.62 X10*3/UL (ref 1.2–4.8)
LYMPHOCYTES NFR BLD AUTO: 26.9 %
MAGNESIUM SERPL-MCNC: 1.86 MG/DL (ref 1.6–2.4)
MCH RBC QN AUTO: 26.7 PG (ref 26–34)
MCHC RBC AUTO-ENTMCNC: 32.7 G/DL (ref 32–36)
MCV RBC AUTO: 82 FL (ref 80–100)
MONOCYTES # BLD AUTO: 0.49 X10*3/UL (ref 0.1–1)
MONOCYTES NFR BLD AUTO: 8.1 %
NEUTROPHILS # BLD AUTO: 3.7 X10*3/UL (ref 1.2–7.7)
NEUTROPHILS NFR BLD AUTO: 61.3 %
NRBC BLD-RTO: 0 /100 WBCS (ref 0–0)
PHOSPHATE SERPL-MCNC: 1.9 MG/DL (ref 2.5–4.9)
PLATELET # BLD AUTO: 70 X10*3/UL (ref 150–450)
POTASSIUM SERPL-SCNC: 4.2 MMOL/L (ref 3.5–5.3)
PROT SERPL-MCNC: 6.5 G/DL (ref 6.4–8.2)
RBC # BLD AUTO: 3.9 X10*6/UL (ref 4.5–5.9)
SODIUM SERPL-SCNC: 135 MMOL/L (ref 136–145)
WBC # BLD AUTO: 6 X10*3/UL (ref 4.4–11.3)

## 2024-11-10 PROCEDURE — 36415 COLL VENOUS BLD VENIPUNCTURE: CPT | Performed by: INTERNAL MEDICINE

## 2024-11-10 PROCEDURE — 80053 COMPREHEN METABOLIC PANEL: CPT | Performed by: INTERNAL MEDICINE

## 2024-11-10 PROCEDURE — 99232 SBSQ HOSP IP/OBS MODERATE 35: CPT | Performed by: INTERNAL MEDICINE

## 2024-11-10 PROCEDURE — 92526 ORAL FUNCTION THERAPY: CPT | Mod: GN | Performed by: STUDENT IN AN ORGANIZED HEALTH CARE EDUCATION/TRAINING PROGRAM

## 2024-11-10 PROCEDURE — 2500000002 HC RX 250 W HCPCS SELF ADMINISTERED DRUGS (ALT 637 FOR MEDICARE OP, ALT 636 FOR OP/ED): Performed by: INTERNAL MEDICINE

## 2024-11-10 PROCEDURE — 84100 ASSAY OF PHOSPHORUS: CPT | Performed by: INTERNAL MEDICINE

## 2024-11-10 PROCEDURE — 83735 ASSAY OF MAGNESIUM: CPT | Performed by: INTERNAL MEDICINE

## 2024-11-10 PROCEDURE — 2500000001 HC RX 250 WO HCPCS SELF ADMINISTERED DRUGS (ALT 637 FOR MEDICARE OP): Performed by: INTERNAL MEDICINE

## 2024-11-10 PROCEDURE — 1200000002 HC GENERAL ROOM WITH TELEMETRY DAILY

## 2024-11-10 PROCEDURE — 82947 ASSAY GLUCOSE BLOOD QUANT: CPT

## 2024-11-10 PROCEDURE — 2500000004 HC RX 250 GENERAL PHARMACY W/ HCPCS (ALT 636 FOR OP/ED): Performed by: INTERNAL MEDICINE

## 2024-11-10 PROCEDURE — 85025 COMPLETE CBC W/AUTO DIFF WBC: CPT | Performed by: INTERNAL MEDICINE

## 2024-11-10 RX ORDER — INSULIN LISPRO 100 [IU]/ML
0-10 INJECTION, SOLUTION INTRAVENOUS; SUBCUTANEOUS
Status: ACTIVE | OUTPATIENT
Start: 2024-11-10

## 2024-11-10 ASSESSMENT — COGNITIVE AND FUNCTIONAL STATUS - GENERAL
EATING MEALS: TOTAL
STANDING UP FROM CHAIR USING ARMS: TOTAL
MOVING TO AND FROM BED TO CHAIR: TOTAL
MOVING FROM LYING ON BACK TO SITTING ON SIDE OF FLAT BED WITH BEDRAILS: TOTAL
TOILETING: TOTAL
CLIMB 3 TO 5 STEPS WITH RAILING: TOTAL
MOBILITY SCORE: 6
TURNING FROM BACK TO SIDE WHILE IN FLAT BAD: TOTAL
DRESSING REGULAR UPPER BODY CLOTHING: TOTAL
DRESSING REGULAR LOWER BODY CLOTHING: TOTAL
DAILY ACTIVITIY SCORE: 6
HELP NEEDED FOR BATHING: TOTAL
WALKING IN HOSPITAL ROOM: TOTAL
PERSONAL GROOMING: TOTAL

## 2024-11-10 ASSESSMENT — PAIN SCALES - PAIN ASSESSMENT IN ADVANCED DEMENTIA (PAINAD)
BODYLANGUAGE: RELAXED
CONSOLABILITY: NO NEED TO CONSOLE
FACIALEXPRESSION: SMILING OR INEXPRESSIVE
FACIALEXPRESSION: SMILING OR INEXPRESSIVE
TOTALSCORE: 0
BREATHING: NORMAL
FACIALEXPRESSION: SMILING OR INEXPRESSIVE
TOTALSCORE: 0
BREATHING: NORMAL
BREATHING: NORMAL
FACIALEXPRESSION: SMILING OR INEXPRESSIVE
TOTALSCORE: 0
BODYLANGUAGE: RELAXED
BREATHING: NORMAL
BREATHING: NORMAL
TOTALSCORE: 0
CONSOLABILITY: NO NEED TO CONSOLE
BODYLANGUAGE: RELAXED
BREATHING: NORMAL
CONSOLABILITY: NO NEED TO CONSOLE
CONSOLABILITY: NO NEED TO CONSOLE
TOTALSCORE: 0
BODYLANGUAGE: RELAXED
CONSOLABILITY: NO NEED TO CONSOLE
BREATHING: NORMAL
TOTALSCORE: 0
FACIALEXPRESSION: SMILING OR INEXPRESSIVE
TOTALSCORE: 0
BODYLANGUAGE: RELAXED
CONSOLABILITY: NO NEED TO CONSOLE
FACIALEXPRESSION: SMILING OR INEXPRESSIVE
BODYLANGUAGE: RELAXED
FACIALEXPRESSION: SMILING OR INEXPRESSIVE
CONSOLABILITY: NO NEED TO CONSOLE
BODYLANGUAGE: RELAXED

## 2024-11-10 ASSESSMENT — PAIN - FUNCTIONAL ASSESSMENT
PAIN_FUNCTIONAL_ASSESSMENT: PAINAD (PAIN ASSESSMENT IN ADVANCED DEMENTIA SCALE)

## 2024-11-10 ASSESSMENT — PAIN SCALES - GENERAL: PAINLEVEL_OUTOF10: 0 - NO PAIN

## 2024-11-10 ASSESSMENT — PAIN SCALES - WONG BAKER: WONGBAKER_NUMERICALRESPONSE: NO HURT

## 2024-11-10 NOTE — PROGRESS NOTES
Maximo Pablo is a 59 y.o. male on day 4 of admission presenting with Hypothermia, initial encounter.      Subjective   Patient about the same.  Does not open his mouth for speech eval.  Called patient's family member Michelle about patient's condition at [phone] and spoke with her.  She would like to think about it till tomorrow before making any decision       Objective     Last Recorded Vitals  /63 (BP Location: Left arm, Patient Position: Lying)   Pulse 84   Temp 36.5 °C (97.7 °F) (Temporal)   Resp 14   Wt 79.3 kg (174 lb 12.8 oz)   SpO2 95%   Intake/Output last 3 Shifts:    Intake/Output Summary (Last 24 hours) at 11/10/2024 0920  Last data filed at 11/10/2024 0800  Gross per 24 hour   Intake 2410 ml   Output 1250 ml   Net 1160 ml       Admission Weight  Weight: 68 kg (150 lb) (11/06/24 0912)    Daily Weight  11/10/24 : 79.3 kg (174 lb 12.8 oz)      Physical Exam:  General: Alert awake looking around with NG tube in place.  Does not follow any commands  HEENT: PERRLA, head is tilted to the side and drooling  Neck: Normal to inspection  Lungs: Clear to auscultation, work of breathing within normal limit  Cardiac: Regular rate and rhythm  Abdomen: Soft nontender, positive bowel sounds  :  deferred  Skin: Intact  Hematology: No petechia or excessive ecchymosis  Musculoskeletal: Upper extremity mittens and restraint noted  Neurological: Unable to assess as not following any command.  Does smile periodically.  His left upper extremity is contracted  Psych: Unable to assess    Relevant Results  Scheduled medications  [Held by provider] amLODIPine, 10 mg, oral, Daily  aspirin, 81 mg, nasogastric tube, Daily  chlorhexidine, 15 mL, Mouth/Throat, BID  cholecalciferol, 1,000 Units, nasogastric tube, Daily  [Held by provider] cloNIDine, 0.2 mg, oral, BID  insulin lispro, 0-5 Units, subcutaneous, q4h  labetalol, 100 mg, oral, BID  levETIRAcetam, 500 mg, nasogastric tube, BID  [Held by provider] lisinopril, 20  mg, oral, BID  OXcarbazepine, 150 mg, nasogastric tube, BID  OXcarbazepine, 300 mg, nasogastric tube, BID  polyethylene glycol, 17 g, nasogastric tube, BID  psyllium, 1 packet, nasogastric tube, Daily  sod phos di, mono-K phos mono, 250 mg, oral, 4x daily  thiamine, 100 mg, nasogastric tube, Daily      Continuous medications     PRN medications  PRN medications: acetaminophen **OR** acetaminophen **OR** [DISCONTINUED] acetaminophen, bisacodyl, dextrose, dextrose, glucagon, glucagon, ipratropium-albuteroL, oxygen, potassium chloride CR **OR** potassium chloride, potassium chloride CR **OR** potassium chloride, sodium phosphates   Labs  Results from last 7 days   Lab Units 11/10/24  0327 11/09/24 0344 11/08/24  0326   WBC AUTO x10*3/uL 6.0 4.5 9.2   HEMOGLOBIN g/dL 10.4* 11.3* 11.8*   HEMATOCRIT % 31.8* 35.0* 35.7*   PLATELETS AUTO x10*3/uL 70* 53* 63*     Results from last 7 days   Lab Units 11/10/24  0327 11/09/24  0344 11/08/24  0326 11/07/24  0418 11/06/24  1842 11/06/24  1021   SODIUM mmol/L 135* 137 134*   < > 133* 137   POTASSIUM mmol/L 4.2 4.5 4.2   < > 4.4 4.3   CHLORIDE mmol/L 100 101 97*   < > 97* 97*   CO2 mmol/L 32 32 34*   < > 29 36*   BUN mg/dL 11 12 8   < > 8 10   CREATININE mg/dL 0.73 0.71 0.88   < > 1.10 1.19   CALCIUM mg/dL 9.0 9.2 9.4   < > 9.9 11.1*   PROTEIN TOTAL g/dL 6.5  --   --   --  7.1 8.2   BILIRUBIN TOTAL mg/dL 0.3  --   --   --  0.3 0.2   ALK PHOS U/L 213*  --   --   --  219* 258*   ALT U/L 38  --   --   --  32 39   AST U/L 68*  --   --   --  41* 56*   GLUCOSE mg/dL 133* 147* 114*   < > 160* 79    < > = values in this interval not displayed.       ECG 12 lead    Result Date: 11/7/2024  Marked sinus bradycardia with 1st degree AV block Right axis deviation Nonspecific T wave abnormality Abnormal ECG When compared with ECG of 12-AUG-2024 05:53, OR interval has increased Vent. rate has decreased BY  82 BPM QRS axis Shifted right Minimal criteria for Anterior infarct are no longer Present  Non-specific change in ST segment in Lateral leads    XR chest 1 view    Result Date: 11/7/2024  Interpreted By:  Marvin Tejada, STUDY: XR CHEST 1 VIEW;  11/7/2024 8:23 am   INDICATION: Signs/Symptoms:Decreased breath sounds on left, reduced SpO2, concern for aspiration event.   COMPARISON: 11/06/2024   ACCESSION NUMBER(S): [id number]   ORDERING CLINICIAN: HARRY CARRILLO   FINDINGS: The patient is kyphotic and rotated which can limit evaluation. The mandible partially obscures the lung apices. Nasogastric tube terminates in the stomach.   Small lung volumes. No definite focal infiltrates or pleural effusion. Cardiomediastinal silhouette unchanged. No pulmonary vascular congestion.       No definite active disease in the chest identified on hypoventilatory exam.   MACRO: None   Signed by: Marvin Tejada 11/7/2024 10:03 AM Dictation workstation:   [id number]    XR abdomen 1 view    Result Date: 11/6/2024  Interpreted By:  Harry Chow, STUDY: XR ABDOMEN 1 VIEW;  11/6/2024 7:29 pm   INDICATION: Signs/Symptoms:NG tube placement confirmation.     COMPARISON: None.   ACCESSION NUMBER(S): [id number]   ORDERING CLINICIAN: HARRY CARRILLO   FINDINGS: There is a nasogastric tube noted ending in the left upper quadrant satisfactory position.       1.  There is a nasogastric tube noted ending in the left upper quadrant satisfactory position.   MACRO: None   Signed by: Harry Chow 11/6/2024 7:37 PM Dictation workstation:   [id number]    CT abdomen pelvis w IV contrast    Result Date: 11/6/2024  Interpreted By:  Odell Lopez, STUDY: CT ABDOMEN PELVIS W IV CONTRAST; 11/6/2024 2:31 pm   INDICATION: Signs/Symptoms:constipation, hypothermia.   COMPARISON: 10/30/2024   ACCESSION NUMBER(S): [id number]   ORDERING CLINICIAN: ALVARO GARCIA   TECHNIQUE: Contiguous axial images were obtained at 3mm slice thickness through the abdomen and pelvis following intravenous contrast administration. Coronal and sagittal  reconstructions at 3 mm slice thickness were performed. 75 ML of Omnipaque 350 was administered.   FINDINGS: LOWER CHEST: Evaluation of the visualized lung bases demonstrates right basilar airspace consolidation, may represent atelectasis and/or pneumonia. The heart is within normal limits for size.   ABDOMEN:   LIVER: The liver is within normal limits for appearance, without evidence of focal masses.   BILE DUCTS: No definite intra or extrahepatic biliary dilatation is identified.   GALLBLADDER: The gallbladder is nondilated. No definite calcified gallstones are seen.   PANCREAS: The pancreas is within normal limits for appearance, without evidence of focal masses.   SPLEEN: The spleen is within normal limits for size. No focal splenic mass is seen.   ADRENAL GLANDS: No definite adrenal nodules or masses are seen bilaterally.   KIDNEYS AND URETERS: There is no hydronephrosis, hydroureter or renal/ ureteral calculus identified bilaterally. No definite focal renal mass is seen, though evaluation is severely limited by the lack of intravenous contrast.   PELVIS:   BLADDER: The urinary bladder is grossly unremarkable for CT appearance.   REPRODUCTIVE ORGANS: The prostate is within normal limits for appearance.   BOWEL: A moderate amount of residual stool is seen throughout the colon. The colon and small bowel are within normal limits for course, caliber and appearance, without evidence of wall thickening or obstruction. The appendix is decompressed. No CT evidence of acute diverticulitis or appendicitis is seen.   VESSELS: The abdominal aorta is within normal limits for course, caliber and appearance, without evidence of aneurysm.   PERITONEUM/RETROPERITONEUM/LYMPH NODES: There is no free intraperitoneal air or free fluid identified. No gross mesenteric or retroperitoneal lymphadenopathy is identified.   BONE AND SOFT TISSUE: There is no evidence of acute fracture identified. No evidence of abdominal wall mass or  hernia is identified.       1. Moderate residual stool throughout the colon, as above. 2. No evidence of bowel obstruction, free intraperitoneal air or abnormal intra-abdominal fluid collection.   MACRO: None   Signed by: Odell Lopez 11/6/2024 2:49 PM Dictation workstation:   [id number]    XR chest 1 view    Result Date: 11/6/2024  Interpreted By:  Harry Stover, STUDY: XR CHEST 1 VIEW;  11/6/2024 11:35 am   INDICATION: Signs/Symptoms:hypothermia.     COMPARISON: 08/12/2024.   ACCESSION NUMBER(S): [id number]   ORDERING CLINICIAN: ALVARO GARCIA   FINDINGS: CARDIOMEDIASTINAL SILHOUETTE: Cardiac silhouette is stable and not significantly enlarged.   LUNGS: Inspiratory volume is low with mild bibasilar atelectasis. No definite pleural effusion. No pneumothorax is seen.   ABDOMEN: Mild gaseous distention of upper abdominal bowel is noted.   BONES: Thoracolumbar mild levocurvature may be exaggerated by positioning.       1.  Low inspiratory volume with mild bibasilar atelectasis.       MACRO: None.   Signed by: Harry Stover 11/6/2024 12:02 PM Dictation workstation:   [id number]    XR abdomen 1 view    Result Date: 10/25/2024  Interpreted By:  Marvin Tejada, STUDY: XR ABDOMEN 1 VIEW;  10/25/2024 5:14 am   INDICATION: Signs/Symptoms:constipation.   COMPARISON: 10/24/2024   ACCESSION NUMBER(S): [id number]   ORDERING CLINICIAN: CRYSTAL JOSUE   FINDINGS: Contracted upper extremities obscure the upper abdomen. Gas scattered throughout nondilated small bowel. Stool burden appears low-to-moderate.       Nonobstructive bowel gas pattern.   MACRO: None   Signed by: Marvin Tejada 10/25/2024 8:07 AM Dictation workstation:   [id number]    XR abdomen 1 view    Result Date: 10/24/2024  Interpreted By:  Marvin Tejada, STUDY: XR ABDOMEN 1 VIEW;  10/24/2024 9:11 am   INDICATION: Signs/Symptoms:Abdominal distention, assess for ileus.   COMPARISON: 08/12/2024   ACCESSION NUMBER(S): [id number]   ORDERING CLINICIAN: LINWOOD MONO    FINDINGS: Gas scattered throughout small and large bowel without significant dilatation. Free air not excluded on supine images. Upper abdomen partially obscured by upper extremities.       Nonobstructive bowel gas pattern without apparent ileus. Previous small bowel dilatation has resolved.   MACRO: None   Signed by: Marvin Tejada 10/24/2024 9:23 AM Dictation workstation:   [id number]    CT abdomen pelvis wo IV contrast    Result Date: 10/24/2024  STUDY: CT Abdomen and Pelvis without IV Contrast; 10/23/2024 at 11:36 PM INDICATION: Constipation.  History of fecal impaction. COMPARISON: CTA chest and CT abdomen and pelvis 8/9/2023, CT abdomen and pelvis 8/9/2023. ACCESSION NUMBER(S): [id number] ORDERING CLINICIAN: ISAMAR ESCOBAR TECHNIQUE: CT of the abdomen and pelvis was performed.  Contiguous axial images were obtained at 3 mm slice thickness through the abdomen and pelvis. Coronal and sagittal reconstructions at 3 mm slice thickness were performed. No intravenous contrast was administered.  Automated mA/kV exposure control was utilized and patient examination was performed in strict accordance with principles of ALARA. FINDINGS: Please note that the evaluation of vessels, lymph nodes and organs is limited without intravenous contrast.  LOWER CHEST: No cardiomegaly.  No pericardial effusion.  Lung bases are clear.  ABDOMEN and PELVIS: The liver, gallbladder and biliary tree show no concerning finding. The pancreas and spleen show no concerning finding. The adrenal glands show no concerning finding. The kidneys are normally located.  No stones or hydronephrosis.  No evidence of mass. No ureteral stones or hydroureter. The urinary bladder has a normal CT appearance. The rectum and distal colon are markedly distended with stool.  The rectum is mildly thickened.  Large amount of formed stool present throughout the remainder of the colon. Normal mesentery.  No lymphadenopathy.  No free air or fluid. The pelvic organs  show no concerning CT finding. The imaged skeleton shows no acute pathology. Small fat-containing umbilical hernia defect.    Severe constipation with fecal impaction and possible stercoral colitis. Signed by Dylon Barnett MD                    Assessment/Plan   Maximo aPblo is a 59 y.o. male on day 4 of admission presenting with Hypothermia, initial encounter.  Principal Problem:    Hypothermia, initial encounter    59-year-old male who lives in a group home secondary to cerebral palsy, MRDD, epilepsy, hypertension, CKD was admitted to ICU on 11/6/2024 due to hypothermia and hypotension.    Hypothermia, hypotension-resolved  Infection has been ruled out  Blood cultures have been negative x 3 days  Urinalysis negative on admission   Patient remains off antibiotics  Patient is back on labetalol  Will continue to hold off on clonidine, lisinopril and amlodipine for the time being    Acute metabolic encephalopathy secondary to above-clinically improving and patient is looking around now  Off restrain  Will maintain tube feeds as patient is really not working with speech therapy and not following commands and not opening his mouth  Clinically improving in terms of mental status but unable to participate in speech eval due to not following commands  Patient was on puréed diet and thickened liquid that is spoon fed by the staff  I spoke with patient's power of  Michelle at [phone] regarding evaluation for PEG tube versus comfort care approach and she would like to think till tomorrow before making a decision    Recurrent constipation  Continue with aggressive bowel regimen  Having multiple bowel movements    Epilepsy  Continue with Keppra, Trileptal    Thrombocytopenia  Off Lovenox  Continue to trend off Lovenox  Platelet count is improving    DVT prophylaxis  SCDs     Plan discussed with nursing staff at bedside.     High Level of MDM based on above issue and discussing plan    This note is created using  voice recognition software. All efforts are made to minimize errors, if there are errors there due to transcription.    Jason Pitts  [facility]

## 2024-11-10 NOTE — NURSING NOTE
Patient VSS throughout shift. Remains in NSR and on RA. TF running at goal rate of 60mL/hr with q4hr flush of 180 mL. Repositioned q2 hrs, mouth care and suctioning provided as needed. Safety maintained.

## 2024-11-10 NOTE — PROGRESS NOTES
Speech-Language Pathology    SLP Adult Inpatient  Speech-Language Pathology Treatment     Patient Name: Maximo Pablo  MRN: [id number]  Today's Date: 11/10/2024  Time Calculation  Start Time: 0844  Stop Time: 0852  Time Calculation (min): 8 min         Current Problem:   1. Hypothermia, initial encounter          Assessment:  Patient was seen for dysphagia management this date. The patient was awake upon entering the room. Oral care with a swab was attempted although the patient would not open his mouth so care was incomplete. A small coating of moderately thick liquid  was presented via spoon. The patient did not open his mouth but slightly gripped the spoon with this teeth. The patient did not visibly attempt to manipulate anything new in his mouth. No initiation of swallow was appreciated. PO trials were discontinued. ST to follow.     SLP Assessment  SLP TX Intervention Outcome: No Progress Made  Prognosis: Fair  Treatment Tolerance: Other (Comment) (Nonparticipatory)  Medical Staff Made Aware: Yes  Education Provided: No    Recommendations:  Solid Diet Recommendations: NPO   Liquid Diet Recommendations: NPO   - Complete oral care frequently throughout the day  Medicine Administration: Non oral    Interventions:  Therapeutic Swallow Intervention : PO Trials        Baseline Assessment:  Oxygen: room air  Caregiver Report: Oral unable to be completed by RN    Subjective:  Patient was awake upon entering room. Nonverbal at baseline.     Plan: Skilled speech therapy for dysphagia treatment is warranted in order to provide training and instruction regarding the use of compensatory swallow strategies, oropharyngeal strengthening exercises, and pt/caregiver education in order to reduce risk of aspiration, dehydration and malnutrition.  Plan  Inpatient/Swing Bed or Outpatient: Inpatient  SLP TX Plan: Continue Plan of Care  SLP Plan: Skilled SLP  SLP Frequency: 2x per week  Duration: 2 weeks  SLP Discharge  Recommendations: Continue skilled SLP services at the next level of care  Next Treatment Priority: assess for baseline PO  Discussed POC: Patient, Caregiver/family  Discussed Risks/Benefits: Nursing  Patient/Caregiver Agreeable: Other (Comment) (unable to state agreement)     Goals: Established 11/8/24  Patient will:  Patient/Caregiver will demonstrate knowledge of taught compensatory strategies and dietary consistency recommendations to optimize functional swallow function without overt clinical signs and symptoms of aspiration or dysphagia  Goal Status: Not progressing toward goal  Goal Progress: Progressing as follows   -Patient unable to actively participate in the session    Pain:  Pain Assessment  Pain Assessment: PAINAD (Pain Assessment in Advanced Dementia Scale)  Unable to Self-Report Pain Reason: Nonverbal     Education:  Learner:  Caregiver  Barriers to Learning: None  Method: Verbal  Education - Topic: ST provided caregiver education regarding clinical impressions and plan of care. RN verbalized full comprehension. Education will be reinforced.   Outcome:  Meets goals/outcomes

## 2024-11-11 LAB
ANION GAP SERPL CALC-SCNC: 7 MMOL/L (ref 10–20)
BASOPHILS # BLD AUTO: 0.01 X10*3/UL (ref 0–0.1)
BASOPHILS NFR BLD AUTO: 0.1 %
BUN SERPL-MCNC: 12 MG/DL (ref 6–23)
CALCIUM SERPL-MCNC: 9 MG/DL (ref 8.6–10.3)
CHLORIDE SERPL-SCNC: 101 MMOL/L (ref 98–107)
CO2 SERPL-SCNC: 31 MMOL/L (ref 21–32)
CORTIS F SERPL-MCNC: 1.51 UG/DL
CREAT SERPL-MCNC: 0.71 MG/DL (ref 0.5–1.3)
EGFRCR SERPLBLD CKD-EPI 2021: >90 ML/MIN/1.73M*2
EOSINOPHIL # BLD AUTO: 0.23 X10*3/UL (ref 0–0.7)
EOSINOPHIL NFR BLD AUTO: 3.1 %
ERYTHROCYTE [DISTWIDTH] IN BLOOD BY AUTOMATED COUNT: 16.2 % (ref 11.5–14.5)
GLUCOSE BLD MANUAL STRIP-MCNC: 109 MG/DL (ref 74–99)
GLUCOSE BLD MANUAL STRIP-MCNC: 112 MG/DL (ref 74–99)
GLUCOSE BLD MANUAL STRIP-MCNC: 131 MG/DL (ref 74–99)
GLUCOSE BLD MANUAL STRIP-MCNC: 145 MG/DL (ref 74–99)
GLUCOSE BLD MANUAL STRIP-MCNC: 156 MG/DL (ref 74–99)
GLUCOSE SERPL-MCNC: 138 MG/DL (ref 74–99)
HCT VFR BLD AUTO: 30.5 % (ref 41–52)
HGB BLD-MCNC: 9.7 G/DL (ref 13.5–17.5)
IMM GRANULOCYTES # BLD AUTO: 0.02 X10*3/UL (ref 0–0.7)
IMM GRANULOCYTES NFR BLD AUTO: 0.3 % (ref 0–0.9)
LYMPHOCYTES # BLD AUTO: 1.8 X10*3/UL (ref 1.2–4.8)
LYMPHOCYTES NFR BLD AUTO: 23.9 %
MCH RBC QN AUTO: 25.9 PG (ref 26–34)
MCHC RBC AUTO-ENTMCNC: 31.8 G/DL (ref 32–36)
MCV RBC AUTO: 82 FL (ref 80–100)
MONOCYTES # BLD AUTO: 0.69 X10*3/UL (ref 0.1–1)
MONOCYTES NFR BLD AUTO: 9.2 %
NEUTROPHILS # BLD AUTO: 4.79 X10*3/UL (ref 1.2–7.7)
NEUTROPHILS NFR BLD AUTO: 63.4 %
NRBC BLD-RTO: 0 /100 WBCS (ref 0–0)
PHOSPHATE SERPL-MCNC: 2.3 MG/DL (ref 2.5–4.9)
PLATELET # BLD AUTO: 108 X10*3/UL (ref 150–450)
POTASSIUM SERPL-SCNC: 4.2 MMOL/L (ref 3.5–5.3)
RBC # BLD AUTO: 3.74 X10*6/UL (ref 4.5–5.9)
SODIUM SERPL-SCNC: 135 MMOL/L (ref 136–145)
WBC # BLD AUTO: 7.5 X10*3/UL (ref 4.4–11.3)

## 2024-11-11 PROCEDURE — 99239 HOSP IP/OBS DSCHRG MGMT >30: CPT | Performed by: INTERNAL MEDICINE

## 2024-11-11 PROCEDURE — 51701 INSERT BLADDER CATHETER: CPT

## 2024-11-11 PROCEDURE — 2500000001 HC RX 250 WO HCPCS SELF ADMINISTERED DRUGS (ALT 637 FOR MEDICARE OP): Performed by: INTERNAL MEDICINE

## 2024-11-11 PROCEDURE — 84100 ASSAY OF PHOSPHORUS: CPT | Performed by: INTERNAL MEDICINE

## 2024-11-11 PROCEDURE — 82947 ASSAY GLUCOSE BLOOD QUANT: CPT

## 2024-11-11 PROCEDURE — 80048 BASIC METABOLIC PNL TOTAL CA: CPT | Performed by: INTERNAL MEDICINE

## 2024-11-11 PROCEDURE — 99232 SBSQ HOSP IP/OBS MODERATE 35: CPT | Performed by: INTERNAL MEDICINE

## 2024-11-11 PROCEDURE — 36415 COLL VENOUS BLD VENIPUNCTURE: CPT | Performed by: INTERNAL MEDICINE

## 2024-11-11 PROCEDURE — 2500000004 HC RX 250 GENERAL PHARMACY W/ HCPCS (ALT 636 FOR OP/ED): Performed by: INTERNAL MEDICINE

## 2024-11-11 PROCEDURE — 85025 COMPLETE CBC W/AUTO DIFF WBC: CPT | Performed by: INTERNAL MEDICINE

## 2024-11-11 PROCEDURE — 1100000001 HC PRIVATE ROOM DAILY

## 2024-11-11 RX ORDER — LORAZEPAM 0.5 MG/1
0.5 TABLET ORAL EVERY 4 HOURS PRN
Status: DISCONTINUED | OUTPATIENT
Start: 2024-11-11 | End: 2024-11-13 | Stop reason: HOSPADM

## 2024-11-11 RX ORDER — HYOSCYAMINE SULFATE 0.12 MG/1
0.12 TABLET, ORALLY DISINTEGRATING ORAL EVERY 4 HOURS PRN
Status: DISCONTINUED | OUTPATIENT
Start: 2024-11-11 | End: 2024-11-13 | Stop reason: HOSPADM

## 2024-11-11 ASSESSMENT — PAIN SCALES - PAIN ASSESSMENT IN ADVANCED DEMENTIA (PAINAD)
CONSOLABILITY: NO NEED TO CONSOLE
BODYLANGUAGE: RELAXED
BREATHING: NORMAL
BREATHING: NORMAL
CONSOLABILITY: NO NEED TO CONSOLE
BODYLANGUAGE: RELAXED
FACIALEXPRESSION: SMILING OR INEXPRESSIVE
TOTALSCORE: 0
FACIALEXPRESSION: SMILING OR INEXPRESSIVE
TOTALSCORE: 0

## 2024-11-11 ASSESSMENT — COGNITIVE AND FUNCTIONAL STATUS - GENERAL
HELP NEEDED FOR BATHING: TOTAL
STANDING UP FROM CHAIR USING ARMS: TOTAL
WALKING IN HOSPITAL ROOM: TOTAL
DRESSING REGULAR UPPER BODY CLOTHING: TOTAL
MOVING FROM LYING ON BACK TO SITTING ON SIDE OF FLAT BED WITH BEDRAILS: TOTAL
TURNING FROM BACK TO SIDE WHILE IN FLAT BAD: TOTAL
HELP NEEDED FOR BATHING: TOTAL
MOVING TO AND FROM BED TO CHAIR: TOTAL
DAILY ACTIVITIY SCORE: 6
TURNING FROM BACK TO SIDE WHILE IN FLAT BAD: TOTAL
STANDING UP FROM CHAIR USING ARMS: TOTAL
CLIMB 3 TO 5 STEPS WITH RAILING: TOTAL
CLIMB 3 TO 5 STEPS WITH RAILING: TOTAL
DRESSING REGULAR LOWER BODY CLOTHING: TOTAL
DAILY ACTIVITIY SCORE: 6
WALKING IN HOSPITAL ROOM: TOTAL
DRESSING REGULAR LOWER BODY CLOTHING: TOTAL
MOVING FROM LYING ON BACK TO SITTING ON SIDE OF FLAT BED WITH BEDRAILS: TOTAL
DRESSING REGULAR UPPER BODY CLOTHING: TOTAL
TOILETING: TOTAL
MOBILITY SCORE: 6
EATING MEALS: TOTAL
PERSONAL GROOMING: TOTAL
PERSONAL GROOMING: TOTAL
MOBILITY SCORE: 6
MOVING TO AND FROM BED TO CHAIR: TOTAL
TOILETING: TOTAL
EATING MEALS: TOTAL

## 2024-11-11 ASSESSMENT — PAIN SCALES - WONG BAKER: WONGBAKER_NUMERICALRESPONSE: NO HURT

## 2024-11-11 NOTE — CARE PLAN
Problem: Safety - Medical Restraint  Goal: Remains free of injury from restraints (Restraint for Interference with Medical Device)  Outcome: Met  Goal: Free from restraint(s) (Restraint for Interference with Medical Device)  Outcome: Met     Problem: Indwelling Catheter Maintenance  Goal: I will have no complications from indwelling catheter  Outcome: Met   The patient's goals for the shift include      The clinical goals for the shift include Discharge with hospice    Over the shift, the patient did not make progress toward the following goals. Barriers to progression include . Recommendations to address these barriers include .    Problem: Chronic Conditions and Co-morbidities  Goal: Patient's chronic conditions and co-morbidity symptoms are monitored and maintained or improved  Outcome: Not Progressing

## 2024-11-11 NOTE — CARE PLAN
Problem: Chronic Conditions and Co-morbidities  Goal: Patient's chronic conditions and co-morbidity symptoms are monitored and maintained or improved  Outcome: Not Progressing     Problem: Pain - Adult  Goal: Verbalizes/displays adequate comfort level or baseline comfort level  Outcome: Progressing     Problem: Discharge Planning  Goal: Discharge to home or other facility with appropriate resources  Outcome: Progressing     Problem: Skin  Goal: Decreased wound size/increased tissue granulation at next dressing change  Outcome: Progressing  Goal: Prevent/manage excess moisture  Outcome: Progressing  Goal: Prevent/minimize sheer/friction injuries  Outcome: Progressing  Goal: Promote/optimize nutrition  Outcome: Progressing  Flowsheets (Taken 11/11/2024 1413)  Promote/optimize nutrition: Discuss with provider if NPO > 2 days  Goal: Promote skin healing  Outcome: Progressing     Problem: Fall/Injury  Goal: Not fall by end of shift  Outcome: Progressing  Goal: Be free from injury by end of the shift  Outcome: Progressing     Problem: Safety - Adult  Goal: Free from fall injury  Outcome: Progressing   The patient's goals for the shift include      The clinical goals for the shift include Discharge with hospice    Over the shift, the patient did not make progress toward the following goals. Barriers to progression include . Recommendations to address these barriers include .    Problem: Chronic Conditions and Co-morbidities  Goal: Patient's chronic conditions and co-morbidity symptoms are monitored and maintained or improved  Outcome: Not Progressing

## 2024-11-11 NOTE — PROGRESS NOTES
11/11/24 1122   Discharge Planning   Who is requesting discharge planning? Provider   Home or Post Acute Services In home services   Type of Home Care Services Hospice   Expected Discharge Disposition Home   Does the patient need discharge transport arranged? Yes   RoundTrip coordination needed? Yes   Has discharge transport been arranged? No     Patient is going to be going back to [facility] with [facility] to follow. Patient's nurse and care giver at [facility] was notified of decision to return to home with hospice. She along with other staff of [facility] will be coming to  patient. This [facility] did also notify ADRIAN Martinez of patient returning to [facility] today with staff from home. CT team to follow     1215- [facility] will not be taking patient back to home. Requesting [facility] evaluate patient here for possible inpatient hospice.

## 2024-11-11 NOTE — DISCHARGE SUMMARY
Discharge Diagnosis  Hypothermia, initial encounter  Failure to thrive  Unable to follow commands and resume oral diet  Hospice   Issues Requiring Follow-Up  follow-up with primary care provider as needed And with hospice    Discharge Meds     Medication List      CONTINUE taking these medications     aspirin 81 mg chewable tablet   atropine 1 % ophthalmic solution   levETIRAcetam 500 mg tablet; Commonly known as: Keppra   * OXcarbazepine 150 mg tablet; Commonly known as: Trileptal   * OXcarbazepine 300 mg tablet; Commonly known as: Trileptal  * This list has 2 medication(s) that are the same as other medications   prescribed for you. Read the directions carefully, and ask your doctor or   other care provider to review them with you.     STOP taking these medications     acetaminophen 325 mg tablet; Commonly known as: Tylenol   amLODIPine 10 mg tablet; Commonly known as: Norvasc   bisacodyl 10 mg suppository; Commonly known as: Dulcolax   chlorhexidine 0.12 % solution; Commonly known as: Peridex   cholecalciferol 25 MCG (1000 UT) tablet; Commonly known as: Vitamin D-3   cloNIDine 0.2 mg tablet; Commonly known as: Catapres   CORICIDIN HBP MAX COLD-FLU NT ORAL   Dupixent Pen 300 mg/2 mL pen injector; Generic drug: dupilumab   Fleet Enema 19-7 gram/118 mL enema enema; Generic drug: sodium   phosphates   labetalol 100 mg tablet; Commonly known as: Normodyne   lisinopril 20 mg tablet   multivitamin with minerals tablet   omeprazole 20 mg DR capsule; Commonly known as: PriLOSEC   polyethylene glycol 17 gram/dose powder; Commonly known as: Glycolax,   Miralax   psyllium 3.4 gram packet; Commonly known as: Metamucil   sennosides-docusate sodium 8.6-50 mg tablet; Commonly known as:   Katty-Colace       Test Results Pending At Discharge  Pending Labs       No current pending labs.            Hospital Course  59-year-old male who lives in a group home secondary to cerebral palsy, MRDD, epilepsy, hypertension, CKD was admitted  to ICU on 11/6/2024 due to hypothermia and hypotension.  Patient was admitted and initially thought to be sepsis related and was started on antibiotics with a body warmer.  Antibiotics were discontinued as the cultures remain negative and blood pressure medications were on hold.  Patient had NG tube for feeding and received tube feeds.  Continue to work with therapy specifically speech but he would not follow any commands and not opening his mouth for feeds.  I spoke with patient's healthcare power of  Michelle on Sunday and talked about all the options including the feeding tube and she wanted 24 hours to think about and speak with the rest of the family before making decisions.  Today I spoke with her and decided that we will remove the NG tube and send patient back to group home with hospice with comfort feeding as he allows.  I think this might be the best decision given patient does occasionally use his right upper extremity and has pulled out NG tube here already and he could potentially do the same thing with PEG tube and risk for adverse outcome.  Unfortunately patient is unable to get accommodated at group home when they came and evaluated him at bedside.  They want patient going to hospice home for a few days before they were to get him back into the location     34 minutes spent in discharge timing    Pertinent Physical Exam At Time of Discharge  General: Sleepy on room air to smile.  HEENT: PERRLA, head is tilted to the side and drooling  Neck: Normal to inspection  Lungs: Clear to auscultation, work of breathing within normal limit  Cardiac: Regular rate and rhythm  Abdomen: Soft nontender, positive bowel sounds  :  deferred  Skin: Intact  Hematology: No petechia or excessive ecchymosis  Musculoskeletal: Contracted  Neurological: Unable to assess as not following any command.  Does smile periodically.  His left upper extremity is contracted also does not really follow any commands and not opening  his mouth  Psych: Pleasant and smiling    Outpatient Follow-Up  Future Appointments   Date Time Provider Department Center   2/28/2025 11:10 AM Keturah Nathan DO [id number] [place]         Jason Pitts MD

## 2024-11-11 NOTE — HOSPITAL COURSE
59-year-old male who lives in a group home secondary to cerebral palsy, MRDD, epilepsy, hypertension, CKD was admitted to ICU on 11/6/2024 due to hypothermia and hypotension.  Patient was admitted and initially thought to be sepsis related and was started on antibiotics with a body warmer.  Antibiotics were discontinued as the cultures remain negative and blood pressure medications were on hold.  Patient had NG tube for feeding and received tube feeds.  Continue to work with therapy specifically speech but he would not follow any commands and not opening his mouth for feeds.  I spoke with patient's healthcare power of  Michelle on Sunday and talked about all the options including the feeding tube and she wanted 24 hours to think about and speak with the rest of the family before making decisions.  Today I spoke with her and decided that we will remove the NG tube and send patient back to group home with hospice with comfort feeding as he allows.  I think this might be the best decision given patient does occasionally use his right upper extremity and has pulled out NG tube here already and he could potentially do the same thing with PEG tube and risk for adverse outcome.  Patient will be discharged back to group home and his CODE STATUS is changed to DNR CC with hospice evaluation in the hospital.  While waiting for this patient had NG tube removed and he was intermittently eating with nursing staff.  They can continue with that and will only continue with essential medications.  Family met with palliative care and hospice on 11/12/2024 and had just decided to get him with hospice.  Patient will be going back with group home and hospice was provided the resources and possible equipment if needed.    34 minutes spent in discharge timing

## 2024-11-11 NOTE — CONSULTS
Reason For Consult  Hospice    History Of Present Illness  Maximo Pablo is a 59 y.o. male presenting with past medical history of cerebral palsy, MRDD, nonverbal at baseline, epilepsy, HTN, CKD stage II, SBO who was admitted 11/6 for hypothermia. He was initially admitted to ICU for pressor support. Has since been downgraded and remains stable. Tube feeds were started, however pt unable to pass a swallow evaluation due to inability to follow commands. Goals of care were discussed with pts CHERRI Martinez over the weekend and this morning she decided that she would like pt to return to the group home with comfort care and hospice instead of PEG placement.      Past Medical History  He has a past medical history of Epilepsy, unspecified, not intractable, without status epilepticus, Hyperlipidemia, unspecified, Personal history of other diseases of the circulatory system, Personal history of other diseases of the nervous system and sense organs, Personal history of other endocrine, nutritional and metabolic disease, and Personal history of other specified conditions.       Assessment/Plan     Spoke with Michelle and confirmed that she would like to move forward with hospice. Also spoke with Nurse from [facility] and they prefer hospice of the [facility]. Referral placed to [facility] with plan for pt to return to group home with hospice. Group  would like pt evaluated by hospice team here at [facility] before discharge back to facility. Notified [facility] to arrange RN visit for pt to coordinate discharge    [facility] notified.         Flakita Ruiz RN

## 2024-11-11 NOTE — NURSING NOTE
Patient VSS throughout shift. Remains in NSR and on RA. TF running at goal rate of 60mL/hr with q4hr 180 mL flushes. Oral care and suctioning provided as needed. Safety maintained.

## 2024-11-11 NOTE — NURSING NOTE
Nurse and caregiver here to see patient, they state he is much less responsive than he usually is. State he can express yes/no, point to things that he wants, was wheeling himself in wheelchair and fed himself. Nurse spoke to her supervisor, put her on speaker so she could ask me questions about his status. She stated that they cannot take him back in this condition and recommended that [facility] see him for inpatient hospice status

## 2024-11-12 LAB
GLUCOSE BLD MANUAL STRIP-MCNC: 107 MG/DL (ref 74–99)
GLUCOSE BLD MANUAL STRIP-MCNC: 124 MG/DL (ref 74–99)

## 2024-11-12 PROCEDURE — 1100000001 HC PRIVATE ROOM DAILY

## 2024-11-12 PROCEDURE — 99232 SBSQ HOSP IP/OBS MODERATE 35: CPT | Performed by: INTERNAL MEDICINE

## 2024-11-12 PROCEDURE — 82947 ASSAY GLUCOSE BLOOD QUANT: CPT

## 2024-11-12 PROCEDURE — 92526 ORAL FUNCTION THERAPY: CPT | Mod: GN | Performed by: STUDENT IN AN ORGANIZED HEALTH CARE EDUCATION/TRAINING PROGRAM

## 2024-11-12 PROCEDURE — 2500000001 HC RX 250 WO HCPCS SELF ADMINISTERED DRUGS (ALT 637 FOR MEDICARE OP): Performed by: INTERNAL MEDICINE

## 2024-11-12 PROCEDURE — 2500000002 HC RX 250 W HCPCS SELF ADMINISTERED DRUGS (ALT 637 FOR MEDICARE OP, ALT 636 FOR OP/ED): Performed by: INTERNAL MEDICINE

## 2024-11-12 RX ORDER — LEVETIRACETAM 500 MG/1
500 TABLET ORAL 2 TIMES DAILY
Status: DISCONTINUED | OUTPATIENT
Start: 2024-11-12 | End: 2024-11-13 | Stop reason: HOSPADM

## 2024-11-12 RX ORDER — OXCARBAZEPINE 300 MG/1
300 TABLET, FILM COATED ORAL 2 TIMES DAILY
Status: DISCONTINUED | OUTPATIENT
Start: 2024-11-12 | End: 2024-11-13 | Stop reason: HOSPADM

## 2024-11-12 RX ORDER — OXCARBAZEPINE 150 MG/1
150 TABLET, FILM COATED ORAL 2 TIMES DAILY
Status: DISCONTINUED | OUTPATIENT
Start: 2024-11-12 | End: 2024-11-13 | Stop reason: HOSPADM

## 2024-11-12 RX ORDER — NAPROXEN SODIUM 220 MG/1
81 TABLET, FILM COATED ORAL
Status: DISCONTINUED | OUTPATIENT
Start: 2024-11-12 | End: 2024-11-13 | Stop reason: HOSPADM

## 2024-11-12 ASSESSMENT — PAIN SCALES - PAIN ASSESSMENT IN ADVANCED DEMENTIA (PAINAD)
TOTALSCORE: 0
CONSOLABILITY: NO NEED TO CONSOLE
BODYLANGUAGE: RELAXED
BREATHING: NORMAL
FACIALEXPRESSION: SMILING OR INEXPRESSIVE

## 2024-11-12 ASSESSMENT — COGNITIVE AND FUNCTIONAL STATUS - GENERAL
HELP NEEDED FOR BATHING: TOTAL
MOBILITY SCORE: 6
DRESSING REGULAR LOWER BODY CLOTHING: TOTAL
MOVING FROM LYING ON BACK TO SITTING ON SIDE OF FLAT BED WITH BEDRAILS: TOTAL
PERSONAL GROOMING: TOTAL
TURNING FROM BACK TO SIDE WHILE IN FLAT BAD: TOTAL
PERSONAL GROOMING: TOTAL
MOVING TO AND FROM BED TO CHAIR: TOTAL
DAILY ACTIVITIY SCORE: 6
CLIMB 3 TO 5 STEPS WITH RAILING: TOTAL
CLIMB 3 TO 5 STEPS WITH RAILING: TOTAL
STANDING UP FROM CHAIR USING ARMS: TOTAL
DRESSING REGULAR UPPER BODY CLOTHING: TOTAL
DAILY ACTIVITIY SCORE: 6
STANDING UP FROM CHAIR USING ARMS: TOTAL
TURNING FROM BACK TO SIDE WHILE IN FLAT BAD: TOTAL
TOILETING: TOTAL
WALKING IN HOSPITAL ROOM: TOTAL
DRESSING REGULAR LOWER BODY CLOTHING: TOTAL
MOBILITY SCORE: 6
HELP NEEDED FOR BATHING: TOTAL
MOVING TO AND FROM BED TO CHAIR: TOTAL
TOILETING: TOTAL
MOVING FROM LYING ON BACK TO SITTING ON SIDE OF FLAT BED WITH BEDRAILS: TOTAL
DRESSING REGULAR UPPER BODY CLOTHING: TOTAL
WALKING IN HOSPITAL ROOM: TOTAL
EATING MEALS: TOTAL
EATING MEALS: TOTAL

## 2024-11-12 ASSESSMENT — PAIN - FUNCTIONAL ASSESSMENT: PAIN_FUNCTIONAL_ASSESSMENT: WONG-BAKER FACES

## 2024-11-12 ASSESSMENT — PAIN SCALES - GENERAL
PAINLEVEL_OUTOF10: 0 - NO PAIN
PAINLEVEL_OUTOF10: 0 - NO PAIN

## 2024-11-12 NOTE — PROGRESS NOTES
Speech-Language Pathology                 Therapy Communication Note    Patient Name: Maximo Pablo  MRN: [id number]  Department: [facility] 3 N  Room: [facility]/3034-A  Today's Date: 11/11/2024     Discipline: Speech Language Pathology    Missed Visit Reason: Attempted to see patient for dysphagia therapy. RN indicates ST is not indicated this date as discharge with hospice is planned. Patient to D/C to group home; NG tube and both IV's removed.   Per MD note this date, ADRIAN Martinez has decided to remove the NG and send patient back to group home with hospice and comfort feeding as patient allows. Patient has pulled out NG tube here already, and he could potentially do the same thing with PEG tube with risk for adverse outcome. Unfortunately, group home cannot accommodate patient, and recommend [facility] evaluate patient here for possible inpatient hospice. Patient to continue hospital stay. Recommend follow up ST visit to determine patient's ability to participate in PO trials. RN aware.     Missed Time: 6821

## 2024-11-12 NOTE — PROGRESS NOTES
Speech-Language Pathology                 Therapy Communication Note    Patient Name: Maximo Pablo  MRN: [id number]  Department: [facility] 3 N  Room: [facility]-A  Today's Date: 11/12/2024     Discipline: Speech Language Pathology    Missed Visit Reason: Attempted to see patient for dysphagia therapy. RN indicates ST is not indicated at this time as the plan is for D/C to group home with hospice and comfort feeds. However, the group home cannot accept patient given his medical status; and requests [facility] evaluate patient here for possible inpatient hospice. Patient will continue hospital stay as a result. Recommend continued ST to assess for patient ability to participate in/optimize PO trials. RN aware.      Missed Time: 4432

## 2024-11-12 NOTE — PROGRESS NOTES
Speech-Language Pathology                 Therapy Communication Note    Patient Name: Maximo Pablo  MRN: [id number]  Department: [facility] 3 N  Room: [facility]4/3034-A  Today's Date: 11/11/2024     Discipline: Speech Language Pathology    Missed Visit Reason: Attempted to see patient for dysphagia therapy this date. MICAH Schilling reports ST is not indicated at this time as discharge with Hospice is planned. Per Palliative Medicine note this date, goals of care were discussed with patient's HPOA Michelle, and she decided she would like pt to return to the group home with comfort care and hospice instead of PEG placement. Per MD note this date, it was decided NG tube would be removed. Patient unable to get accommodated at group home as they want patient going to hospice home for a few days before they were to get him back into the location. ST services this date, then, were not delivered. RN aware.       Missed Time: 8791

## 2024-11-12 NOTE — PROGRESS NOTES
Speech-Language Pathology    SLP Adult Inpatient  Speech-Language Pathology Treatment     Patient Name: Maximo Pablo  MRN: [id number]  Today's Date: 11/12/2024  Time Calculation  Start Time: 1330  Stop Time: 1343  Time Calculation (min): 13 min       Current Problem:   1. Hypothermia, initial encounter  Referral to Primary Care - Family Practice        Assessment:  Patient was seen for dysphagia management this date. Since the last ST visit, the patient has pulled out his NG tube and a PEG was not recommended as this would also pose the same risk. A diet order was placed however the order was for mildly thick liquid instead of moderately thick liquid that was recommended from an MBSS completed 8/5/24. During lunch meal, the patient was observed to have moderate oral phase dysphagia congruent with that observed during the August MBSS. Anterior loss of saliva and bolus observed with every trial. Audible swallow noted. The patient was seen with both mildly thick and moderately thick liquids. No overt s/s of aspiration was observed with either consistency however the thicker consistency may be easier for the patient due to oral phase impairments and frequent anterior escape of oral contents. Given that the patient has returned to a baseline diet, that alternative nutrition is not a consideration and the plan is for the patient to discharge with hospice, ST will discontinue the current order. If new concerns arise, please feel free to re-consult.     SLP Assessment  SLP TX Intervention Outcome: Making Progress Towards Goals  Prognosis: Poor  Treatment Tolerance: Patient tolerated treatment well  Medical Staff Made Aware: Yes  Education Provided: Yes    Recommendations:  Solid Diet Recommendations: Pureed/extremely thick  (IDDSI Level 4)   Liquid Diet Recommendations: Honey thick/liquidised/moderately thick (IDDS Level 3)   - Small bites  - Alternate consistencies  - Upright for all PO intake  - Liquids from spoon  only  - Medications crushed in puree (verify with MD)  - Full supervision with meals; One to one assist with meals    Interventions:  Therapeutic Swallow Intervention : PO Trials, Caregiver Education        Baseline Assessment:  Oxygen: Room air    Subjective:  Patient was subtly able to indicate request for more food. He was much more participatory for today's treatment.     Plan: Skilled speech therapy for dysphagia treatment is warranted in order to provide training and instruction regarding the use of compensatory swallow strategies, oropharyngeal strengthening exercises, and pt/caregiver education in order to reduce risk of aspiration, dehydration and malnutrition.  Plan  Inpatient/Swing Bed or Outpatient: Inpatient  SLP TX Plan: Discharge from Speech Therapy  SLP Plan: No skilled SLP  No Skilled SLP: At baseline function, No Functional Gain Expected  SLP Frequency: 2x per week  Duration: 2 weeks  SLP Discharge Recommendations: Continue skilled SLP services at the next level of care  Next Treatment Priority: assess for baseline PO  Discussed POC: Patient, Caregiver/family  Discussed Risks/Benefits: Yes  Patient/Caregiver Agreeable: Yes  SLP - OK to Discharge: Yes     Goals: Established 11/8/24  Patient will:  Patient/Caregiver will demonstrate knowledge of taught compensatory strategies and dietary consistency recommendations to optimize functional swallow function without overt clinical signs and symptoms of aspiration or dysphagia  Goal Status: Goal met   -Patient has pulled out NG tube and resumed an oral diet. Current diet orders updated to reflect recommendations from the 8/5/24 MBSS. Patient to discharge with hospice and alternative nutrition was not a consideration.      Pain:  Pain Assessment  Pain Assessment: Lai-Baker FACES  0-10 (Numeric) Pain Score: 0 - No pain  Unable to Self-Report Pain Reason: Nonverbal     Education:  Learner:  Patient  Barriers to Learning: Cognitive limitations ,  Communication limitations  Method: Verbal  Education - Topic: ST provided patient education regarding role of ST, purpose of assessment, clinical impressions, goals of treatment, and plan of care. Patient verbalized full comprehension, consistent with cognitive status. Education will be reinforced. ST further coordinated with RN regarding recommendations and precautions per this assessment, with RN verbalizing understanding.  Outcome:  Unable to meet by patient, RN confirmed understanding

## 2024-11-12 NOTE — PROGRESS NOTES
Maximo Pablo is a 59 y.o. male on day 6 of admission presenting with Hypothermia, initial encounter.      Subjective   Awake and was fed by the nursing staff and had 75% of his food.  Spoke with patient's cousin Michelle and updated her       Objective     Last Recorded Vitals  /79 (BP Location: Right arm, Patient Position: Lying)   Pulse 90   Temp 37.3 °C (99.1 °F) (Temporal)   Resp 18   Wt 75.3 kg (166 lb)   SpO2 96%   Intake/Output last 3 Shifts:    Intake/Output Summary (Last 24 hours) at 11/12/2024 1131  Last data filed at 11/12/2024 0840  Gross per 24 hour   Intake 240 ml   Output 950 ml   Net -710 ml       Admission Weight  Weight: 68 kg (150 lb) (11/06/24 0912)    Daily Weight  11/12/24 : 75.3 kg (166 lb)      Physical Exam:  General: Alert awake on room air.  No NG tube   HEENT: PERRLA, head intact and normocephalic  Neck: Normal to inspection  Lungs: Clear to auscultation, work of breathing within normal limit  Cardiac: Regular rate and rhythm  Abdomen: Soft nontender, positive bowel sounds  : Exam deferred  Skin: Intact  Hematology: No petechia or excessive ecchymosis  Musculoskeletal: Without significant trauma  Neurological: awake and looking around.  Contracted  Psych: Smiling and interacting and using his right upper extremity    Relevant Results  Scheduled medications  aspirin, 81 mg, oral, Daily  levETIRAcetam, 500 mg, oral, BID  OXcarbazepine, 150 mg, oral, BID  OXcarbazepine, 300 mg, oral, BID      Continuous medications     PRN medications  PRN medications: acetaminophen **OR** acetaminophen **OR** [DISCONTINUED] acetaminophen, bisacodyl, dextrose, dextrose, glucagon, glucagon, hyoscyamine, ipratropium-albuteroL, LORazepam, oxygen, sodium phosphates   Labs  Results from last 7 days   Lab Units 11/11/24  0341 11/10/24  0327 11/09/24  0344   WBC AUTO x10*3/uL 7.5 6.0 4.5   HEMOGLOBIN g/dL 9.7* 10.4* 11.3*   HEMATOCRIT % 30.5* 31.8* 35.0*   PLATELETS AUTO x10*3/uL 108* 70* 53*      Results from last 7 days   Lab Units 11/11/24  0341 11/10/24  0327 11/09/24  0344 11/07/24  0418 11/06/24  1842 11/06/24  1021   SODIUM mmol/L 135* 135* 137   < > 133* 137   POTASSIUM mmol/L 4.2 4.2 4.5   < > 4.4 4.3   CHLORIDE mmol/L 101 100 101   < > 97* 97*   CO2 mmol/L 31 32 32   < > 29 36*   BUN mg/dL 12 11 12   < > 8 10   CREATININE mg/dL 0.71 0.73 0.71   < > 1.10 1.19   CALCIUM mg/dL 9.0 9.0 9.2   < > 9.9 11.1*   PROTEIN TOTAL g/dL  --  6.5  --   --  7.1 8.2   BILIRUBIN TOTAL mg/dL  --  0.3  --   --  0.3 0.2   ALK PHOS U/L  --  213*  --   --  219* 258*   ALT U/L  --  38  --   --  32 39   AST U/L  --  68*  --   --  41* 56*   GLUCOSE mg/dL 138* 133* 147*   < > 160* 79    < > = values in this interval not displayed.       ECG 12 lead    Result Date: 11/9/2024  Marked sinus bradycardia with 1st degree AV block Right axis deviation Nonspecific T wave abnormality Abnormal ECG When compared with ECG of 12-AUG-2024 05:53, NJ interval has increased Vent. rate has decreased BY  82 BPM QRS axis Shifted right Minimal criteria for Anterior infarct are no longer Present Confirmed by Brad Stevens ([id number]) on 11/9/2024 6:43:32 PM    XR chest 1 view    Result Date: 11/7/2024  Interpreted By:  Marvin Tejada, STUDY: XR CHEST 1 VIEW;  11/7/2024 8:23 am   INDICATION: Signs/Symptoms:Decreased breath sounds on left, reduced SpO2, concern for aspiration event.   COMPARISON: 11/06/2024   ACCESSION NUMBER(S): [id number]   ORDERING CLINICIAN: HARRY CARRILLO   FINDINGS: The patient is kyphotic and rotated which can limit evaluation. The mandible partially obscures the lung apices. Nasogastric tube terminates in the stomach.   Small lung volumes. No definite focal infiltrates or pleural effusion. Cardiomediastinal silhouette unchanged. No pulmonary vascular congestion.       No definite active disease in the chest identified on hypoventilatory exam.   MACRO: None   Signed by: Marvin Tejada 11/7/2024 10:03 AM Dictation  workstation:   [id number]    XR abdomen 1 view    Result Date: 11/6/2024  Interpreted By:  Harry Chow, STUDY: XR ABDOMEN 1 VIEW;  11/6/2024 7:29 pm   INDICATION: Signs/Symptoms:NG tube placement confirmation.     COMPARISON: None.   ACCESSION NUMBER(S): [id number]   ORDERING CLINICIAN: HARRY CARRILLO   FINDINGS: There is a nasogastric tube noted ending in the left upper quadrant satisfactory position.       1.  There is a nasogastric tube noted ending in the left upper quadrant satisfactory position.   MACRO: None   Signed by: Harry Chow 11/6/2024 7:37 PM Dictation workstation:   [id number]    CT abdomen pelvis w IV contrast    Result Date: 11/6/2024  Interpreted By:  Odell Lopez, STUDY: CT ABDOMEN PELVIS W IV CONTRAST; 11/6/2024 2:31 pm   INDICATION: Signs/Symptoms:constipation, hypothermia.   COMPARISON: 10/30/2024   ACCESSION NUMBER(S): [id number]   ORDERING CLINICIAN: ALVARO GARCIA   TECHNIQUE: Contiguous axial images were obtained at 3mm slice thickness through the abdomen and pelvis following intravenous contrast administration. Coronal and sagittal reconstructions at 3 mm slice thickness were performed. 75 ML of Omnipaque 350 was administered.   FINDINGS: LOWER CHEST: Evaluation of the visualized lung bases demonstrates right basilar airspace consolidation, may represent atelectasis and/or pneumonia. The heart is within normal limits for size.   ABDOMEN:   LIVER: The liver is within normal limits for appearance, without evidence of focal masses.   BILE DUCTS: No definite intra or extrahepatic biliary dilatation is identified.   GALLBLADDER: The gallbladder is nondilated. No definite calcified gallstones are seen.   PANCREAS: The pancreas is within normal limits for appearance, without evidence of focal masses.   SPLEEN: The spleen is within normal limits for size. No focal splenic mass is seen.   ADRENAL GLANDS: No definite adrenal nodules or masses are seen bilaterally.   KIDNEYS AND  URETERS: There is no hydronephrosis, hydroureter or renal/ ureteral calculus identified bilaterally. No definite focal renal mass is seen, though evaluation is severely limited by the lack of intravenous contrast.   PELVIS:   BLADDER: The urinary bladder is grossly unremarkable for CT appearance.   REPRODUCTIVE ORGANS: The prostate is within normal limits for appearance.   BOWEL: A moderate amount of residual stool is seen throughout the colon. The colon and small bowel are within normal limits for course, caliber and appearance, without evidence of wall thickening or obstruction. The appendix is decompressed. No CT evidence of acute diverticulitis or appendicitis is seen.   VESSELS: The abdominal aorta is within normal limits for course, caliber and appearance, without evidence of aneurysm.   PERITONEUM/RETROPERITONEUM/LYMPH NODES: There is no free intraperitoneal air or free fluid identified. No gross mesenteric or retroperitoneal lymphadenopathy is identified.   BONE AND SOFT TISSUE: There is no evidence of acute fracture identified. No evidence of abdominal wall mass or hernia is identified.       1. Moderate residual stool throughout the colon, as above. 2. No evidence of bowel obstruction, free intraperitoneal air or abnormal intra-abdominal fluid collection.   MACRO: None   Signed by: Odell Lopez 11/6/2024 2:49 PM Dictation workstation:   [id number]    XR chest 1 view    Result Date: 11/6/2024  Interpreted By:  Harry Stover, STUDY: XR CHEST 1 VIEW;  11/6/2024 11:35 am   INDICATION: Signs/Symptoms:hypothermia.     COMPARISON: 08/12/2024.   ACCESSION NUMBER(S): [id number]   ORDERING CLINICIAN: ALVARO GARCIA   FINDINGS: CARDIOMEDIASTINAL SILHOUETTE: Cardiac silhouette is stable and not significantly enlarged.   LUNGS: Inspiratory volume is low with mild bibasilar atelectasis. No definite pleural effusion. No pneumothorax is seen.   ABDOMEN: Mild gaseous distention of upper abdominal bowel is noted.    BONES: Thoracolumbar mild levocurvature may be exaggerated by positioning.       1.  Low inspiratory volume with mild bibasilar atelectasis.       MACRO: None.   Signed by: Harry Stover 11/6/2024 12:02 PM Dictation workstation:   [id number]    XR abdomen 1 view    Result Date: 10/25/2024  Interpreted By:  Marvin Tejada, STUDY: XR ABDOMEN 1 VIEW;  10/25/2024 5:14 am   INDICATION: Signs/Symptoms:constipation.   COMPARISON: 10/24/2024   ACCESSION NUMBER(S): [id number]   ORDERING CLINICIAN: CRYSTAL JOSUE   FINDINGS: Contracted upper extremities obscure the upper abdomen. Gas scattered throughout nondilated small bowel. Stool burden appears low-to-moderate.       Nonobstructive bowel gas pattern.   MACRO: None   Signed by: Marvin Tejada 10/25/2024 8:07 AM Dictation workstation:   [id number]    XR abdomen 1 view    Result Date: 10/24/2024  Interpreted By:  Marvin Tejada, STUDY: XR ABDOMEN 1 VIEW;  10/24/2024 9:11 am   INDICATION: Signs/Symptoms:Abdominal distention, assess for ileus.   COMPARISON: 08/12/2024   ACCESSION NUMBER(S): [id number]   ORDERING CLINICIAN: LINWOOD MOON   FINDINGS: Gas scattered throughout small and large bowel without significant dilatation. Free air not excluded on supine images. Upper abdomen partially obscured by upper extremities.       Nonobstructive bowel gas pattern without apparent ileus. Previous small bowel dilatation has resolved.   MACRO: None   Signed by: Marvin Tejada 10/24/2024 9:23 AM Dictation workstation:   [id number]    CT abdomen pelvis wo IV contrast    Result Date: 10/24/2024  STUDY: CT Abdomen and Pelvis without IV Contrast; 10/23/2024 at 11:36 PM INDICATION: Constipation.  History of fecal impaction. COMPARISON: CTA chest and CT abdomen and pelvis 8/9/2023, CT abdomen and pelvis 8/9/2023. ACCESSION NUMBER(S): [id number] ORDERING CLINICIAN: ISAMAR ESCOBAR TECHNIQUE: CT of the abdomen and pelvis was performed.  Contiguous axial images were obtained at 3 mm slice  thickness through the abdomen and pelvis. Coronal and sagittal reconstructions at 3 mm slice thickness were performed. No intravenous contrast was administered.  Automated mA/kV exposure control was utilized and patient examination was performed in strict accordance with principles of ALARA. FINDINGS: Please note that the evaluation of vessels, lymph nodes and organs is limited without intravenous contrast.  LOWER CHEST: No cardiomegaly.  No pericardial effusion.  Lung bases are clear.  ABDOMEN and PELVIS: The liver, gallbladder and biliary tree show no concerning finding. The pancreas and spleen show no concerning finding. The adrenal glands show no concerning finding. The kidneys are normally located.  No stones or hydronephrosis.  No evidence of mass. No ureteral stones or hydroureter. The urinary bladder has a normal CT appearance. The rectum and distal colon are markedly distended with stool.  The rectum is mildly thickened.  Large amount of formed stool present throughout the remainder of the colon. Normal mesentery.  No lymphadenopathy.  No free air or fluid. The pelvic organs show no concerning CT finding. The imaged skeleton shows no acute pathology. Small fat-containing umbilical hernia defect.    Severe constipation with fecal impaction and possible stercoral colitis. Signed by Dylon Barnett MD                    Assessment/Plan   Maximo Pablo is a 59 y.o. male on day 6 of admission presenting with Hypothermia, initial encounter.  Principal Problem:    Hypothermia, initial encounter  Failure to thrive  Not participating in speech therapy  Seizure disorder  MRDD  Hypothermia  Hypotension  Recurrent constipation  Epilepsy  Thrombocytopenia    Plan:  Spoke with patient's cousin and updated her about improvement in status but this could be transient and he does not participate routinely and believe hospice and palliative care is still right  She agrees  Meeting at 5:30 PM  CODE STATUS is DNR  CC  Recurrently having similar issues multiple times and I believe still probably the right decision  Will determine the disposition plan once hospice meeting takes place with family     Plan discussed with nursing staff and patient's medical power of  Michelle over the phone at [phone]    Moderate level of MDM based on above issue and discussing plan    This note is created using voice recognition software. All efforts are made to minimize errors, if there are errors there due to transcription.    Jason Pitts  Hospitalist

## 2024-11-12 NOTE — CARE PLAN
Problem: Pain - Adult  Goal: Verbalizes/displays adequate comfort level or baseline comfort level  Outcome: Progressing     Problem: Discharge Planning  Goal: Discharge to home or other facility with appropriate resources  Outcome: Progressing     Problem: Chronic Conditions and Co-morbidities  Goal: Patient's chronic conditions and co-morbidity symptoms are monitored and maintained or improved  Outcome: Progressing     Problem: Safety - Adult  Goal: Free from fall injury  Outcome: Progressing     Problem: Skin  Goal: Decreased wound size/increased tissue granulation at next dressing change  Outcome: Progressing  Flowsheets (Taken 11/12/2024 0534)  Decreased wound size/increased tissue granulation at next dressing change: Promote sleep for wound healing  Goal: Participates in plan/prevention/treatment measures  Outcome: Progressing  Goal: Prevent/manage excess moisture  Outcome: Progressing  Goal: Prevent/minimize sheer/friction injuries  Outcome: Progressing  Goal: Promote/optimize nutrition  Outcome: Progressing  Goal: Promote skin healing  Outcome: Progressing     Problem: Fall/Injury  Goal: Not fall by end of shift  Outcome: Progressing  Goal: Be free from injury by end of the shift  Outcome: Progressing  Goal: Verbalize understanding of personal risk factors for fall in the hospital  Outcome: Progressing  Goal: Use assistive devices by end of the shift  Outcome: Progressing  Goal: Pace activities to prevent fatigue by end of the shift  Outcome: Progressing       The clinical goals for the shift include Pt will remain HDS throughout the shift

## 2024-11-12 NOTE — PROGRESS NOTES
"Nutrition Follow Up Assessment:   Nutrition Assessment       Nutrition Note:  Maximo Pablo is a 59 y.o. male presenting 11/6 with hypothermia (89F). Work up revealing hypovolemic shock which was improved with fluids and levophed (off levo 11/16).  Scan revealing moderate stool throughout colon and small ventral hernia. Staff attempted swallow evaluation 11/6 however pt held food in mouth; pt presently NPO; NGT placed for medications as well as to start EN. ST attempted evaluation, 11/7 however pt would not fully participate.     11/12/24 Follow up note: Pt w/hospice meeting planned for later today. Per chart notes, plan for no PEG and NGT was removed 11/11. SLP on consult w/recommendation for NPO; however, allowed baseline diet for \"comfort feeds\" of pureed solids and moderately thick liquids. Per meal documentation: 75% B today.     Past Medical History  10/24-10/25/2024 [facility] with serocoral colitis  9/9-9/14/2024 [facility] with constipation  8/12-8/16/2024 [facility] r/o intestinal obstruction and +UA.  [facility] ED 8/8/2024 due to r/o SBO; pt with +BM in ED and sent back to group home.    has a past medical history of Epilepsy, unspecified, not intractable, without status epilepticus, Hyperlipidemia, unspecified, Personal history of other diseases of the circulatory system, Personal history of other diseases of the nervous system and sense organs, Personal history of other endocrine, nutritional and metabolic disease, and Personal history of other specified conditions. dysphagia  Surgical History   has a past surgical history that includes Other surgical history (11/26/2019).       Nutrition History:  Food and Nutrient History: 11/7: Pt from group home; pt nonverbal at baseline. Called facility and spoke with caregiver (Aminah, [phone]); pt there on regular/puree level 4/moderately thick (HONEY) liquids all via spoon, and double portions for dinner . Pt usually able to feed self with right hand utlilizing " "adaptive equipment including a built up utensil. Diet there supplemented with [place] Instant Breakfast 3x/day. Per Aminah, pt usually eats well however pt with poor appetite ~1 week with reported no lunch/dinner 11/5 and minimal breakfast 11/6. NKFA. Note meds crushed in pudding or yogurt per archives.  Vitamin/Herbal Supplement Use: home meds include dulcolax, D3, MVI, miralax, metamucil, senna, fleets enema.  Food Allergies/Intolerances:  None  GI Symptoms: Constipation  Oral Problems: Chewing difficulty and Swallowing difficultybaseline food/fluids as puree level 4 with moderately thick (HONEY) liquids.        Anthropometrics:  Height: 162.6 cm (5' 4\")   Weight: 75.3 kg (166 lb)   BMI (Calculated): 28.48   IBW: 59.1kg          Weight History:   Group home weights as follows:  11/2024: 72.6kg  10/2024: 73.5kg  8/2024: 74.5kg  7/2024: 77.2kg  Weight Change %: no significant weight change per group home record.    0-10 (Numeric) Pain Score: 0 - No pain  PAINAD Score:  [0]      Nutrition Focused Physical Exam Findings:  11/7/2024  Subcutaneous Fat Loss:   Orbital Fat Pads: Well nourished (slightly bulging fat pads)  Buccal Fat Pads: Well nourished (full, rounded cheeks)  Triceps: Defer  Ribs: Defer  Muscle Wasting:  Temporalis: Well nourished (well-defined muscle)  Pectoralis (Clavicular Region): Well nourished (clavicle not visible)  Deltoid/Trapezius: Well nourished (rounded appearance at arm, shoulder, neck)  Interosseous: Well nourished (muscle bulges)  Trapezius/Infraspinatus/Supraspinatus (Scapular Region): Defer  Quadriceps: Defer  Gastrocnemius: Defer  Edema:  Edema: +1 trace, +2 mild  Edema Location: 2+ generalized, 1+ BUE, 1+ BLE  Physical Findings:  Skin: Negative (warm, dry, bruising)    Nutrition Significant Labs:  BMP Trend:   Results from last 7 days   Lab Units 11/11/24  0341 11/10/24  0327 11/09/24  0344 11/08/24  0326   GLUCOSE mg/dL 138* 133* 147* 114*   CALCIUM mg/dL 9.0 9.0 9.2 9.4   SODIUM " "mmol/L 135* 135* 137 134*   POTASSIUM mmol/L 4.2 4.2 4.5 4.2   CO2 mmol/L 31 32 32 34*   CHLORIDE mmol/L 101 100 101 97*   BUN mg/dL 12 11 12 8   CREATININE mg/dL 0.71 0.73 0.71 0.88    , A1C:  Lab Results   Component Value Date    HGBA1C 5.6 09/19/2024   , BG POCT trend:   Results from last 7 days   Lab Units 11/12/24  0542 11/12/24  0055 11/11/24 2024 11/11/24  1657 11/11/24  1142   POCT GLUCOSE mg/dL 124* 107* 109* 112* 156*    , Liver Function Trend:   Results from last 7 days   Lab Units 11/10/24  0327 11/06/24  1842 11/06/24  1021   ALK PHOS U/L 213* 219* 258*   AST U/L 68* 41* 56*   ALT U/L 38 32 39   BILIRUBIN TOTAL mg/dL 0.3 0.3 0.2    , Vit D:   Lab Results   Component Value Date    VITD25 63 09/19/2024    , Vit B12: No results found for: \"TEWMRAHO98\" , Folate: No results found for: \"FOLATE\"     Nutrition Specific Medications:  Scheduled medications  aspirin, 81 mg, oral, Daily  levETIRAcetam, 500 mg, oral, BID  OXcarbazepine, 150 mg, oral, BID  OXcarbazepine, 300 mg, oral, BID      Continuous medications       PRN medications  PRN medications: acetaminophen **OR** acetaminophen **OR** [DISCONTINUED] acetaminophen, bisacodyl, dextrose, dextrose, glucagon, glucagon, hyoscyamine, ipratropium-albuteroL, LORazepam, oxygen, sodium phosphates     I/O:   Last BM Date: 11/12/24; Stool Appearance: Loose, Watery (11/12/24 0530)    Dietary Orders (From admission, onward)       Start     Ordered    11/12/24 1350  Adult diet Regular; Pureed 4; Moderately thick 3  Diet effective now        Comments: For pleasure/comfort feeding   Question Answer Comment   Diet type Regular    Texture Pureed 4    Fluid consistency Moderately thick 3        11/12/24 1349    11/06/24 2032  May Not Participate in Room Service  ( ROOM SERVICE MAY NOT PARTICIPATE)  Once        Question:  .  Answer:  Yes    11/06/24 2031                     Estimated Needs:   Total Energy Estimated Needs (kCal):  (1900-2200kcal (25-29kcal/kg of 75kg))   "   Total Protein Estimated Needs (g):  (83-100g (1.1-1.3g/kg of 75kg))     Total Fluid Estimated Needs (mL):  (1mL/kcal/d or as per physician)           Nutrition Diagnosis   Malnutrition Diagnosis  Patient has Malnutrition Diagnosis: No    Nutrition Diagnosis  Patient has Nutrition Diagnosis: Yes  Diagnosis Status (1): Ongoing  Nutrition Diagnosis 1: Increased nutrient needs  Related to (1): acute metabolic stress  As Evidenced by (1): progressive decline in oral intakes ~5-7 days with resulting hypovolemic shock.  Additional Assessment Information (1): 11/7: Case discussed during CCM rounds. NGT in place for meds and plan for ST eval today.  Additional Nutrition Diagnosis: Diagnosis 2  Diagnosis Status (2): Resolved  Nutrition Diagnosis 2: Altered GI function  Related to (2): recurrent constipation  As Evidenced by (2): + constipation this admit as well as 3 other admissions for constipation since 8/2024.       Nutrition Interventions/Recommendations         Nutrition Prescription:  Individualized Nutrition Prescription Provided for : Diet: continue oral diet as ordered for comfort feeds: (Adult diet Regular; Pureed 4; Moderately thick 3)        Nutrition Interventions:   Interventions: Meals and snacks  Meals and Snacks: Texture-modified diet  Goal: will consume >75% of meals  Enteral Intake: Other (Comment)  Goal: no enteral nutrition as plan for hospice         Nutrition Education:   11/7: Pt not appropriate for education; from group home.        Nutrition Monitoring and Evaluation   Food/Nutrient Related History Monitoring  Monitoring and Evaluation Plan: Energy intake  Energy Intake: Estimated energy intake  Criteria: will meet >75% of estimated energy needs w/meals    Body Composition/Growth/Weight History  Monitoring and Evaluation Plan: Weight  Weight: Measured weight  Criteria: maintain stable wt    Biochemical Data, Medical Tests and Procedures  Monitoring and Evaluation Plan: Electrolyte/renal  panel  Electrolyte and Renal Panel: Sodium, Potassium, Phosphorus, Magnesium  Criteria: WNR              Time Spent (min): 30 minutes   Follow up 5-7 days  Last RD note 11/12/24

## 2024-11-12 NOTE — NURSING NOTE
Report given to Rakesh Kirby on 3N. Pt transferred to 3N, pt stable, no complaints of pain at this time.

## 2024-11-12 NOTE — NURSING NOTE
[facility] RN to assess pt today at 530p bedside for return to group home with hospice care v [facility] stay.    Hospice consents signed, pt does not meet criteria for [facility]. Group Home has accepted pt back on hospice care and plan for [facility] RN to see pt tomorrow and arrange transport back to the group home.    Will follow

## 2024-11-13 VITALS
RESPIRATION RATE: 18 BRPM | WEIGHT: 164.2 LBS | OXYGEN SATURATION: 95 % | BODY MASS INDEX: 28.03 KG/M2 | DIASTOLIC BLOOD PRESSURE: 61 MMHG | TEMPERATURE: 98.8 F | SYSTOLIC BLOOD PRESSURE: 136 MMHG | HEIGHT: 64 IN | HEART RATE: 64 BPM

## 2024-11-13 PROCEDURE — 2500000002 HC RX 250 W HCPCS SELF ADMINISTERED DRUGS (ALT 637 FOR MEDICARE OP, ALT 636 FOR OP/ED): Performed by: INTERNAL MEDICINE

## 2024-11-13 PROCEDURE — 99239 HOSP IP/OBS DSCHRG MGMT >30: CPT | Performed by: INTERNAL MEDICINE

## 2024-11-13 PROCEDURE — 2500000001 HC RX 250 WO HCPCS SELF ADMINISTERED DRUGS (ALT 637 FOR MEDICARE OP): Performed by: INTERNAL MEDICINE

## 2024-11-13 ASSESSMENT — PAIN - FUNCTIONAL ASSESSMENT: PAIN_FUNCTIONAL_ASSESSMENT: WONG-BAKER FACES

## 2024-11-13 ASSESSMENT — COGNITIVE AND FUNCTIONAL STATUS - GENERAL
DRESSING REGULAR UPPER BODY CLOTHING: TOTAL
MOVING FROM LYING ON BACK TO SITTING ON SIDE OF FLAT BED WITH BEDRAILS: TOTAL
MOVING TO AND FROM BED TO CHAIR: TOTAL
MOBILITY SCORE: 6
DRESSING REGULAR LOWER BODY CLOTHING: TOTAL
EATING MEALS: TOTAL
DAILY ACTIVITIY SCORE: 6
CLIMB 3 TO 5 STEPS WITH RAILING: TOTAL
TOILETING: TOTAL
HELP NEEDED FOR BATHING: TOTAL
PERSONAL GROOMING: TOTAL
STANDING UP FROM CHAIR USING ARMS: TOTAL
TURNING FROM BACK TO SIDE WHILE IN FLAT BAD: TOTAL
WALKING IN HOSPITAL ROOM: TOTAL

## 2024-11-13 ASSESSMENT — PAIN SCALES - GENERAL
PAINLEVEL_OUTOF10: 0 - NO PAIN
PAINLEVEL_OUTOF10: 0 - NO PAIN

## 2024-11-13 NOTE — NURSING NOTE
3030 ASHISH Pablo  Met with shireen Martinez/ ARLENE at patient's bedside. Reviewed hospice services, philosophy, and plan of care. In agreement and consents were signed. Discussed discharge planning options- patient is not a candidate for [facility] at this time. Case was reviewed with H[facility] supervisor- no symptom management needs, no PRNs given, also no  home with no one financially able to help. During meeting, group home had called hospital floor and stated they could accept back. Michelle in agreement and aware of plan. Provided H[facility] contact number.     Plan to dc back to group [facility] tomorrow once H[facility] can confirm needs  H[facility] to continue to follow    Update provided to:  Dory Villagomez, MICAH Ruiz, ZIYAD Dixon, H[facility] RN  [phone]

## 2024-11-13 NOTE — PROGRESS NOTES
Maximo Pablo is a 59 y.o. male on day 7 of admission presenting with Hypothermia, initial encounter.      Subjective   Doing well but still not eating.  Spoke with patient's power of  and she wants to proceed with comfort care with palliative care and hospice consult       Objective     Last Recorded Vitals  /59 (BP Location: Right arm, Patient Position: Lying)   Pulse 96   Temp 36 °C (96.8 °F) (Temporal)   Resp 18   Wt 74.5 kg (164 lb 3.2 oz)   SpO2 94%   Intake/Output last 3 Shifts:    Intake/Output Summary (Last 24 hours) at 11/13/2024 0847  Last data filed at 11/13/2024 0627  Gross per 24 hour   Intake 720 ml   Output 700 ml   Net 20 ml       Admission Weight  Weight: 68 kg (150 lb) (11/06/24 0912)    Daily Weight  11/13/24 : 74.5 kg (164 lb 3.2 oz)      Physical Exam:  General: Alert awake on room air.  With NG tube in place  HEENT: PERRLA, head intact and normocephalic  Neck: Normal to inspection  Lungs: Clear to auscultation, work of breathing within normal limit  Cardiac: Regular rate and rhythm  Abdomen: Soft nontender, positive bowel sounds  : Exam deferred  Skin: Intact  Hematology: No petechia or excessive ecchymosis  Musculoskeletal: Without significant trauma  Neurological: awake and looking around.  Contracted  Psych: Smiling and interacting and using his right upper extremity    Relevant Results  Scheduled medications  aspirin, 81 mg, oral, Daily  levETIRAcetam, 500 mg, oral, BID  OXcarbazepine, 150 mg, oral, BID  OXcarbazepine, 300 mg, oral, BID      Continuous medications     PRN medications  PRN medications: acetaminophen **OR** acetaminophen **OR** [DISCONTINUED] acetaminophen, bisacodyl, dextrose, dextrose, glucagon, glucagon, hyoscyamine, ipratropium-albuteroL, LORazepam, oxygen, sodium phosphates   Labs  Results from last 7 days   Lab Units 11/11/24  0341 11/10/24  0327 11/09/24  0344   WBC AUTO x10*3/uL 7.5 6.0 4.5   HEMOGLOBIN g/dL 9.7* 10.4* 11.3*   HEMATOCRIT %  30.5* 31.8* 35.0*   PLATELETS AUTO x10*3/uL 108* 70* 53*     Results from last 7 days   Lab Units 11/11/24  0341 11/10/24  0327 11/09/24  0344 11/07/24  0418 11/06/24  1842 11/06/24  1021   SODIUM mmol/L 135* 135* 137   < > 133* 137   POTASSIUM mmol/L 4.2 4.2 4.5   < > 4.4 4.3   CHLORIDE mmol/L 101 100 101   < > 97* 97*   CO2 mmol/L 31 32 32   < > 29 36*   BUN mg/dL 12 11 12   < > 8 10   CREATININE mg/dL 0.71 0.73 0.71   < > 1.10 1.19   CALCIUM mg/dL 9.0 9.0 9.2   < > 9.9 11.1*   PROTEIN TOTAL g/dL  --  6.5  --   --  7.1 8.2   BILIRUBIN TOTAL mg/dL  --  0.3  --   --  0.3 0.2   ALK PHOS U/L  --  213*  --   --  219* 258*   ALT U/L  --  38  --   --  32 39   AST U/L  --  68*  --   --  41* 56*   GLUCOSE mg/dL 138* 133* 147*   < > 160* 79    < > = values in this interval not displayed.       ECG 12 lead    Result Date: 11/9/2024  Marked sinus bradycardia with 1st degree AV block Right axis deviation Nonspecific T wave abnormality Abnormal ECG When compared with ECG of 12-AUG-2024 05:53, RI interval has increased Vent. rate has decreased BY  82 BPM QRS axis Shifted right Minimal criteria for Anterior infarct are no longer Present Confirmed by Brad Stevens ([id number]) on 11/9/2024 6:43:32 PM    XR chest 1 view    Result Date: 11/7/2024  Interpreted By:  Marvin Tejada, STUDY: XR CHEST 1 VIEW;  11/7/2024 8:23 am   INDICATION: Signs/Symptoms:Decreased breath sounds on left, reduced SpO2, concern for aspiration event.   COMPARISON: 11/06/2024   ACCESSION NUMBER(S): [id number]   ORDERING CLINICIAN: HARRY CARRILLO   FINDINGS: The patient is kyphotic and rotated which can limit evaluation. The mandible partially obscures the lung apices. Nasogastric tube terminates in the stomach.   Small lung volumes. No definite focal infiltrates or pleural effusion. Cardiomediastinal silhouette unchanged. No pulmonary vascular congestion.       No definite active disease in the chest identified on hypoventilatory exam.   MACRO: None    Signed by: Marvin Tejada 11/7/2024 10:03 AM Dictation workstation:   [id number]    XR abdomen 1 view    Result Date: 11/6/2024  Interpreted By:  Harry Chow, STUDY: XR ABDOMEN 1 VIEW;  11/6/2024 7:29 pm   INDICATION: Signs/Symptoms:NG tube placement confirmation.     COMPARISON: None.   ACCESSION NUMBER(S): [id number]   ORDERING CLINICIAN: HARRY CARRILLO   FINDINGS: There is a nasogastric tube noted ending in the left upper quadrant satisfactory position.       1.  There is a nasogastric tube noted ending in the left upper quadrant satisfactory position.   MACRO: None   Signed by: Harry Chow 11/6/2024 7:37 PM Dictation workstation:   [id number]    CT abdomen pelvis w IV contrast    Result Date: 11/6/2024  Interpreted By:  Odell Lopez, STUDY: CT ABDOMEN PELVIS W IV CONTRAST; 11/6/2024 2:31 pm   INDICATION: Signs/Symptoms:constipation, hypothermia.   COMPARISON: 10/30/2024   ACCESSION NUMBER(S): [id number]   ORDERING CLINICIAN: ALVARO GARCIA   TECHNIQUE: Contiguous axial images were obtained at 3mm slice thickness through the abdomen and pelvis following intravenous contrast administration. Coronal and sagittal reconstructions at 3 mm slice thickness were performed. 75 ML of Omnipaque 350 was administered.   FINDINGS: LOWER CHEST: Evaluation of the visualized lung bases demonstrates right basilar airspace consolidation, may represent atelectasis and/or pneumonia. The heart is within normal limits for size.   ABDOMEN:   LIVER: The liver is within normal limits for appearance, without evidence of focal masses.   BILE DUCTS: No definite intra or extrahepatic biliary dilatation is identified.   GALLBLADDER: The gallbladder is nondilated. No definite calcified gallstones are seen.   PANCREAS: The pancreas is within normal limits for appearance, without evidence of focal masses.   SPLEEN: The spleen is within normal limits for size. No focal splenic mass is seen.   ADRENAL GLANDS: No definite adrenal  nodules or masses are seen bilaterally.   KIDNEYS AND URETERS: There is no hydronephrosis, hydroureter or renal/ ureteral calculus identified bilaterally. No definite focal renal mass is seen, though evaluation is severely limited by the lack of intravenous contrast.   PELVIS:   BLADDER: The urinary bladder is grossly unremarkable for CT appearance.   REPRODUCTIVE ORGANS: The prostate is within normal limits for appearance.   BOWEL: A moderate amount of residual stool is seen throughout the colon. The colon and small bowel are within normal limits for course, caliber and appearance, without evidence of wall thickening or obstruction. The appendix is decompressed. No CT evidence of acute diverticulitis or appendicitis is seen.   VESSELS: The abdominal aorta is within normal limits for course, caliber and appearance, without evidence of aneurysm.   PERITONEUM/RETROPERITONEUM/LYMPH NODES: There is no free intraperitoneal air or free fluid identified. No gross mesenteric or retroperitoneal lymphadenopathy is identified.   BONE AND SOFT TISSUE: There is no evidence of acute fracture identified. No evidence of abdominal wall mass or hernia is identified.       1. Moderate residual stool throughout the colon, as above. 2. No evidence of bowel obstruction, free intraperitoneal air or abnormal intra-abdominal fluid collection.   MACRO: None   Signed by: Odell Lopez 11/6/2024 2:49 PM Dictation workstation:   [id number]    XR chest 1 view    Result Date: 11/6/2024  Interpreted By:  Harry Stover, STUDY: XR CHEST 1 VIEW;  11/6/2024 11:35 am   INDICATION: Signs/Symptoms:hypothermia.     COMPARISON: 08/12/2024.   ACCESSION NUMBER(S): [id number]   ORDERING CLINICIAN: ALVARO GARCIA   FINDINGS: CARDIOMEDIASTINAL SILHOUETTE: Cardiac silhouette is stable and not significantly enlarged.   LUNGS: Inspiratory volume is low with mild bibasilar atelectasis. No definite pleural effusion. No pneumothorax is seen.   ABDOMEN: Mild  gaseous distention of upper abdominal bowel is noted.   BONES: Thoracolumbar mild levocurvature may be exaggerated by positioning.       1.  Low inspiratory volume with mild bibasilar atelectasis.       MACRO: None.   Signed by: Harry Stover 11/6/2024 12:02 PM Dictation workstation:   [id number]    XR abdomen 1 view    Result Date: 10/25/2024  Interpreted By:  Marvin Tejada, STUDY: XR ABDOMEN 1 VIEW;  10/25/2024 5:14 am   INDICATION: Signs/Symptoms:constipation.   COMPARISON: 10/24/2024   ACCESSION NUMBER(S): [id number]   ORDERING CLINICIAN: CRYSTAL JOSUE   FINDINGS: Contracted upper extremities obscure the upper abdomen. Gas scattered throughout nondilated small bowel. Stool burden appears low-to-moderate.       Nonobstructive bowel gas pattern.   MACRO: None   Signed by: Marvin Tejada 10/25/2024 8:07 AM Dictation workstation:   [id number]    XR abdomen 1 view    Result Date: 10/24/2024  Interpreted By:  Marvin Tejada, STUDY: XR ABDOMEN 1 VIEW;  10/24/2024 9:11 am   INDICATION: Signs/Symptoms:Abdominal distention, assess for ileus.   COMPARISON: 08/12/2024   ACCESSION NUMBER(S): [id number]   ORDERING CLINICIAN: LINWOOD MOON   FINDINGS: Gas scattered throughout small and large bowel without significant dilatation. Free air not excluded on supine images. Upper abdomen partially obscured by upper extremities.       Nonobstructive bowel gas pattern without apparent ileus. Previous small bowel dilatation has resolved.   MACRO: None   Signed by: Marvin Tejada 10/24/2024 9:23 AM Dictation workstation:   [id number]    CT abdomen pelvis wo IV contrast    Result Date: 10/24/2024  STUDY: CT Abdomen and Pelvis without IV Contrast; 10/23/2024 at 11:36 PM INDICATION: Constipation.  History of fecal impaction. COMPARISON: CTA chest and CT abdomen and pelvis 8/9/2023, CT abdomen and pelvis 8/9/2023. ACCESSION NUMBER(S): [id number] ORDERING CLINICIAN: ISAMAR ESCOBAR TECHNIQUE: CT of the abdomen and pelvis was performed.   Contiguous axial images were obtained at 3 mm slice thickness through the abdomen and pelvis. Coronal and sagittal reconstructions at 3 mm slice thickness were performed. No intravenous contrast was administered.  Automated mA/kV exposure control was utilized and patient examination was performed in strict accordance with principles of ALARA. FINDINGS: Please note that the evaluation of vessels, lymph nodes and organs is limited without intravenous contrast.  LOWER CHEST: No cardiomegaly.  No pericardial effusion.  Lung bases are clear.  ABDOMEN and PELVIS: The liver, gallbladder and biliary tree show no concerning finding. The pancreas and spleen show no concerning finding. The adrenal glands show no concerning finding. The kidneys are normally located.  No stones or hydronephrosis.  No evidence of mass. No ureteral stones or hydroureter. The urinary bladder has a normal CT appearance. The rectum and distal colon are markedly distended with stool.  The rectum is mildly thickened.  Large amount of formed stool present throughout the remainder of the colon. Normal mesentery.  No lymphadenopathy.  No free air or fluid. The pelvic organs show no concerning CT finding. The imaged skeleton shows no acute pathology. Small fat-containing umbilical hernia defect.    Severe constipation with fecal impaction and possible stercoral colitis. Signed by Dylon Barnett MD                    Assessment/Plan   Maximo Pablo is a 59 y.o. male on day 7 of admission presenting with Hypothermia, initial encounter.  Principal Problem:    Hypothermia, initial encounter  Failure to thrive  Not participating in speech therapy  Seizure disorder  MRDD  Hypothermia  Hypotension  Recurrent constipation  Epilepsy  Thrombocytopenia    Plan:  Spoke with family and they want to change the CODE STATUS to DNR CC and keep him comfortable and get him back to group home or hospice and see if he will eat  Only continuing seizure  medications  Recurrently having similar issues multiple times and I believe still probably the right decision  Will determine the disposition plan once hospice meeting takes place with family  Can move out of CCU to regular floor     Plan discussed with nursing staff and patient's medical power of  Michelle over the phone at [phone]    Moderate level of MDM based on above issue and discussing plan    This note is created using voice recognition software. All efforts are made to minimize errors, if there are errors there due to transcription.    Jason Pitts  Hospitalist

## 2024-11-13 NOTE — PROGRESS NOTES
[facility] [place] to meet with patient today at 1100 to establish discharge plan.     1310  Hospice nurse, Angela, working with Aminah from group home to get patient back to group home with hospice. Goal of return today with hospice.    1411  1500 pickup back to group home arranged by [facility]

## 2024-11-13 NOTE — CARE PLAN
Problem: Pain - Adult  Goal: Verbalizes/displays adequate comfort level or baseline comfort level  Outcome: Progressing     Problem: Discharge Planning  Goal: Discharge to home or other facility with appropriate resources  Outcome: Progressing     Problem: Chronic Conditions and Co-morbidities  Goal: Patient's chronic conditions and co-morbidity symptoms are monitored and maintained or improved  Outcome: Progressing     Problem: Safety - Adult  Goal: Free from fall injury  Outcome: Progressing     Problem: Skin  Goal: Decreased wound size/increased tissue granulation at next dressing change  Outcome: Progressing  Goal: Participates in plan/prevention/treatment measures  Outcome: Progressing  Goal: Prevent/manage excess moisture  Outcome: Progressing  Goal: Prevent/minimize sheer/friction injuries  Outcome: Progressing  Goal: Promote/optimize nutrition  Outcome: Progressing  Goal: Promote skin healing  Outcome: Progressing       The clinical goals for the shift include Pt to remain HDS throughout the shift

## 2024-11-13 NOTE — NURSING NOTE
Discharge Note Pt's tele and PIV have been removed prior to discharge to Group Home with hospice. [facility] has called report to group home and all belongings and pt's records will be transported with physicians ambulance.

## 2024-11-26 ENCOUNTER — LAB REQUISITION (OUTPATIENT)
Dept: LAB | Facility: HOSPITAL | Age: 59
End: 2024-11-26
Payer: MEDICAID

## 2024-11-26 DIAGNOSIS — L20.89 OTHER ATOPIC DERMATITIS: ICD-10-CM

## 2024-11-26 DIAGNOSIS — Z79.899 OTHER LONG TERM (CURRENT) DRUG THERAPY: ICD-10-CM

## 2024-11-26 PROCEDURE — 86481 TB AG RESPONSE T-CELL SUSP: CPT | Mod: OUT | Performed by: INTERNAL MEDICINE

## 2024-11-28 LAB
NIL(NEG) CONTROL SPOT COUNT: NORMAL
PANEL A SPOT COUNT: 0
PANEL B SPOT COUNT: 0
POS CONTROL SPOT COUNT: NORMAL
T-SPOT. TB INTERPRETATION: NEGATIVE

## 2024-11-29 ENCOUNTER — SPECIALTY PHARMACY (OUTPATIENT)
Dept: PHARMACY | Facility: CLINIC | Age: 59
End: 2024-11-29

## 2024-11-29 DIAGNOSIS — L20.89 OTHER ATOPIC DERMATITIS: ICD-10-CM

## 2024-12-02 RX ORDER — DUPILUMAB 300 MG/2ML
INJECTION, SOLUTION SUBCUTANEOUS
Qty: 4 ML | Refills: 2 | Status: SHIPPED | OUTPATIENT
Start: 2024-12-02 | End: 2025-05-31

## 2025-01-24 ENCOUNTER — TELEPHONE (OUTPATIENT)
Dept: DERMATOLOGY | Facility: CLINIC | Age: 60
End: 2025-01-24
Payer: MEDICAID

## 2025-01-24 DIAGNOSIS — L20.89 OTHER ATOPIC DERMATITIS: Primary | ICD-10-CM

## 2025-01-24 RX ORDER — DUPILUMAB 300 MG/2ML
INJECTION, SOLUTION SUBCUTANEOUS
Qty: 4 ML | Refills: 0 | Status: SHIPPED | OUTPATIENT
Start: 2025-01-24

## 2025-01-24 RX ORDER — DUPILUMAB 300 MG/2ML
INJECTION, SOLUTION SUBCUTANEOUS
Qty: 2 PEN | Refills: 0 | Status: SHIPPED | OUTPATIENT
Start: 2025-01-24 | End: 2025-01-24 | Stop reason: SDUPTHER

## 2025-01-24 RX ORDER — DUPILUMAB 300 MG/2ML
300 INJECTION, SOLUTION SUBCUTANEOUS
Qty: 4 ML | Refills: 2 | Status: SHIPPED | OUTPATIENT
Start: 2025-01-24

## 2025-01-24 NOTE — TELEPHONE ENCOUNTER
Aminah caretaker/nurse from group home LM 1/23/24 stating at beginning og Nov at was placed on Hospice and Dupixent was discontinued. Pt has since improved and doctor wants all previous orders reinstated. She was wondering if pt needs to have loading dose or can he just continue with maintenance dose?   Message sent to pharmacist at  specialty and per Juan pt will need to restart with loading dose. He will need new prescription for loading dose sent to  Specialty.   Returned call to Aminah and informed her of this. She also needs written orders for Loading and Maintenance dose for their record. Can be faxed to [phone].

## 2025-01-28 PROCEDURE — RXMED WILLOW AMBULATORY MEDICATION CHARGE

## 2025-01-29 ENCOUNTER — SPECIALTY PHARMACY (OUTPATIENT)
Dept: PHARMACY | Facility: CLINIC | Age: 60
End: 2025-01-29

## 2025-01-30 ENCOUNTER — PHARMACY VISIT (OUTPATIENT)
Dept: PHARMACY | Facility: CLINIC | Age: 60
End: 2025-01-30
Payer: COMMERCIAL

## 2025-01-31 ENCOUNTER — SPECIALTY PHARMACY (OUTPATIENT)
Dept: PHARMACY | Facility: CLINIC | Age: 60
End: 2025-01-31

## 2025-02-18 ENCOUNTER — SPECIALTY PHARMACY (OUTPATIENT)
Dept: PHARMACY | Facility: CLINIC | Age: 60
End: 2025-02-18

## 2025-02-19 ENCOUNTER — PHARMACY VISIT (OUTPATIENT)
Dept: PHARMACY | Facility: CLINIC | Age: 60
End: 2025-02-19
Payer: COMMERCIAL

## 2025-02-19 ENCOUNTER — SPECIALTY PHARMACY (OUTPATIENT)
Dept: PHARMACY | Facility: CLINIC | Age: 60
End: 2025-02-19

## 2025-02-19 PROCEDURE — RXMED WILLOW AMBULATORY MEDICATION CHARGE

## 2025-02-20 ENCOUNTER — SPECIALTY PHARMACY (OUTPATIENT)
Dept: PHARMACY | Facility: CLINIC | Age: 60
End: 2025-02-20

## 2025-02-28 ENCOUNTER — APPOINTMENT (OUTPATIENT)
Dept: DERMATOLOGY | Facility: CLINIC | Age: 60
End: 2025-02-28
Payer: MEDICAID

## 2025-02-28 DIAGNOSIS — Z79.899 ENCOUNTER FOR LONG-TERM (CURRENT) USE OF HIGH-RISK MEDICATION: ICD-10-CM

## 2025-02-28 DIAGNOSIS — L02.414 CUTANEOUS ABSCESS OF LEFT UPPER EXTREMITY: ICD-10-CM

## 2025-02-28 DIAGNOSIS — L20.89 OTHER ATOPIC DERMATITIS: Primary | ICD-10-CM

## 2025-02-28 PROCEDURE — 99213 OFFICE O/P EST LOW 20 MIN: CPT | Performed by: DERMATOLOGY

## 2025-02-28 RX ORDER — SENNOSIDES 8.6 MG/1
1 TABLET ORAL 2 TIMES DAILY
COMMUNITY

## 2025-02-28 RX ORDER — DUPILUMAB 300 MG/2ML
300 INJECTION, SOLUTION SUBCUTANEOUS
Qty: 4 ML | Refills: 5 | Status: SHIPPED | OUTPATIENT
Start: 2025-02-28

## 2025-02-28 RX ORDER — POLYETHYLENE GLYCOL 3350 17 G/17G
17 POWDER, FOR SOLUTION ORAL DAILY
COMMUNITY

## 2025-02-28 NOTE — PROGRESS NOTES
Subjective     Maximo Pablo is a 59 y.o. male who presents for the following: Dermatitis (Follow up - LV 08/23/24 - Caregiver states he has improved while using Dupixent.  Area(s) of concern: scalp) and Suspicious Skin Lesion (LV 08/23/24 - Patient has a history of: Atopic dermatitis (Dupixent).  Area(s) of concern: left upper arm is described as a abscessed, was lanced and an calcium alginate with foam and bandaging placed.). Patient had finished course of antibiotics.   Labs - 11/26/24 - Tspot - negative, CBC 11/11/24 - noted for anemia, 11/10/24 - CMP - slightly elevated alkaline phos and low albumin     Review of Systems:  No other skin or systemic complaints other than what is documented elsewhere in the note.    The following portions of the chart were reviewed this encounter and updated as appropriate:          Skin Cancer History  No skin cancer on file.      Specialty Problems          Dermatology Problems    Neoplasm of uncertain behavior of skin    Rash and other nonspecific skin eruption    Atopic dermatitis, unspecified        Objective   Well appearing patient in no apparent distress; mood and affect are within normal limits.    A focused skin examination was performed of the face, scalp, neck, upper extremities, legs. All findings within normal limits unless otherwise noted below.    Assessment/Plan   1. Other atopic dermatitis  Scalp, ears with scaling.  Bilateral shoulders and anterior shoulders with hyperpigmented scaly, slightly lichenified patches and plaques  BSA approximately 5%  SCORAD approximately 9.35    The chronic and intermittently flaring nature of this skin condition was discussed with the patient/cargiver today.     Patient previously on topical triamcinolone ointment with improvement, however continued to flare a few months later. He has been on Dupixent for over 1 year and discontinued due to hospice, however restarted as coming off hospice and palliative care and is doing  extremely well on treatment.    Caregiver does not report side effects on treatment.   Last labs 11/2024: Tspot negative; CBC, CMP stable     Continue Dupixent 300mg every other week subcutaneously    Continue triamcinolone 0.1% topical ointment as needed for flaring. apply 2x daily x 2 wks then decrease to 2x/day 2 days per week. Can repeat full 2 week course as often as every 6-8 weeks as needed for flaring.     Dupixent is a medication indicated for moderate to severe atopic dermatitis that does not respond to routine topical therapy. Side effects of medication including allergic reactions (hives, swelling, itching, rash), injection site reaction (swelling at site where medication injected with pen or syringe), increased risk of infection (specifically parasitic), cold sores, joint pain, sore throat, dry, red eye (pink eye like symptoms).    Side effects of topical steroids includes, but is not limited to skin atrophy and dyspigmentation.    Related Medications  dupilumab (Dupixent Pen) 300 mg/2 mL pen injector  Inject 600mg (2 pens) under the skin on day 1    dupilumab (Dupixent Pen) 300 mg/2 mL pen injector  Inject 1 pen (300 mg) under the skin every 14 (fourteen) days.    2. Cutaneous abscess of left upper extremity  Left Upper Arm - Anterior  Open draining non erythematous, tender wound    Abscess s/p I&D and cephalexin    Healing appropriately and no concerns for residual infection at this time.    Continue dressing changes once daily with gentle soap and water until wound closes.    If recurs/increase redness, pain, fever - should contact office for further recommendations.    Follow up as needed.      Follow up for atopic dermatitis in 6 months for continued management of this complex condition on biologic therapy

## 2025-03-18 PROCEDURE — RXMED WILLOW AMBULATORY MEDICATION CHARGE

## 2025-03-19 ENCOUNTER — PHARMACY VISIT (OUTPATIENT)
Dept: PHARMACY | Facility: CLINIC | Age: 60
End: 2025-03-19
Payer: COMMERCIAL

## 2025-04-18 ENCOUNTER — SPECIALTY PHARMACY (OUTPATIENT)
Dept: PHARMACY | Facility: CLINIC | Age: 60
End: 2025-04-18

## 2025-04-18 PROCEDURE — RXMED WILLOW AMBULATORY MEDICATION CHARGE

## 2025-04-19 ENCOUNTER — PHARMACY VISIT (OUTPATIENT)
Dept: PHARMACY | Facility: CLINIC | Age: 60
End: 2025-04-19
Payer: COMMERCIAL

## 2025-05-14 ENCOUNTER — SPECIALTY PHARMACY (OUTPATIENT)
Dept: PHARMACY | Facility: CLINIC | Age: 60
End: 2025-05-14

## 2025-05-14 PROCEDURE — RXMED WILLOW AMBULATORY MEDICATION CHARGE

## 2025-05-15 ENCOUNTER — PHARMACY VISIT (OUTPATIENT)
Dept: PHARMACY | Facility: CLINIC | Age: 60
End: 2025-05-15
Payer: COMMERCIAL

## 2025-05-27 ENCOUNTER — SPECIALTY PHARMACY (OUTPATIENT)
Dept: PHARMACY | Facility: CLINIC | Age: 60
End: 2025-05-27

## 2025-05-27 NOTE — PROGRESS NOTES
[facility] Clinical Note  Patient Reassessment     Introduction  Maximo Pablo is a 59 y.o. male who is on the specialty pharmacy service for management of: Dermatology Core.      [facility] supplied medication: dupilumab (Dupixent Pen) 300 mg/2 mL pen injector  Inject 1 pen (300 mg) under the skin every 14 (fourteen) days.        Duration of therapy: Maintenance    The most recent encounter visit with the referring prescriber Keturah Nathan DO on 02/28/25 was reviewed.  Pharmacy will continue to collaborate in the care of this patient with the referring prescriber.    Discussion  Maximo was contacted on 5/27/2025 at 1:33 PM for a pharmacy visit with encounter number [id number] from:   [facility]  [address] [place] [id number]  Dept: [phone]  Dept Fax: [phone]  Caregiver consented to a/an Telephone visit, which was performed.    Efficacy  Patient has developed new symptoms of condition: No  Patient/caregiver feels medication is affecting the disease state: Nurse Aminah has reported great success with Dupixent. She noted the patient's skin is 100% clear currently and she denied any recent flares. She noted the patient being off of Dupixent a few months back due to being hospice. However, has been back on Dupixent for a few months now. She notes applying topical agents as prescribed.     Goals  Provided education on goals and possible outcomes of therapy:  Adherence with therapy  Timely completion of appropriate labs  Timely and appropriate follow up with provider  Identify and address medication interactions with presciption medications, OTC medications and supplements  Optimize or maintain quality of life  Dermatology: Prevent or reduce disease flares  Reduce pain, itchiness, inflammation and body surface area affected by atopic dermatitis  Patient has documented target(s) for goals of therapy: Yes    Targets       Target Due Completed  Completed By Outcome Source     Goal:  Prevent and reduce disease flares 11/27/2025 -- -- -- --         Goal:  Prevent and reduce disease flares 11/27/2025 5/27/2025 Sonia KeeneD On Track --    Skin 100% clear. No flares.              Tolerance  Patient has experienced side effects from this medication: No  Changes to current therapy regimen: No    The follow-up timeline was discussed. Every person responds to and reacts to therapy differently. Patient should be assessed for efficacy and tolerability in approximately: 6 months            Adherence  Patient Information  Informant: Caregiver  Demonstrates Understanding of Importance of Adherence: Yes  Does the patient have any barriers to self-administration (including physical and mental?): Yes  Action Taken to Mitigate Barriers for Self-Administration: Nurse Burr injectsion  Support Network for Adherence: Home Health Agency  Medication Information  Medication: dupilumab (Dupixent Pen)  Patient Reported Missed Doses in the Last 4 Weeks: 0  Estimated Medication Adherence Level: Good  Adherence Estimation Source: Claims history  Barriers to Adherence: No Problems identified   The importance of adherence was discussed and patient/caregiver was advised to take the medication as prescribed by their provider. Encouraged patient/caregiver to call physician's office or specialty pharmacy if they have a question regarding a missed dose.    General Assessment  Changes to home medications, OTCs or supplements: No  Current Medications[1]  Reported new allergies: No  Reported new medical conditions: No  Additional monitoring reviewed: Dermatology- No lab monitoring needed- There are no routine laboratory monitoring parameters for this medication  Is laboratory follow up needed? No    Advised to contact the pharmacy if there are any changes to the patient's medication list, including prescriptions, OTC medications, herbal products, or  supplements.    Impression/Plan  This patient has been identified as high risk due to Mentally impaired.  The following action was taken:Engaged patient support system          QOL/Patient Satisfaction  Rate your quality of life on scale of 1-10: 7  Rate your satisfaction with  Specialty Pharmacy on scale of 1-10: 10 - Completely satisfied    Provided contact information ([phone]) for [facility] and reviewed dispensing process, refill timeline and patient management follow up. Confirmed understanding of education conducted during assessment. All questions and concerns were addressed and patient/caregiver was encouraged to reach out for additional questions or concerns.    Based on the patient's diagnosis, medication list, progress towards goals, adherence, tolerance, and medication list, medication remains appropriate: Therapy remains appropriate (I attest)    Laura Broussard, PharmD       [1]   Current Outpatient Medications   Medication Sig Dispense Refill    aspirin 81 mg chewable tablet Chew 1 tablet (81 mg) once daily.      atropine 1 % ophthalmic solution 1 drop. Give sublingually q4-6prn for drooling- oral use only      dupilumab (Dupixent Pen) 300 mg/2 mL pen injector Inject 600mg (2 pens) under the skin on day 1 4 mL 0    dupilumab (Dupixent Pen) 300 mg/2 mL pen injector Inject 1 pen (300 mg) under the skin every 14 (fourteen) days. 4 mL 5    levETIRAcetam (Keppra) 500 mg tablet Take 1 tablet (500 mg) by mouth twice a day.      OXcarbazepine (Trileptal) 150 mg tablet Take 1 tablet (150 mg) by mouth 2 times a day. Total of 450mg      OXcarbazepine (Trileptal) 300 mg tablet Take 1 tablet (300 mg) by mouth 2 times a day. Total of 450mg      polyethylene glycol (Glycolax, Miralax) 17 gram packet Take 17 g by mouth once daily.      sennosides (Senokot) 8.6 mg tablet Take 1 tablet (8.6 mg) by mouth 2 times a day.       No current facility-administered medications for this visit.

## 2025-05-31 ENCOUNTER — HOSPITAL ENCOUNTER (EMERGENCY)
Facility: HOSPITAL | Age: 60
Discharge: HOME | End: 2025-06-01
Attending: STUDENT IN AN ORGANIZED HEALTH CARE EDUCATION/TRAINING PROGRAM
Payer: MEDICAID

## 2025-05-31 ENCOUNTER — APPOINTMENT (OUTPATIENT)
Dept: RADIOLOGY | Facility: HOSPITAL | Age: 60
End: 2025-05-31
Payer: MEDICAID

## 2025-05-31 DIAGNOSIS — R74.8 ELEVATED LIVER ENZYMES: ICD-10-CM

## 2025-05-31 DIAGNOSIS — K52.9 PROCTOCOLITIS: ICD-10-CM

## 2025-05-31 DIAGNOSIS — K59.00 CONSTIPATION, UNSPECIFIED CONSTIPATION TYPE: Primary | ICD-10-CM

## 2025-05-31 LAB
ALBUMIN SERPL BCP-MCNC: 4.3 G/DL (ref 3.4–5)
ALP SERPL-CCNC: 254 U/L (ref 33–120)
ALT SERPL W P-5'-P-CCNC: 132 U/L (ref 10–52)
ANION GAP SERPL CALC-SCNC: 12 MMOL/L (ref 10–20)
APAP SERPL-MCNC: <10 UG/ML (ref ?–30)
AST SERPL W P-5'-P-CCNC: 102 U/L (ref 9–39)
BASOPHILS # BLD AUTO: 0.01 X10*3/UL (ref 0–0.1)
BASOPHILS NFR BLD AUTO: 0.2 %
BILIRUB SERPL-MCNC: 0.2 MG/DL (ref 0–1.2)
BUN SERPL-MCNC: 7 MG/DL (ref 6–23)
CALCIUM SERPL-MCNC: 10.7 MG/DL (ref 8.6–10.3)
CHLORIDE SERPL-SCNC: 94 MMOL/L (ref 98–107)
CO2 SERPL-SCNC: 34 MMOL/L (ref 21–32)
CREAT SERPL-MCNC: 0.71 MG/DL (ref 0.5–1.3)
EGFRCR SERPLBLD CKD-EPI 2021: >90 ML/MIN/1.73M*2
EOSINOPHIL # BLD AUTO: 0.3 X10*3/UL (ref 0–0.7)
EOSINOPHIL NFR BLD AUTO: 7 %
ERYTHROCYTE [DISTWIDTH] IN BLOOD BY AUTOMATED COUNT: 15.9 % (ref 11.5–14.5)
ETHANOL SERPL-MCNC: <10 MG/DL
FLUAV RNA RESP QL NAA+PROBE: NOT DETECTED
FLUBV RNA RESP QL NAA+PROBE: NOT DETECTED
GLUCOSE SERPL-MCNC: 109 MG/DL (ref 74–99)
HCT VFR BLD AUTO: 46.4 % (ref 41–52)
HGB BLD-MCNC: 14.9 G/DL (ref 13.5–17.5)
IMM GRANULOCYTES # BLD AUTO: 0.01 X10*3/UL (ref 0–0.7)
IMM GRANULOCYTES NFR BLD AUTO: 0.2 % (ref 0–0.9)
LYMPHOCYTES # BLD AUTO: 1.53 X10*3/UL (ref 1.2–4.8)
LYMPHOCYTES NFR BLD AUTO: 35.6 %
MAGNESIUM SERPL-MCNC: 2.05 MG/DL (ref 1.6–2.4)
MCH RBC QN AUTO: 26.9 PG (ref 26–34)
MCHC RBC AUTO-ENTMCNC: 32.1 G/DL (ref 32–36)
MCV RBC AUTO: 84 FL (ref 80–100)
MONOCYTES # BLD AUTO: 0.21 X10*3/UL (ref 0.1–1)
MONOCYTES NFR BLD AUTO: 4.9 %
NEUTROPHILS # BLD AUTO: 2.24 X10*3/UL (ref 1.2–7.7)
NEUTROPHILS NFR BLD AUTO: 52.1 %
NRBC BLD-RTO: 0 /100 WBCS (ref 0–0)
PLATELET # BLD AUTO: 202 X10*3/UL (ref 150–450)
POTASSIUM SERPL-SCNC: 4.2 MMOL/L (ref 3.5–5.3)
PROT SERPL-MCNC: 8.5 G/DL (ref 6.4–8.2)
RBC # BLD AUTO: 5.53 X10*6/UL (ref 4.5–5.9)
RSV RNA RESP QL NAA+PROBE: NOT DETECTED
SALICYLATES SERPL-MCNC: <3 MG/DL (ref ?–20)
SARS-COV-2 RNA RESP QL NAA+PROBE: NOT DETECTED
SODIUM SERPL-SCNC: 136 MMOL/L (ref 136–145)
WBC # BLD AUTO: 4.3 X10*3/UL (ref 4.4–11.3)

## 2025-05-31 PROCEDURE — 2500000004 HC RX 250 GENERAL PHARMACY W/ HCPCS (ALT 636 FOR OP/ED): Performed by: STUDENT IN AN ORGANIZED HEALTH CARE EDUCATION/TRAINING PROGRAM

## 2025-05-31 PROCEDURE — 80053 COMPREHEN METABOLIC PANEL: CPT

## 2025-05-31 PROCEDURE — 96361 HYDRATE IV INFUSION ADD-ON: CPT

## 2025-05-31 PROCEDURE — 2500000001 HC RX 250 WO HCPCS SELF ADMINISTERED DRUGS (ALT 637 FOR MEDICARE OP)

## 2025-05-31 PROCEDURE — 96360 HYDRATION IV INFUSION INIT: CPT

## 2025-05-31 PROCEDURE — 80320 DRUG SCREEN QUANTALCOHOLS: CPT | Performed by: STUDENT IN AN ORGANIZED HEALTH CARE EDUCATION/TRAINING PROGRAM

## 2025-05-31 PROCEDURE — 36415 COLL VENOUS BLD VENIPUNCTURE: CPT

## 2025-05-31 PROCEDURE — 74176 CT ABD & PELVIS W/O CONTRAST: CPT | Performed by: RADIOLOGY

## 2025-05-31 PROCEDURE — 87637 SARSCOV2&INF A&B&RSV AMP PRB: CPT

## 2025-05-31 PROCEDURE — 85025 COMPLETE CBC W/AUTO DIFF WBC: CPT

## 2025-05-31 PROCEDURE — 74176 CT ABD & PELVIS W/O CONTRAST: CPT

## 2025-05-31 PROCEDURE — 99284 EMERGENCY DEPT VISIT MOD MDM: CPT | Performed by: STUDENT IN AN ORGANIZED HEALTH CARE EDUCATION/TRAINING PROGRAM

## 2025-05-31 PROCEDURE — 99284 EMERGENCY DEPT VISIT MOD MDM: CPT | Mod: 25 | Performed by: STUDENT IN AN ORGANIZED HEALTH CARE EDUCATION/TRAINING PROGRAM

## 2025-05-31 PROCEDURE — 83735 ASSAY OF MAGNESIUM: CPT

## 2025-05-31 RX ORDER — DOCUSATE SODIUM 100 MG/1
100 CAPSULE, LIQUID FILLED ORAL EVERY 12 HOURS
Qty: 60 CAPSULE | Refills: 0 | Status: SHIPPED | OUTPATIENT
Start: 2025-05-31 | End: 2025-06-30

## 2025-05-31 RX ORDER — WATER
500 LIQUID (ML) MISCELLANEOUS ONCE
Status: DISCONTINUED | OUTPATIENT
Start: 2025-05-31 | End: 2025-06-01 | Stop reason: HOSPADM

## 2025-05-31 RX ORDER — ENEMA 19; 7 G/133ML; G/133ML
1 ENEMA RECTAL DAILY
Qty: 7 EACH | Refills: 0 | Status: SHIPPED | OUTPATIENT
Start: 2025-05-31

## 2025-05-31 RX ORDER — LABETALOL 100 MG/1
100 TABLET, FILM COATED ORAL ONCE
Status: DISCONTINUED | OUTPATIENT
Start: 2025-05-31 | End: 2025-06-01 | Stop reason: HOSPADM

## 2025-05-31 RX ORDER — POLYETHYLENE GLYCOL 3350 17 G/17G
17 POWDER, FOR SOLUTION ORAL 2 TIMES DAILY
Qty: 14 PACKET | Refills: 0 | Status: SHIPPED | OUTPATIENT
Start: 2025-05-31 | End: 2025-06-07

## 2025-05-31 RX ORDER — POLYETHYLENE GLYCOL 3350 17 G/17G
17 POWDER, FOR SOLUTION ORAL ONCE
Status: DISCONTINUED | OUTPATIENT
Start: 2025-05-31 | End: 2025-06-01 | Stop reason: HOSPADM

## 2025-05-31 RX ADMIN — SODIUM CHLORIDE, SODIUM LACTATE, POTASSIUM CHLORIDE, AND CALCIUM CHLORIDE 1000 ML: .6; .31; .03; .02 INJECTION, SOLUTION INTRAVENOUS at 22:03

## 2025-05-31 ASSESSMENT — PAIN - FUNCTIONAL ASSESSMENT: PAIN_FUNCTIONAL_ASSESSMENT: WONG-BAKER FACES

## 2025-05-31 ASSESSMENT — PAIN SCALES - WONG BAKER: WONGBAKER_NUMERICALRESPONSE: NO HURT

## 2025-06-01 VITALS
TEMPERATURE: 95 F | OXYGEN SATURATION: 98 % | DIASTOLIC BLOOD PRESSURE: 111 MMHG | RESPIRATION RATE: 16 BRPM | SYSTOLIC BLOOD PRESSURE: 175 MMHG | HEART RATE: 54 BPM

## 2025-06-01 NOTE — ED PROVIDER NOTES
EMERGENCY DEPARTMENT ENCOUNTER      Pt Name: Maximo Pablo  MRN: [id number]  Birthdate 1965  Date of evaluation: 5/31/2025  Provider: Kristian Pierce MD    CHIEF COMPLAINT       Chief Complaint   Patient presents with    lethargic     Pt not eating or taking meds     HISTORY OF PRESENT ILLNESS    HPI  59-year-old male with past medical history significant for cerebral palsy and MRDD nonverbal at baseline presents emergency department from his facility for not eating and concerns for possible small bowel obstruction.  He was not eating as he normally does and would not take his afternoon medications or eat properly. He does have a history of spitting out food.  They deny any nausea vomiting or diarrhea.  They indicate that he had a large bowel movement after an enema was placed  today with no melena or hematochezia.  He is currently at his neurologic baseline.  He has not had any fevers.  Caregiver at bedside states that they currently give as needed laxatives.  Vitals have been stable at group home without any significant abnormalities.  Nursing Notes were reviewed.    PAST MEDICAL HISTORY   Medical History[1]      SURGICAL HISTORY     Surgical History[2]      CURRENT MEDICATIONS       Previous Medications    ASPIRIN 81 MG CHEWABLE TABLET    Chew 1 tablet (81 mg) once daily.    ATROPINE 1 % OPHTHALMIC SOLUTION    1 drop. Give sublingually q4-6prn for drooling- oral use only    DUPILUMAB (DUPIXENT PEN) 300 MG/2 ML PEN INJECTOR    Inject 600mg (2 pens) under the skin on day 1    DUPILUMAB (DUPIXENT PEN) 300 MG/2 ML PEN INJECTOR    Inject 1 pen (300 mg) under the skin every 14 (fourteen) days.    LEVETIRACETAM (KEPPRA) 500 MG TABLET    Take 1 tablet (500 mg) by mouth twice a day.    OXCARBAZEPINE (TRILEPTAL) 150 MG TABLET    Take 1 tablet (150 mg) by mouth 2 times a day. Total of 450mg    OXCARBAZEPINE (TRILEPTAL) 300 MG TABLET    Take 1 tablet (300 mg) by mouth 2 times a day. Total of 450mg    POLYETHYLENE  GLYCOL (GLYCOLAX, MIRALAX) 17 GRAM PACKET    Take 17 g by mouth once daily.    SENNOSIDES (SENOKOT) 8.6 MG TABLET    Take 1 tablet (8.6 mg) by mouth 2 times a day.       ALLERGIES     Patient has no known allergies.    FAMILY HISTORY     Family History[3]       SOCIAL HISTORY     Social History[4]    SCREENINGS                        PHYSICAL EXAM    (up to 7 for level 4, 8 or more for level 5)     ED Triage Vitals   Temperature Heart Rate Respirations BP   05/31/25 2003 05/31/25 2003 05/31/25 2003 05/31/25 2000   35 °C (95 °F) 61 18 160/87      Pulse Ox Temp Source Heart Rate Source Patient Position   05/31/25 2000 05/31/25 2003 -- --   96 % Temporal        BP Location FiO2 (%)     -- --             Physical Exam  HENT:      Head: Normocephalic and atraumatic.      Nose: Nose normal.      Mouth/Throat:      Mouth: Mucous membranes are moist.      Pharynx: Oropharynx is clear.   Eyes:      Extraocular Movements: Extraocular movements intact.      Pupils: Pupils are equal, round, and reactive to light.   Cardiovascular:      Rate and Rhythm: Normal rate and regular rhythm.      Pulses: Normal pulses.      Heart sounds: Normal heart sounds.   Pulmonary:      Effort: Pulmonary effort is normal.      Breath sounds: Normal breath sounds.   Abdominal:      General: Abdomen is flat and scaphoid. Bowel sounds are normal. There is no distension or abdominal bruit. There are no signs of injury.      Palpations: Abdomen is soft.      Tenderness: There is no abdominal tenderness.      Comments: Patient nonverbal at baseline however on examination palpitation of the abdomen did not elicit pain.   Skin:     General: Skin is warm.      Capillary Refill: Capillary refill takes less than 2 seconds.   Neurological:      Mental Status: He is alert. Mental status is at baseline.      Comments: Bilateral upper extremity contractures consistent with CP   Psychiatric:         Mood and Affect: Mood normal.          DIAGNOSTIC RESULTS      LABS:  Labs Reviewed   SARS-COV-2, INFLUENZA A/B AND RSV PCR   URINALYSIS WITH REFLEX CULTURE AND MICROSCOPIC    Narrative:     The following orders were created for panel order Urinalysis with Reflex Culture and Microscopic.  Procedure                               Abnormality         Status                     ---------                               -----------         ------                     Urinalysis with Reflex C...[[id number]]                                                 Extra Urine Gray Tube[[id number]]                                                         Please view results for these tests on the individual orders.   CBC WITH AUTO DIFFERENTIAL   MAGNESIUM   COMPREHENSIVE METABOLIC PANEL   URINALYSIS WITH REFLEX CULTURE AND MICROSCOPIC   EXTRA URINE GRAY TUBE       All other labs were within normal range or not returned as of this dictation.    Imaging  CT abdomen pelvis wo IV contrast    (Results Pending)        Procedures  Procedures     EMERGENCY DEPARTMENT COURSE/MDM:   Medical Decision Making  59-year-old male presents emergency department chief complaint of not eating properly and not taking his medications.  Medical management treatment Emergency Department will be to rule out any possible small bowel obstruction due to poor appetite today.  This was the concern of the nursing staff.  Will also evaluate basic labs rule out any possible infectious source.    Patient's labs remarkable for a mildly elevated transaminitis.  Unable to obtain urine as patient had already urinated and is brief on arrival and nursing had a negative straight cath however he has not had any urinary symptoms that staff is aware of, blood in the urine or fever.  Group home staff are agreeable to monitor for any signs of UTI.  Patient was given 1 L of lactated Ringer's.  Attempt was given to give the patient his antihypertensive medication.  However he spit it out which is not abnormal for him per caregiver.  Caretaker  indicates that as long as the patient does not have a small bowel obstruction they feel comfortable bringing him home that he does spit up from time to time they will provide him with his medication and his laxatives.  We will attempt to increase his laxatives as the patient is constipated.  Recommending twice daily MiraLAX, Colace, continue with senna and daily fleets enema.  CT of the abdomen pelvis does not show small bowel obstruction however does show stool load.  The patient did have a bowel movement earlier today.  He has positive bowel sounds at this time.    The patient will be discharged home with strict return precautions.    Patient and or family in agreement and understanding of treatment plan.  All questions answered.      I reviewed the case with the attending ED physician. The attending ED physician agrees with the plan. Patient and/or patient´s representative was counseled regarding labs, imaging, likely diagnosis, and plan. All questions were answered.    ED Medications administered this visit:  Medications - No data to display    New Prescriptions from this visit:    New Prescriptions    No medications on file       Follow-up:  No follow-up provider specified.      Final Impression: No diagnosis found.      (Please note that portions of this note were completed with a voice recognition program.  Efforts were made to edit the dictations but occasionally words are mis-transcribed.)       Kristian Pierce MD  Resident  05/31/25 6810    I performed a history and physical examination of the patient and discussed his management with the resident physician.  I agree with the history, physical, assessment, and plan of care, with the following exceptions:   None    I was present for key and critical portions of the following procedures: None  Time Spent in Critical Care of the patient: None  Time spent in discussions with the patient and family: 30    Tim Montalvo DO         [1]   Past Medical  History:  Diagnosis Date    Epilepsy, unspecified, not intractable, without status epilepticus     Epilepsy    Hyperlipidemia, unspecified     Dyslipidemia    Personal history of other diseases of the circulatory system     History of hypertension    Personal history of other diseases of the nervous system and sense organs     History of cerebral palsy    Personal history of other endocrine, nutritional and metabolic disease     History of vitamin D deficiency    Personal history of other specified conditions     History of dysphagia   [2]   Past Surgical History:  Procedure Laterality Date    OTHER SURGICAL HISTORY  11/26/2019    No history of surgery   [3] No family history on file.  [4]   Social History  Socioeconomic History    Marital status: Single   Tobacco Use    Smoking status: Never    Smokeless tobacco: Never   Substance and Sexual Activity    Alcohol use: Defer    Drug use: Defer     Social Drivers of Health     Financial Resource Strain: Patient Declined (11/9/2024)    Overall Financial Resource Strain (CARDIA)     Difficulty of Paying Living Expenses: Patient declined   Food Insecurity: Patient Declined (11/9/2024)    Hunger Vital Sign     Worried About Running Out of Food in the Last Year: Patient declined     Ran Out of Food in the Last Year: Patient declined   Transportation Needs: Patient Declined (11/9/2024)    PRAPARE - Transportation     Lack of Transportation (Medical): Patient declined     Lack of Transportation (Non-Medical): Patient declined   Physical Activity: Patient Declined (11/9/2024)    Exercise Vital Sign     Days of Exercise per Week: Patient declined     Minutes of Exercise per Session: Patient declined   Intimate Partner Violence: Patient Declined (11/9/2024)    Humiliation, Afraid, Rape, and Kick questionnaire     Fear of Current or Ex-Partner: Patient declined     Emotionally Abused: Patient declined     Physically Abused: Patient declined     Sexually Abused: Patient declined    Housing Stability: Patient Declined (11/9/2024)    Housing Stability Vital Sign     Unable to Pay for Housing in the Last Year: Patient declined     Number of Times Moved in the Last Year: 1     Homeless in the Last Year: Patient declined        Tim Montalvo DO  06/01/25 [id number]

## 2025-06-01 NOTE — DISCHARGE INSTRUCTIONS
Patient was seen in the emergency department today for evaluation of not eating.  CT scan did show constipation please increase the patient's MiraLAX dosage.  Did not show small bowel obstruction but he does have substantial constipation.  I recommend continuing with daily enemas for the next week until he is having regular bowel movements on his own.  Please start him on Colace along with his senna.  Please follow with gastroenterology which referral was placed for..  Please continue to attempt to give him his medications by mouth and you can encourage his intake with more palatable substances.  Should he be unable or unwilling to eat or drink for greater than 24 hours please bring him back to emergency department for further evaluation and consideration of alternative methods of nutrition.  If he develops any worsening pain nausea vomiting or fever please return back to the emergency department soon as possible.

## 2025-06-01 NOTE — ED TRIAGE NOTES
Pt into ED from group home, pt not eating or taking meds. Lethargy reported. Pt non verbal, hx of mrdd and cerebral palsy.

## 2025-06-02 ENCOUNTER — HOSPITAL ENCOUNTER (EMERGENCY)
Facility: HOSPITAL | Age: 60
Discharge: HOME | End: 2025-06-03
Attending: EMERGENCY MEDICINE
Payer: MEDICAID

## 2025-06-02 DIAGNOSIS — K62.5 RECTAL BLEEDING: Primary | ICD-10-CM

## 2025-06-02 LAB
ALBUMIN SERPL BCP-MCNC: 4.1 G/DL (ref 3.4–5)
ALP SERPL-CCNC: 267 U/L (ref 33–120)
ALT SERPL W P-5'-P-CCNC: 100 U/L (ref 10–52)
ANION GAP SERPL CALC-SCNC: 12 MMOL/L (ref 10–20)
AST SERPL W P-5'-P-CCNC: 67 U/L (ref 9–39)
BASOPHILS # BLD AUTO: 0 X10*3/UL (ref 0–0.1)
BASOPHILS NFR BLD AUTO: 0 %
BILIRUB SERPL-MCNC: 0.2 MG/DL (ref 0–1.2)
BUN SERPL-MCNC: 13 MG/DL (ref 6–23)
CALCIUM SERPL-MCNC: 10.1 MG/DL (ref 8.6–10.3)
CHLORIDE SERPL-SCNC: 100 MMOL/L (ref 98–107)
CO2 SERPL-SCNC: 30 MMOL/L (ref 21–32)
CREAT SERPL-MCNC: 0.94 MG/DL (ref 0.5–1.3)
EGFRCR SERPLBLD CKD-EPI 2021: >90 ML/MIN/1.73M*2
EOSINOPHIL # BLD AUTO: 0.28 X10*3/UL (ref 0–0.7)
EOSINOPHIL NFR BLD AUTO: 7 %
ERYTHROCYTE [DISTWIDTH] IN BLOOD BY AUTOMATED COUNT: 16.4 % (ref 11.5–14.5)
GLUCOSE SERPL-MCNC: 99 MG/DL (ref 74–99)
HCT VFR BLD AUTO: 46.5 % (ref 41–52)
HGB BLD-MCNC: 14.7 G/DL (ref 13.5–17.5)
IMM GRANULOCYTES # BLD AUTO: 0.01 X10*3/UL (ref 0–0.7)
IMM GRANULOCYTES NFR BLD AUTO: 0.3 % (ref 0–0.9)
LYMPHOCYTES # BLD AUTO: 1.81 X10*3/UL (ref 1.2–4.8)
LYMPHOCYTES NFR BLD AUTO: 45.4 %
MCH RBC QN AUTO: 26.7 PG (ref 26–34)
MCHC RBC AUTO-ENTMCNC: 31.6 G/DL (ref 32–36)
MCV RBC AUTO: 85 FL (ref 80–100)
MONOCYTES # BLD AUTO: 0.21 X10*3/UL (ref 0.1–1)
MONOCYTES NFR BLD AUTO: 5.3 %
NEUTROPHILS # BLD AUTO: 1.68 X10*3/UL (ref 1.2–7.7)
NEUTROPHILS NFR BLD AUTO: 42 %
NRBC BLD-RTO: 0 /100 WBCS (ref 0–0)
PLATELET # BLD AUTO: 171 X10*3/UL (ref 150–450)
POTASSIUM SERPL-SCNC: 4.4 MMOL/L (ref 3.5–5.3)
PROT SERPL-MCNC: 8.2 G/DL (ref 6.4–8.2)
RBC # BLD AUTO: 5.5 X10*6/UL (ref 4.5–5.9)
SODIUM SERPL-SCNC: 138 MMOL/L (ref 136–145)
WBC # BLD AUTO: 4 X10*3/UL (ref 4.4–11.3)

## 2025-06-02 PROCEDURE — 80053 COMPREHEN METABOLIC PANEL: CPT

## 2025-06-02 PROCEDURE — 85025 COMPLETE CBC W/AUTO DIFF WBC: CPT

## 2025-06-02 PROCEDURE — 99284 EMERGENCY DEPT VISIT MOD MDM: CPT | Performed by: EMERGENCY MEDICINE

## 2025-06-02 PROCEDURE — 99283 EMERGENCY DEPT VISIT LOW MDM: CPT | Performed by: EMERGENCY MEDICINE

## 2025-06-02 PROCEDURE — 36415 COLL VENOUS BLD VENIPUNCTURE: CPT

## 2025-06-02 ASSESSMENT — PAIN SCALES - WONG BAKER: WONGBAKER_NUMERICALRESPONSE: NO HURT

## 2025-06-03 VITALS
HEART RATE: 78 BPM | SYSTOLIC BLOOD PRESSURE: 176 MMHG | RESPIRATION RATE: 16 BRPM | WEIGHT: 164 LBS | TEMPERATURE: 97.2 F | BODY MASS INDEX: 28.15 KG/M2 | OXYGEN SATURATION: 95 % | DIASTOLIC BLOOD PRESSURE: 103 MMHG

## 2025-06-03 LAB
HCT VFR BLD AUTO: 48.2 % (ref 41–52)
HGB BLD-MCNC: 15.3 G/DL (ref 13.5–17.5)
HOLD SPECIMEN: NORMAL

## 2025-06-03 PROCEDURE — 36415 COLL VENOUS BLD VENIPUNCTURE: CPT

## 2025-06-03 PROCEDURE — 85014 HEMATOCRIT: CPT

## 2025-06-03 NOTE — PROGRESS NOTES
Emergency Medicine Transition of Care Note.    I received Maximo Pablo in signout from Dr. Nguyễn.  Please see the previous ED provider note for all HPI, PE and MDM up to the time of signout at 2120. This is in addition to the primary record.    In brief Maximo Pablo is an 59 y.o. male presenting for   Chief Complaint   Patient presents with    Rectal Bleeding     Small amount of blood after enema today for constipation     At the time of signout we were awaiting: Repeat hemoglobin/hematocrit.  Anticipate discharge.    ED Course as of 06/03/25 0430 Tue Jun 03, 2025   0159 Repeat H&H shows stable hemoglobin and hematocrit.  Patient had no further bloody bowel movements.  Patient otherwise stable for discharge.  Anticipatory guidance and return precautions provided.  Patient to follow-up with outpatient PCP. [MI]      ED Course User Index  [MI] Lida Thomas MD         Diagnoses as of 06/03/25 0430   Rectal bleeding       Medical Decision Making      Final diagnoses:   [K62.5] Rectal bleeding           Procedure  Procedures    Lida Thomas MD

## 2025-06-03 NOTE — DISCHARGE INSTRUCTIONS
Please follow-up with your primary care provider.  Please return to close ED if develop any worsening profuse rectal bleeding, lightheaded/dizziness, chest pain, shortness of breath, increasing weakness, or loss of function.

## 2025-06-03 NOTE — ED PROVIDER NOTES
Emergency Department Provider Note        History of Present Illness     History provided by: Caregiver  Limitations to History: Developmental delay  External Records Reviewed with Brief Summary: None    HPI:  Maximo Pablo is a 59 y.o. male with cerebral palsy, MRDD, nonverbal presenting for bright red blood after enema today at his group home.  Patient does have chronic constipation and regularly receives enemas.  He had 1 today and afterwards had a few small smears of bright red blood mixed in with soft brown stools.  He was brought here for further evaluation.  Patient himself is unable provide a history but appears comfortable and awake sitting in the caregiver who is at bedside.    Physical Exam   Triage vitals:  T 36.2 °C (97.2 °F)  HR 58  /85  RR 17  O2 97 % None (Room air)    Physical Exam  Vitals and nursing note reviewed.   Constitutional:       General: He is not in acute distress.     Appearance: He is well-developed. He is not ill-appearing or toxic-appearing.   HENT:      Head: Normocephalic and atraumatic.      Right Ear: External ear normal.      Left Ear: External ear normal.      Nose: Nose normal.   Eyes:      General: No scleral icterus.     Conjunctiva/sclera: Conjunctivae normal.      Pupils: Pupils are equal, round, and reactive to light.   Cardiovascular:      Rate and Rhythm: Normal rate and regular rhythm.      Heart sounds: No murmur heard.  Pulmonary:      Effort: Pulmonary effort is normal. No respiratory distress.      Breath sounds: Normal breath sounds.   Abdominal:      Palpations: Abdomen is soft.      Tenderness: There is no abdominal tenderness.   Genitourinary:     Comments: Rectal exam was performed with JAVIER Osborne, at bedside as chaperone.  No external lesions or bleeding.  Soft brown stools on digital rectal exam without any melena or bright red blood.  Musculoskeletal:         General: No swelling.      Cervical back: Neck supple. No rigidity.      Comments:  Chronic contractures of extremities.   Skin:     General: Skin is warm and dry.   Neurological:      General: No focal deficit present.      Mental Status: He is alert.   Psychiatric:         Mood and Affect: Mood normal.          Medical Decision Making & ED Course   Medical Decision Makin y.o. male presenting for bright red blood after an enema.  He is afebrile hemodynamic stable.  Awake and alert, nontoxic in appearance.  His abdomen is soft and nontender.  No peritoneal signs.  Low suspicion for perforation.  His caregiver from the group home is here at bedside.  He has a photo of the bleeding.  He has 2 small streaks of bright red blood in the midst of soft, light brown stools.  No melena.  Rectal exam was performed here with consent from the caregiver and chaperone present.  No external lesions.  Soft brown stools on digital rectal exam.  No active bleeding.  No melena.    CBC shows a stable chronic leukopenia 4.0.  Hemoglobin 14.7 compared to 14.9 previously.  Chemistry panel shows chronically elevated stable LFTs.  Will repeat an H&H and continue observe the patient for a few hours.    Patient signed out to Dr. Thomas pending repeat H&H.     Social Determinants of Health which Significantly Impact Care: None identified     EKG Independent Interpretation: EKG not obtained    Independent Result Review and Interpretation: Relevant laboratory and radiographic results were reviewed and independently interpreted by myself.  As necessary, they are commented on in the ED Course.    Chronic conditions affecting the patient's care: As documented above in [facility]    The patient was discussed with the following consultants/services: None    Care Considerations: As documented above in [facility]    ED Course:  ED Course as of 25 1737   e 2025   015 Repeat H&H shows stable hemoglobin and hematocrit.  Patient had no further bloody bowel movements.  Patient otherwise stable for discharge.  Anticipatory guidance  and return precautions provided.  Patient to follow-up with outpatient PCP. [MI]      ED Course User Index  [MI] Lida Thomas MD         Diagnoses as of 06/03/25 1737   Rectal bleeding     Disposition   Patient was signed out to Dr. Thomas at 2100 pending completion of their work-up.  Please see the next provider's transition of care note for the remainder of the patient's care.     Procedures   Procedures    Patient seen and discussed with ED attending physician.    Rakesh Ryan DO  Emergency Medicine     Tommy Ngyuễn DO  Resident  06/02/25 2131        The patient was seen by the resident/fellow.  I have personally performed a substantive portion of the encounter.  I have seen and examined the patient; agree with the workup, evaluation, MDM, management and diagnosis.  The care plan has been discussed with the resident; I have reviewed the resident’s note and agree with the documented findings.      59-year-old male with history of MRDD nonverbal at baseline presenting from [facility] home for concern for rectal bleeding.  Patient had been given an enema and then had a bowel movement with a small amount of red blood mixed in.  On arrival he is afebrile and hemodynamically stable.  Does not appear to be in any acute distress.  Abdomen is soft and nontender.  Rectal exam performed by resident does not reveal any gross blood in the rectal vault.  Initial labs performed, hemoglobin is normal.  Will plan to observe the patient to ensure no continued bleeding and obtain 3-hour H&H.  If stable, I feel patient is likely appropriate for discharge outpatient management.    Patient signed out to oncoming ED provider, Dr. Calderón, pending repeat H&H reevaluation and ultimate disposition.                                                                    Rakesh Ryan DO  06/03/25 4560

## 2025-06-09 ENCOUNTER — SPECIALTY PHARMACY (OUTPATIENT)
Dept: PHARMACY | Facility: CLINIC | Age: 60
End: 2025-06-09

## 2025-06-09 PROCEDURE — RXMED WILLOW AMBULATORY MEDICATION CHARGE

## 2025-06-10 ENCOUNTER — PHARMACY VISIT (OUTPATIENT)
Dept: PHARMACY | Facility: CLINIC | Age: 60
End: 2025-06-10
Payer: COMMERCIAL

## 2025-07-07 ENCOUNTER — SPECIALTY PHARMACY (OUTPATIENT)
Dept: PHARMACY | Facility: CLINIC | Age: 60
End: 2025-07-07

## 2025-07-07 PROCEDURE — RXMED WILLOW AMBULATORY MEDICATION CHARGE

## 2025-07-08 ENCOUNTER — PHARMACY VISIT (OUTPATIENT)
Dept: PHARMACY | Facility: CLINIC | Age: 60
End: 2025-07-08
Payer: COMMERCIAL

## 2025-08-02 PROCEDURE — RXMED WILLOW AMBULATORY MEDICATION CHARGE

## 2025-08-05 ENCOUNTER — SPECIALTY PHARMACY (OUTPATIENT)
Dept: PHARMACY | Facility: CLINIC | Age: 60
End: 2025-08-05

## 2025-08-06 ENCOUNTER — PHARMACY VISIT (OUTPATIENT)
Dept: PHARMACY | Facility: CLINIC | Age: 60
End: 2025-08-06
Payer: COMMERCIAL

## 2025-08-12 ENCOUNTER — SPECIALTY PHARMACY (OUTPATIENT)
Dept: PHARMACY | Facility: CLINIC | Age: 60
End: 2025-08-12

## 2025-08-12 DIAGNOSIS — L20.89 OTHER ATOPIC DERMATITIS: ICD-10-CM

## 2025-08-13 RX ORDER — DUPILUMAB 300 MG/2ML
300 INJECTION, SOLUTION SUBCUTANEOUS
Qty: 4 ML | Refills: 1 | Status: SHIPPED | OUTPATIENT
Start: 2025-08-13

## 2025-08-14 ENCOUNTER — SPECIALTY PHARMACY (OUTPATIENT)
Dept: PHARMACY | Facility: CLINIC | Age: 60
End: 2025-08-14

## 2025-08-26 ENCOUNTER — APPOINTMENT (OUTPATIENT)
Dept: DERMATOLOGY | Facility: CLINIC | Age: 60
End: 2025-08-26
Payer: MEDICAID

## 2025-09-04 ENCOUNTER — SPECIALTY PHARMACY (OUTPATIENT)
Dept: PHARMACY | Facility: CLINIC | Age: 60
End: 2025-09-04